# Patient Record
Sex: MALE | Race: WHITE | NOT HISPANIC OR LATINO | Employment: UNEMPLOYED | URBAN - METROPOLITAN AREA
[De-identification: names, ages, dates, MRNs, and addresses within clinical notes are randomized per-mention and may not be internally consistent; named-entity substitution may affect disease eponyms.]

---

## 2017-10-09 ENCOUNTER — ALLSCRIPTS OFFICE VISIT (OUTPATIENT)
Dept: OTHER | Facility: OTHER | Age: 62
End: 2017-10-09

## 2017-10-09 ENCOUNTER — GENERIC CONVERSION - ENCOUNTER (OUTPATIENT)
Dept: OTHER | Facility: OTHER | Age: 62
End: 2017-10-09

## 2017-10-09 DIAGNOSIS — R55 SYNCOPE AND COLLAPSE: ICD-10-CM

## 2017-10-09 LAB — HBA1C MFR BLD HPLC: NORMAL %

## 2017-10-12 ENCOUNTER — APPOINTMENT (OUTPATIENT)
Dept: RADIOLOGY | Facility: CLINIC | Age: 62
End: 2017-10-12
Payer: COMMERCIAL

## 2017-10-12 DIAGNOSIS — R55 SYNCOPE AND COLLAPSE: ICD-10-CM

## 2017-10-12 LAB
A/G RATIO (HISTORICAL): 2 (CALC) (ref 1–2.5)
ALBUMIN SERPL BCP-MCNC: 4.3 G/DL (ref 3.6–5.1)
ALP SERPL-CCNC: 85 U/L (ref 40–115)
ALT SERPL W P-5'-P-CCNC: 17 U/L (ref 9–46)
AST SERPL W P-5'-P-CCNC: 13 U/L (ref 10–35)
BILIRUB SERPL-MCNC: 1.1 MG/DL (ref 0.2–1.2)
BUN SERPL-MCNC: 11 MG/DL (ref 7–25)
BUN/CREA RATIO (HISTORICAL): ABNORMAL (CALC) (ref 6–22)
CALCIUM SERPL-MCNC: 9.2 MG/DL (ref 8.6–10.3)
CHLORIDE SERPL-SCNC: 97 MMOL/L (ref 98–110)
CHOLEST SERPL-MCNC: 256 MG/DL
CHOLEST/HDLC SERPL: 6.6 (CALC)
CO2 SERPL-SCNC: 28 MMOL/L (ref 20–31)
CREAT SERPL-MCNC: 0.92 MG/DL (ref 0.7–1.25)
EGFR AFRICAN AMERICAN (HISTORICAL): 104 ML/MIN/1.73M2
EGFR-AMERICAN CALC (HISTORICAL): 89 ML/MIN/1.73M2
GAMMA GLOBULIN (HISTORICAL): 2.2 G/DL (CALC) (ref 1.9–3.7)
GLUCOSE (HISTORICAL): 277 MG/DL (ref 65–99)
HDLC SERPL-MCNC: 39 MG/DL
LDL CHOLESTEROL (HISTORICAL): 167 MG/DL (CALC)
NON-HDL-CHOL (CHOL-HDL) (HISTORICAL): 217 MG/DL (CALC)
POTASSIUM SERPL-SCNC: 4 MMOL/L (ref 3.5–5.3)
SODIUM SERPL-SCNC: 134 MMOL/L (ref 135–146)
TOTAL PROTEIN (HISTORICAL): 6.5 G/DL (ref 6.1–8.1)
TRIGL SERPL-MCNC: 323 MG/DL

## 2017-10-12 PROCEDURE — 72040 X-RAY EXAM NECK SPINE 2-3 VW: CPT

## 2017-10-13 ENCOUNTER — GENERIC CONVERSION - ENCOUNTER (OUTPATIENT)
Dept: OTHER | Facility: OTHER | Age: 62
End: 2017-10-13

## 2017-10-13 ENCOUNTER — LAB CONVERSION - ENCOUNTER (OUTPATIENT)
Dept: OTHER | Facility: OTHER | Age: 62
End: 2017-10-13

## 2017-10-13 LAB
25(OH)D3 SERPL-MCNC: 22 NG/ML (ref 30–100)
BASOPHILS # BLD AUTO: 0.9 %
BASOPHILS # BLD AUTO: 50 CELLS/UL (ref 0–200)
DEPRECATED RDW RBC AUTO: 12.8 % (ref 11–15)
EOSINOPHIL # BLD AUTO: 1.4 %
EOSINOPHIL # BLD AUTO: 78 CELLS/UL (ref 15–500)
HCT VFR BLD AUTO: 43.1 % (ref 38.5–50)
HGB BLD-MCNC: 15 G/DL (ref 13.2–17.1)
LYMPHOCYTES # BLD AUTO: 1686 CELLS/UL (ref 850–3900)
LYMPHOCYTES # BLD AUTO: 30.1 %
MCH RBC QN AUTO: 31.3 PG (ref 27–33)
MCHC RBC AUTO-ENTMCNC: 34.8 G/DL (ref 32–36)
MCV RBC AUTO: 90 FL (ref 80–100)
MONOCYTES # BLD AUTO: 358 CELLS/UL (ref 200–950)
MONOCYTES (HISTORICAL): 6.4 %
NEUTROPHILS # BLD AUTO: 3427 CELLS/UL (ref 1500–7800)
NEUTROPHILS # BLD AUTO: 61.2 %
PLATELET # BLD AUTO: 192 THOUSAND/UL (ref 140–400)
PMV BLD AUTO: 10.1 FL (ref 7.5–12.5)
RBC # BLD AUTO: 4.79 MILLION/UL (ref 4.2–5.8)
WBC # BLD AUTO: 5.6 THOUSAND/UL (ref 3.8–10.8)

## 2017-10-16 ENCOUNTER — GENERIC CONVERSION - ENCOUNTER (OUTPATIENT)
Dept: OTHER | Facility: OTHER | Age: 62
End: 2017-10-16

## 2017-10-17 ENCOUNTER — GENERIC CONVERSION - ENCOUNTER (OUTPATIENT)
Dept: OTHER | Facility: OTHER | Age: 62
End: 2017-10-17

## 2017-10-27 ENCOUNTER — HOSPITAL ENCOUNTER (OUTPATIENT)
Dept: NON INVASIVE DIAGNOSTICS | Facility: CLINIC | Age: 62
Discharge: HOME | End: 2017-10-27
Payer: COMMERCIAL

## 2017-10-27 DIAGNOSIS — R55 SYNCOPE AND COLLAPSE: ICD-10-CM

## 2017-10-27 PROCEDURE — 93880 EXTRACRANIAL BILAT STUDY: CPT

## 2017-10-28 ENCOUNTER — GENERIC CONVERSION - ENCOUNTER (OUTPATIENT)
Dept: OTHER | Facility: OTHER | Age: 62
End: 2017-10-28

## 2017-11-06 ENCOUNTER — GENERIC CONVERSION - ENCOUNTER (OUTPATIENT)
Dept: OTHER | Facility: OTHER | Age: 62
End: 2017-11-06

## 2017-11-28 ENCOUNTER — GENERIC CONVERSION - ENCOUNTER (OUTPATIENT)
Dept: OTHER | Facility: OTHER | Age: 62
End: 2017-11-28

## 2017-12-08 ENCOUNTER — ALLSCRIPTS OFFICE VISIT (OUTPATIENT)
Dept: OTHER | Facility: OTHER | Age: 62
End: 2017-12-08

## 2018-01-01 DIAGNOSIS — E55.9 VITAMIN D DEFICIENCY: ICD-10-CM

## 2018-01-09 NOTE — MISCELLANEOUS
Reason For Visit  Reason For Visit Free Text Note Form: Received request to contact pt  regarding insurance related questions  Call to pt  and left message  Will continue to be available to provide support  Active Problems    1  Bilateral edema of lower extremity (782 3) (R60 0)   2  Candidal intertrigo (112 3) (B37 2)   3  Diabetes mellitus, type 2 (250 00) (E11 9)   4  Facial rash (782 1) (R21)   5  Fatigue (780 79) (R53 83)   6  Hyperlipidemia (272 4) (E78 5)   7  Microalbuminuria (791 0) (R80 9)   8  Noncompliance with treatment (V15 81) (Z91 19)   9  Numbness and tingling (782 0) (R20 0,R20 2)   10  Preventive measure (V07 9) (Z41 8)   11  Screen for colon cancer (V76 51) (Z12 11)   12  Screening, lipid (V77 91) (Z13 220)   13  Sinus congestion (478 19) (R09 81)   14  Thyroid nodule (241 0) (E04 1)   15  Urine frequency (788 41) (R35 0)   16  Vitamin D deficiency (268 9) (E55 9)    Current Meds   1  Aspirin Low Dose 81 MG Oral Tablet; Take 1 tablet daily; Therapy: 02OLH2338 to (Evaluate:49Vvd2523)  Requested for: 90ASH7581; Last   Rx:20Fud4188 Ordered   2  Atorvastatin Calcium 40 MG Oral Tablet; TAKE 1 TABLET DAILY AS DIRECTED; Therapy: 80VKA9622 to (Evaluate:69Ryd4434)  Requested for: 08Apr2016; Last   Rx:08Apr2016 Ordered   3  Januvia 50 MG Oral Tablet; TAKE 1 TABLET DAILY; Therapy: 39BZH2253 to (Evaluate:12Jun2016)  Requested for: 13Apr2016; Last   Rx:13Apr2016 Ordered   4  Lisinopril 2 5 MG Oral Tablet; TAKE 1 TABLET DAILY AS DIRECTED; Therapy: 07EZL2055 to (Evaluate:60Epo1828)  Requested for: 08Apr2016; Last   Rx:08Apr2016 Ordered   5  MetFORMIN HCl - 1000 MG Oral Tablet; TAKE 1 TABLET EVERY 12 HOURS DAILY; Therapy: 98BGQ0939 to (Evaluate:61Hgj8201)  Requested for: 18WWH0221; Last   Rx:09Ysk0807 Ordered   6  Saline Nasal Spray 0 65 % Nasal Solution; USE 2 SPRAYS IN EACH NOSTRIL TWICE   DAILY; Therapy: 08Apr2016 to (Last Rx:08Apr2016)  Requested for: 08Apr2016 Ordered   7   Vitamin D (Ergocalciferol) 56408 UNIT Oral Capsule; TAKE 1 CAPSULE WEEKLY; Therapy: 89BYH2822 to (Evaluate:48Bkd9168)  Requested for: 36FVH8619; Last   Rx:23Oct2015 Ordered    Allergies    1   Tylenol    Signatures   Electronically signed by : BRIAN Dang; May 17 2016 12:24PM EST                       (Author)

## 2018-01-12 VITALS
DIASTOLIC BLOOD PRESSURE: 72 MMHG | BODY MASS INDEX: 24.71 KG/M2 | SYSTOLIC BLOOD PRESSURE: 140 MMHG | WEIGHT: 163 LBS | TEMPERATURE: 98.1 F | HEART RATE: 88 BPM | RESPIRATION RATE: 14 BRPM | HEIGHT: 68 IN

## 2018-01-12 NOTE — PROGRESS NOTES
Assessment    1  Sinus congestion (178 74) (R04 81)    Plan  Sinus congestion    · Saline Nasal Spray 0 65 % Nasal Solution; USE 2 SPRAYS IN EACH NOSTRIL  TWICE DAILY    Discussion/Summary    1  sinus congestion  -likely viral in etiology, afebrile without exudates, clear lung exam  -prescribed nasal saline spray; supportive care  -discussed the potential harm in taking non-prescribed medications from family members; also discussed that when a patient, ie nephew, is given an antibiotic he/she should complete the final course even if symptoms improve  -RTC precautions given: new fevers, symptoms not improving over the next week  The patient was counseled regarding instructions for management, risk factor reductions, prognosis, patient and family education, impressions, risks and benefits of treatment options, importance of compliance with treatment  Chief Complaint  URI/sinus symptoms      History of Present Illness  HPI: Patient is a 63 yo M presenting with sinus congestion, rhinorrhea, cough, and sneezing  Symptoms started about a week ago; his sinus congestion was diffuse at first but is now more concentrated on his left side  He thought he saw a white spot on the back of throat 2 days prior  No fevers, but headache  Relieved by aspirin  Sick contacts include 26yo nephew, who had an ear infection  His nephew had left over Amoxicillin which the patient took for about 2-3 days  Patient also complaining of nosebleeds from excessive sneezing and nose-blowing  Of note he is currently living in a basement with his family, which has a lot of dust       Review of Systems    Constitutional: no fever and no chills  ENT: nosebleeds, sore throat and nasal discharge, but no earache and no hearing loss  Cardiovascular: no chest pain and no palpitations  Respiratory: cough, but no shortness of breath and no wheezing  Gastrointestinal: no nausea and no diarrhea  Genitourinary: no dysuria     Integumentary: no rashes and no skin lesions  Neurological: headache, but no dizziness  ROS reviewed  Active Problems    1  Bilateral edema of lower extremity (782 3) (R60 0)   2  Candidal intertrigo (112 3) (B37 2)   3  Diabetes mellitus, type 2 (250 00) (E11 9)   4  Facial rash (782 1) (R21)   5  Fatigue (780 79) (R53 83)   6  Hyperlipidemia (272 4) (E78 5)   7  Microalbuminuria (791 0) (R80 9)   8  Noncompliance with treatment (V15 81) (Z91 19)   9  Numbness and tingling (782 0) (R20 0,R20 2)   10  Preventive measure (V07 9) (Z41 8)   11  Screen for colon cancer (V76 51) (Z12 11)   12  Screening, lipid (V77 91) (Z13 220)   13  Thyroid nodule (241 0) (E04 1)   14  Urine frequency (788 41) (R35 0)   15  Vitamin D deficiency (268 9) (E55 9)    Past Medical History  Active Problems And Past Medical History Reviewed: The active problems and past medical history were reviewed and updated today  Family History    1  Family history of cerebrovascular accident (CVA) (V17 1) (Z82 3)  Family History Reviewed: The family history was reviewed and updated today  Social History    · Non-smoker (V49 89) (Z78 9)  The social history was reviewed and updated today  Surgical History  Surgical History Reviewed: The surgical history was reviewed and updated today  Current Meds   1  Aspirin 81 MG Oral Tablet; TAKE 1 TABLET DAILY; Therapy: 51RQC9572 to (Evaluate:55Wmj2480)  Requested for: 36ZMK0709; Last   Rx:16Oct2015 Ordered   2  Atorvastatin Calcium 40 MG Oral Tablet; TAKE 1 TABLET DAILY AS DIRECTED; Therapy: 82VMO8837 to (Evaluate:75Kxp7916)  Requested for: 80Oiu7248; Last   Rx:12Bvb0227 Ordered   3  Januvia 50 MG Oral Tablet; TAKE 1 TABLET DAILY; Therapy: 38WSI2860 to (Evaluate:87Qws0752)  Requested for: 38ZVF5824; Last   Rx:12Kcs2782 Ordered   4  Lisinopril 2 5 MG Oral Tablet; TAKE 1 TABLET DAILY AS DIRECTED;    Therapy: 06FZD5806 to (Evaluate:56Lgb5626)  Requested for: 08Apr2016; Last   Rx:08Apr2016 Ordered   5  MetFORMIN HCl - 1000 MG Oral Tablet; TAKE 1 TABLET EVERY 12 HOURS DAILY; Therapy: 50NJN2149 to (Evaluate:21Jan2016)  Requested for: 82VHM5465; Last   Rx:23Oct2015 Ordered   6  Vitamin D (Ergocalciferol) 20281 UNIT Oral Capsule; TAKE 1 CAPSULE WEEKLY; Therapy: 65GKG6115 to (Evaluate:34Vax5372)  Requested for: 96HWA0192; Last   Rx:23Oct2015 Ordered    The medication list was reviewed and updated today  Allergies    1  Tylenol    Vitals   Recorded: 08Apr2016 03:56PM   Temperature 98 4 F   Heart Rate 92   Respiration 18   Systolic 692   Diastolic 80   Height 5 ft 8 in   Weight 166 lb 2 08 oz   BMI Calculated 25 26   BSA Calculated 1 89   Pain Scale 6     Physical Exam    Constitutional   General appearance: No acute distress, well appearing and well nourished  Eyes   Conjunctiva and lids: No swelling, erythema, or discharge  Ears, Nose, Mouth, and Throat   External inspection of ears and nose: Normal     Otoscopic examination: Tympanic membrance translucent with normal light reflex  Canals patent without erythema  Oropharynx: Abnormal   post-nasal erythema, no exudates  Pulmonary   Respiratory effort: No increased work of breathing or signs of respiratory distress  Auscultation of lungs: Clear to auscultation, equal breath sounds bilaterally, no wheezes, no rales, no rhonci  Cardiovascular   Auscultation of heart: Normal rate and rhythm, normal S1 and S2, without murmurs  Abdomen   Abdomen: Non-tender, no masses  Lymphatic   Palpation of lymph nodes in neck: No lymphadenopathy  Skin   Skin and subcutaneous tissue: Normal without rashes or lesions  Psychiatric   Orientation to person, place and time: Normal     Mood and affect: Normal          Attending Note  Attending Note [facility]: Attending Note: I discussed the case with the Resident and reviewed the Resident's note, I supervised the Resident and I agree with the Resident management plan as it was presented to me  Level of Participation: I was present in clinic, but did not examine the patient  I agree with the Resident's note  Future Appointments    Date/Time Provider Specialty Site   05/09/2016 05:00 PM Ada Lemon [address]     Signatures   Electronically signed by : Guillermina Prakash DO;  Apr 9 2016 10:15AM EST                       (Author)    Electronically signed by : QUYNH Aguilar ; Apr 15 2016 10:04AM EST                       (Author)

## 2018-01-13 NOTE — RESULT NOTES
Verified Results  (1) CBC/PLT/DIFF 12Oct2017 09:42AM Tierra Ramirez   REPORT COMMENT:  FASTING:YES     Test Name Result Flag Reference   WHITE BLOOD CELL COUNT 5 6 Thousand/uL  3 8-10 8   RED BLOOD CELL COUNT 4 79 Million/uL  4 20-5 80   HEMOGLOBIN 15 0 g/dL  13 2-17 1   HEMATOCRIT 43 1 %  38 5-50 0   MCV 90 0 fL  80 0-100 0   MCH 31 3 pg  27 0-33 0   MCHC 34 8 g/dL  32 0-36 0   RDW 12 8 %  11 0-15 0   PLATELET COUNT 102 Thousand/uL  140-400   ABSOLUTE NEUTROPHILS 3427 cells/uL  2513-4900   ABSOLUTE LYMPHOCYTES 1686 cells/uL  850-3900   ABSOLUTE MONOCYTES 358 cells/uL  200-950   ABSOLUTE EOSINOPHILS 78 cells/uL     ABSOLUTE BASOPHILS 50 cells/uL  0-200   NEUTROPHILS 61 2 %     LYMPHOCYTES 30 1 %     MONOCYTES 6 4 %     EOSINOPHILS 1 4 %     BASOPHILS 0 9 %     MPV 10 1 fL  7 5-12 5     (1) COMPREHENSIVE METABOLIC PANEL 50JMF9872 25:33EB Tierra Ramirez   REPORT COMMENT:  FASTING:YES     Test Name Result Flag Reference   GLUCOSE 277 mg/dL H 65-99   Fasting reference interval     For someone without known diabetes, a glucose  value >125 mg/dL indicates that they may have  diabetes and this should be confirmed with a  follow-up test    UREA NITROGEN (BUN) 11 mg/dL  7-25   CREATININE 0 92 mg/dL  0 70-1 25   For patients >52years of age, the reference limit  for Creatinine is approximately 13% higher for people  identified as -American  eGFR NON-AFR   AMERICAN 89 mL/min/1 73m2  > OR = 60   eGFR AFRICAN AMERICAN 104 mL/min/1 73m2  > OR = 60   BUN/CREATININE RATIO   4-84   NOT APPLICABLE (calc)   SODIUM 134 mmol/L L 135-146   POTASSIUM 4 0 mmol/L  3 5-5 3   CHLORIDE 97 mmol/L L    CARBON DIOXIDE 28 mmol/L  20-31   CALCIUM 9 2 mg/dL  8 6-10 3   PROTEIN, TOTAL 6 5 g/dL  6 1-8 1   ALBUMIN 4 3 g/dL  3 6-5 1   GLOBULIN 2 2 g/dL (calc)  1 9-3 7   ALBUMIN/GLOBULIN RATIO 2 0 (calc)  1 0-2 5   BILIRUBIN, TOTAL 1 1 mg/dL  0 2-1 2   ALKALINE PHOSPHATASE 85 U/L     AST 13 U/L  10-35   ALT 17 U/L  9-46 (1) LIPID PANEL, FASTING 12Oct2017 09:42AM Tierra Ramirez     Test Name Result Flag Reference   CHOLESTEROL, TOTAL 256 mg/dL H <200   HDL CHOLESTEROL 39 mg/dL L >62   TRIGLICERIDES 201 mg/dL H <150   LDL-CHOLESTEROL 167 mg/dL (calc) H    Reference range: <100     Desirable range <100 mg/dL for patients with CHD or  diabetes and <70 mg/dL for diabetic patients with  known heart disease  LDL-C is now calculated using the Micheal-Harmon   calculation, which is a validated novel method providing   better accuracy than the Friedewald equation in the   estimation of LDL-C  Ria Rubi al  [facility]  9688;738(59): 3605-4003   (http://education  [employer]  com/faq/ACW339)   CHOL/HDLC RATIO 6 6 (calc) H <5 0   NON HDL CHOLESTEROL 217 mg/dL (calc) H <130   For patients with diabetes plus 1 major ASCVD risk   factor, treating to a non-HDL-C goal of <100 mg/dL   (LDL-C of <70 mg/dL) is considered a therapeutic   option

## 2018-01-14 NOTE — RESULT NOTES
Verified Results  Hemoglobin A1c- POC 39BUE4501 04:19PM Nasim Willett     Test Name Result Flag Reference   HEMOGLOBIN A1C 10 7%

## 2018-01-15 NOTE — PROGRESS NOTES
Assessment    1  Diabetes mellitus, type 2 (250 00) (E11 9)   2  Facial rash (782 1) (R21)   3  Noncompliance with treatment (V15 81) (Z91 19)   4  Hyperlipidemia (272 4) (E78 5)   5  Microalbuminuria (791 0) (R80 9)    Plan  Diabetes mellitus, type 2    · (1) BASIC METABOLIC PROFILE; Status:Active; Requested for:11Hmc3900;    · (1) HEMOGLOBIN A1C; Status:Active; Requested for:37Dou7968;   Facial rash    · (1) HUBER SCREEN, (INC  PATTERN IF INDICATED); Status:Active; Requested  for:83Svb7651;     Discussion/Summary    63 yo M   1  DM - pt is noncompliant  Not taking his metformin and januvia regularly  Had extensive conversation about medical compliance  Also discussed proper diabetic diet  Will repeat A1c and BMP now  Pt is to schedule ophthalmology apt  Foot exam was up to date on last visit  Pt refuses pneumonia vaccination  C/w ASA 81 daily  F/u 3 months  2  Facial Rash - unclear etiology at this time  Persistent for past 3 months  Will check HUBER to r/o SLE  Otherwise will monitor as pt refuses any creams or meds to be taken  3  Dyslipidemia - previous ASCVD risk score 17%  Pt is not regularly taking his statin as prescribed  Discussed on proper low cholesterol diet and medicine compliance with his statin  4  Pt is noncompliant with most all treatment plans  Takes all his meds very irregularly, often skipping doses  Talked extensively on the importance of medical compliance  Pt is to obtain colonoscopy and thyroid u/s as previously ordered  Reprinted lab slips  5  F/u 3 months  Possible side effects of new medications were reviewed with the patient/guardian today  The treatment plan was reviewed with the patient/guardian   The patient/guardian understands and agrees with the treatment plan   The patient was counseled regarding diagnostic results, instructions for management, risk factor reductions, prognosis, patient and family education, impressions, risks and benefits of treatment options, importance of compliance with treatment  Chief Complaint  3 month f/u DM      History of Present Illness  63 yo M presents to [facility] as a 3 month f/u  Pt has DM and was prescribed januvia 50 daily and metformin 1000 twice daily  However, pt is very noncompliant  He is only taking metformin 500 once a day, and taking junuvia very irregularly  He feels that meds are "not the way to go " However, he has adjusted his diet, and eating healthier  He has cut down on his quantity and cut down half of the junk food  Pt is not really exercising  Pt is also noncompliant on is lisinopril and statin medication - taking it very irregularly  Pt did not go for his ophthalmology apt  Did not go for colonoscopy  Did not go for thyroid u/s  Pt denies fever, chills, cp, sob, palpitations, n/v/d/c, numbness, tingling  Denies abdominal pain  Denies urinary problems  Review of Systems    Constitutional: no fever, not feeling poorly, no chills and not feeling tired  Eyes: no eye pain, no eyesight problems and no purulent discharge from the eyes  ENT: no earache, no nosebleeds and no nasal discharge  Cardiovascular: the heart rate was not slow, no chest pain, no intermittent leg claudication, the heart rate was not fast and no palpitations  Respiratory: no shortness of breath, no cough, no wheezing and no shortness of breath during exertion  Gastrointestinal: no abdominal pain, no nausea, no vomiting, no constipation, no diarrhea and no blood in stools  Genitourinary: no dysuria, no urinary hesitancy, no genital lesions, no incontinence, no nocturia and no testicular pain  Musculoskeletal: no arthralgias, no joint swelling, no myalgias and no joint stiffness  Integumentary: no rashes and no skin lesions  Neurological: no headache, no numbness, no tingling, no dizziness, no limb weakness and no difficulty walking  Psychiatric: no anxiety  Endocrine: no muscle weakness  Hematologic/Lymphatic: no swollen glands  Active Problems    1  Bilateral edema of lower extremity (782 3) (R60 0)   2  Candidal intertrigo (112 3) (B37 2)   3  Diabetes mellitus, type 2 (250 00) (E11 9)   4  Fatigue (780 79) (R53 83)   5  Hyperlipidemia (272 4) (E78 5)   6  Microalbuminuria (791 0) (R80 9)   7  Numbness and tingling (782 0) (R20 2)   8  Preventive measure (V07 9) (Z41 8)   9  Screen for colon cancer (V76 51) (Z12 11)   10  Screening, lipid (V77 91) (Z13 220)   11  Thyroid nodule (241 0) (E04 1)   12  Urine frequency (788 41) (R35 0)   13  Vitamin D deficiency (268 9) (E55 9)    Past Medical History    The active problems and past medical history were reviewed and updated today  Surgical History    The surgical history was reviewed and updated today  Family History    1  Family history of cerebrovascular accident (CVA) (V17 1) (Z82 3)    The family history was reviewed and updated today  Social History    · Non-smoker (V49 89) (Z78 9)  The social history was reviewed and updated today  Current Meds   1  Aspirin 81 MG Oral Tablet; TAKE 1 TABLET DAILY; Therapy: 08KWK5215 to (Evaluate:31Xan5928)  Requested for: 18MAY1885; Last   Rx:16Oct2015 Ordered   2  Atorvastatin Calcium 40 MG Oral Tablet; TAKE 1 TABLET DAILY AS DIRECTED; Therapy: 97NYA8964 to (Evaluate:21Jan2016)  Requested for: 89GJY1719; Last   Rx:23Oct2015 Ordered   3  Januvia 50 MG Oral Tablet; TAKE 1 TABLET DAILY; Therapy: 55FWN6560 to (Evaluate:97Ehf9764)  Requested for: 46BFQ3536; Last   Rx:23Oct2015 Ordered   4  Lisinopril 2 5 MG Oral Tablet; TAKE 1 TABLET DAILY AS DIRECTED; Therapy: 71HFS7435 to (Evaluate:21Jan2016)  Requested for: 29EQG3008; Last   Rx:23Oct2015 Ordered   5  MetFORMIN HCl - 1000 MG Oral Tablet; TAKE 1 TABLET EVERY 12 HOURS DAILY; Therapy: 59QJC0176 to (Evaluate:21Jan2016)  Requested for: 06JDW2607; Last   Rx:23Oct2015 Ordered   6  Vitamin D (Ergocalciferol) 41875 UNIT Oral Capsule; TAKE 1 CAPSULE WEEKLY;    Therapy: 30GHA9817 to (Evaluate:12Zcp5227)  Requested for: 23Oct2015; Last   Rx:23Oct2015 Ordered    The medication list was reviewed and updated today  Allergies    1  Tylenol    Vitals  Vital Signs [Data Includes: Current Encounter]    Recorded: 04ABA6173 05:21PM   Temperature 97 2 F   Heart Rate 84   Respiration 14   Systolic 581   Diastolic 72   Height 5 ft 8 in   Weight 164 lb 2 08 oz   BMI Calculated 24 96   BSA Calculated 1 89   Pain Scale 0     Physical Exam    Constitutional   General appearance: No acute distress, well appearing and well nourished  Eyes   Conjunctiva and lids: No swelling, erythema, or discharge  Ears, Nose, Mouth, and Throat   External inspection of ears and nose: Normal     Nasal mucosa, septum, and turbinates: Normal without edema or erythema  Oropharynx: Normal with no erythema, edema, exudate or lesions  Pulmonary   Auscultation of lungs: Clear to auscultation, equal breath sounds bilaterally, no wheezes, no rales, no rhonci  Cardiovascular   Auscultation of heart: Normal rate and rhythm, normal S1 and S2, without murmurs  Examination of extremities for edema and/or varicosities: Normal     Carotid pulses: Normal     Abdomen   Abdomen: Non-tender, no masses  Liver and spleen: No hepatomegaly or splenomegaly  Lymphatic   Palpation of lymph nodes in neck: No lymphadenopathy  Musculoskeletal   Gait and station: Normal     Inspection/palpation of joints, bones, and muscles: Normal     Skin   Skin and subcutaneous tissue: Abnormal   Positive facial rash over forehead, b/l cheeks and over nasal bridge  Non tender, non erythematous, mildy blanchable  Neurologic   Cranial nerves: Cranial nerves 2-12 intact  Sensation: No sensory loss      Psychiatric   Orientation to person, place and time: Normal          Attending Note  Attending Note: I discussed the case with the Resident and reviewed the Resident's note, I supervised the Resident and I agree with the Resident management plan as it was presented to me  Level of Participation: I was present in clinic, but did not examine the patient  Diagnosis and Plan: chronic issues - as documented  I agree with the Resident's note        Future Appointments    Date/Time Provider Specialty Site   05/09/2016 05:00 PM Edgar [facility] [address] [facility]     Signatures   Electronically signed by : Lorelei Hewitt, ; Feb 9 2016  8:33AM EST                       (Author)    Electronically signed by : Willene Snellen, DO; Feb 12 2016  4:10PM EST                       (Author)

## 2018-01-16 NOTE — RESULT NOTES
Verified Results  * XR SPINE CERVICAL 2 OR 3 VW INJURY 12Oct2017 10:54AM Micki Arreola   TW Order Number: [id number]     Test Name Result Flag Reference   XR SPINE CERVICAL 2 OR 3 VW (Report)     CERVICAL SPINE     INDICATION: Patient has syncope if he turns his head quickly     COMPARISON: None     VIEWS: AP, lateral and open mouth projections     IMAGES: 3     FINDINGS:     There is minimal grade 1 retrolisthesis C5 on C6  There is narrowing of the C5-C6 disc with prominent bone spur at the anterior vertebral margin at this level  There is probably minimal C6-C7 disc narrowing  The remaining discs appear normal       The prevertebral soft tissues are within normal limits  The lung apices are intact         IMPRESSION:     Chronic degenerative changes C5-C6 disc       Workstation performed: [id number]     Signed by:   Becka Andrade MD   10/16/17

## 2018-01-17 NOTE — RESULT NOTES
Verified Results  VAS CAROTID COMPLETE STUDY 90FEB0604 10:05AM Farhan Rubin Order Number: [id number]    - Patient Instructions: To schedule this appointment, please contact Central Scheduling at [phone]  Test Name Result Flag Reference   VAS CAROTID COMPLETE STUDY (Report)     [facility] REPORT   CLINICAL:   Indications: Syncope [R55]  Patient presents with a two year history of near syncopal episodes  Risk Factors   The patient has history of Diabetes and hyperlipidemia  Clinical   Right Brachial Pressure: 126/76 mm Hg, Left Brachial Pressure: 126/76 mm Hg  FINDINGS:      Right    Impression PSV EDV (cm/s) Direction of Flow Ratio    Dist  ICA         84     21           0 66    Mid  ICA         118     39           0 92    Prox  ICA  1 - 49%   90     29           0 71    Dist CCA         118     25                 Mid CCA         127     21           0 95    Prox CCA         134     20                 Ext Carotid       146     11           1 14    Prox Vert         73     17 Antegrade            Subclavian        208     10                    Left     Impression PSV EDV (cm/s) Direction of Flow Ratio    Dist  ICA         73     32           0 47    Mid  ICA         100     37           0 64    Prox  ICA  1 - 49%   125     25           0 81    Dist CCA         127     29                 Mid CCA         155     35           1 16    Prox CCA         133     27                 Ext Carotid       153     13           0 99    Prox Vert         73     16 Antegrade            Subclavian        202     10                          CONCLUSION:   Impression   RIGHT:   There is <50% stenosis noted in the internal carotid artery  Plaque is   homogenous and smooth  Vertebral artery flow is antegrade  There is no significant subclavian artery   disease  LEFT:   There is <50% stenosis noted in the internal carotid artery  Plaque is   heterogenous and irregular     Vertebral artery flow is antegrade  There is no significant subclavian artery   disease  Internal carotid artery stenosis determination by consensus criteria from:   Chris Young et al  Carotid Artery Stenosis: Gray-Scale and Doppler US Diagnosis   - Society of Radiologists in [address] [place] [address], Radiology 2003;   [phone]        SIGNATURE:   Electronically Signed by: Dario Black MD on 2017-10-27 09:30:56 PM

## 2018-01-22 VITALS
WEIGHT: 164.38 LBS | TEMPERATURE: 97.5 F | RESPIRATION RATE: 16 BRPM | SYSTOLIC BLOOD PRESSURE: 124 MMHG | HEART RATE: 76 BPM | HEIGHT: 67 IN | BODY MASS INDEX: 25.8 KG/M2 | DIASTOLIC BLOOD PRESSURE: 78 MMHG

## 2018-01-22 VITALS
TEMPERATURE: 98.3 F | HEIGHT: 68 IN | WEIGHT: 164.25 LBS | BODY MASS INDEX: 24.89 KG/M2 | HEART RATE: 72 BPM | RESPIRATION RATE: 16 BRPM | DIASTOLIC BLOOD PRESSURE: 82 MMHG | SYSTOLIC BLOOD PRESSURE: 140 MMHG

## 2018-01-23 NOTE — CONSULTS
Chief Complaint  Pre-op Clearance      History of Present Illness  Pre-Op Visit (Brief): The patient is being seen for a preoperative visit  The procedure is a(n) Eyelid lift scheduled for 12/22/17 with Dr Addi Ellington  The indication for surgery is Ptosis  Surgical Risk Assessment:   Prior Anesthesia: He had prior anesthesia and no prior adverse reaction to general anesthesia  Pertinent Past Medical History: diabetes, but no CAD, no CHF, no CVA, no asthma, no COPD, not EMANUEL and no renal disease  Exercise Capacity: able to walk four blocks without symptoms and able to walk two flights of stairs without symptoms  Lifestyle Factors: denies tobacco use, denies illegal drug use and Rare alcohol use  Symptoms: no chest pain, no cough, no edema, no palpitations, no wheezing and Pt previously had episodes of pre-syncope when turning his head quickly  Pt had C-spine XR and carotid dopplers that were both negative for concerning abnormality  Pt denies any recurrent episodes of this in over a month  Review of Systems    Cardiovascular: no chest pain, no palpitations and no extremity edema  Respiratory: no shortness of breath and no cough  Neurological: no headache, no numbness, no tingling, no dizziness and no fainting  Active Problems    1  Diabetes mellitus, type 2 (250 00) (E11 9)   2  Hyperlipidemia (272 4) (E78 5)   3  Microalbuminuria (791 0) (R80 9)   4  Noncompliance with treatment (V15 81) (Z91 19)   5  Ptosis of both eyelids (374 30) (H02 403)   6  Thyroid nodule (241 0) (E04 1)   7   Vitamin D deficiency (268 9) (E55 9)    Past Medical History    · History of Bilateral edema of lower extremity (782 3) (R60 0)   · History of Candidal intertrigo (112 3) (B37 2)   · History of Facial rash (782 1) (R21)   · History of fatigue (V13 89) (Z46 796)   · History of headache (V13 89) (B41 165)   · History of paranasal sinus congestion (V12 69) (Z87 09)   · History of urinary frequency (V13 09) (H58 004)   · History of Near syncope (780 2) (R55)   · History of Numbness and tingling (782 0) (R20 0,R20 2)   · History of Preventive measure (V07 9) (Z29 9)   · History of Screen for colon cancer (V76 51) (Z12 11)   · History of Screening, lipid (V77 91) (Z13 220)    The active problems and past medical history were reviewed and updated today  Surgical History    The surgical history was reviewed and updated today  Family History    · Family history of cerebrovascular accident (CVA) (V17 1) (Z82 3)    The family history was reviewed and updated today  Social History    · Non-smoker (V49 89) (Z78 9)  The social history was reviewed and is unchanged  Current Meds   1  Atorvastatin Calcium 40 MG Oral Tablet; TAKE 1 TABLET DAILY; Therapy: 95PWO7694 to (Jacob Joseph)  Requested for: 97NET4123; Last   Rx:28Nov2017 Ordered   2  MetFORMIN HCl - 500 MG Oral Tablet; Take 1 tablet once a day for 3-4 days, then 1 tablet   twice daily for 3-4 days, then 2 tablets twice a day; Therapy: 61EGL0944 to ([id number])  Requested for: 55DLU5912; Last   Rx:09Oct2017 Ordered   3  Tylenol Extra Strength 500 MG Oral Tablet; TAKE 1 TABLET Every 6 hours; Therapy: 72BMG6299 to (Evaluate:27Jan2018)  Requested for: 73PCH7054; Last   Rx:28Nov2017 Ordered   4  Vitamin D (Ergocalciferol) 23819 UNIT Oral Capsule; TAKE 1 CAPSULE WEEKLY; Therapy: 89CFG1636 to (Evaluate:41Xgi0649)  Requested for: 86MCM0163; Last   Rx:13Oct2017 Ordered    The medication list was reviewed and updated today  Allergies    1  Tylenol    Vitals  Signs    Temperature: 97 5 F  Heart Rate: 76  Respiration: 16  Systolic: 137  Diastolic: 78  Height: 5 ft 6 6 in  Weight: 164 lb 6 oz  BMI Calculated: 26 06  BSA Calculated: 1 85    Physical Exam    Constitutional   General appearance: No acute distress, well appearing and well nourished  Head and Face   Head and face: Normal     Eyes   Conjunctiva and lids: No erythema, swelling or discharge  Ears, Nose, Mouth, and Throat   Otoscopic examination: Tympanic membranes translucent with normal light reflex  Canals patent without erythema  Nasal mucosa, septum, and turbinates: Normal without edema or erythema  Oropharynx: Normal with no erythema, edema, exudate or lesions  Neck   Neck: Supple, symmetric, trachea midline, no masses  Thyroid: Normal, no thyromegaly  Pulmonary   Respiratory effort: No increased work of breathing or signs of respiratory distress  Auscultation of lungs: Clear to auscultation  Cardiovascular   Auscultation of heart: Normal rate and rhythm, normal S1 and S2, no murmurs  Examination of extremities for edema and/or varicosities: Normal     Abdomen   Abdomen: Non-tender, no masses  Lymphatic   Palpation of lymph nodes in neck: No lymphadenopathy  Musculoskeletal   Gait and station: Normal     Skin   Skin and subcutaneous tissue: Normal without rashes or lesions  Neurologic Grossly intact  Psychiatric   Orientation to person, place and time: Normal     Mood and affect: Normal        Assessment    1  Preop examination (V72 84) (Z01 818)   2  Ptosis of both eyelids (374 30) (H02 403)    Discussion/Summary  Surgical Clearance: He is at a LOW risk from a cardiovascular standpoint at this time without any additional cardiac testing  Reevaluation needed, if he should present with symptoms prior to surgery/procedure  65year old man who presents for pre-op clearance  (+) DM, but not on insulin  (-) CAD, CHF, CVD, Cr>2    0 4% risk of adverse cardiac event  Pt is low risk for a low risk procedure  The patient was counseled regarding instructions for management  The treatment plan was reviewed with the patient/guardian  The patient/guardian understands and agrees with the treatment plan      End of Encounter Meds    1  MetFORMIN HCl - 500 MG Oral Tablet;  Take 1 tablet once a day for 3-4 days, then 1 tablet   twice daily for 3-4 days, then 2 tablets twice a day; Therapy: 59QTD4687 to ([phone])  Requested for: 64DCG6999; Last   Rx:09Oct2017 Ordered    2  Atorvastatin Calcium 40 MG Oral Tablet; TAKE 1 TABLET DAILY; Therapy: 57JOP8919 to (Gradie Simmonds)  Requested for: 76MGS4905; Last   Rx:28Nov2017 Ordered    3  Tylenol Extra Strength 500 MG Oral Tablet; TAKE 1 TABLET Every 6 hours; Therapy: 94HQS8683 to (Evaluate:27Jan2018)  Requested for: 09NSP7580; Last   Rx:28Nov2017 Ordered    4  Vitamin D (Ergocalciferol) 74248 UNIT Oral Capsule; TAKE 1 CAPSULE WEEKLY;    Therapy: 47TLW5885 to (Evaluate:06Krz4634)  Requested for: 42BLG5706; Last   Rx:13Oct2017 Ordered    Signatures   Electronically signed by : Jarret Blanton DO; Dec 11 2017 10:45AM EST                       (Author)    Electronically signed by : QUYNH Painting ; Dec 14 2017  5:41PM EST                       (Review)

## 2018-06-13 DIAGNOSIS — E78.5 HYPERLIPIDEMIA, UNSPECIFIED HYPERLIPIDEMIA TYPE: Primary | ICD-10-CM

## 2018-06-13 RX ORDER — ATORVASTATIN CALCIUM 40 MG/1
TABLET, FILM COATED ORAL
Qty: 90 TABLET | Refills: 1 | Status: SHIPPED | OUTPATIENT
Start: 2018-06-13 | End: 2019-02-14 | Stop reason: SDUPTHER

## 2019-01-07 DIAGNOSIS — Z79.4 TYPE 2 DIABETES MELLITUS WITHOUT COMPLICATION, WITH LONG-TERM CURRENT USE OF INSULIN (HCC): Primary | ICD-10-CM

## 2019-01-07 DIAGNOSIS — E11.9 TYPE 2 DIABETES MELLITUS WITHOUT COMPLICATION, WITH LONG-TERM CURRENT USE OF INSULIN (HCC): Primary | ICD-10-CM

## 2019-01-07 NOTE — TELEPHONE ENCOUNTER
Called and spoke with patient I was able to get him 01/17 with Dr Cruz  Are we able to refill til then ? Thank you in advance

## 2019-01-07 NOTE — TELEPHONE ENCOUNTER
Patient is need of visit and is requesting metformin  If you can refill and I will send message to clerical to schedule message as soon as possible

## 2019-01-08 NOTE — TELEPHONE ENCOUNTER
One month supply sent   Pt needs to be schedule prior to any further refills as he has not been seen in >1 year

## 2019-01-17 ENCOUNTER — OFFICE VISIT (OUTPATIENT)
Dept: FAMILY MEDICINE CLINIC | Facility: CLINIC | Age: 64
End: 2019-01-17

## 2019-01-17 VITALS
WEIGHT: 158.2 LBS | SYSTOLIC BLOOD PRESSURE: 128 MMHG | DIASTOLIC BLOOD PRESSURE: 80 MMHG | BODY MASS INDEX: 25.43 KG/M2 | HEIGHT: 66 IN | RESPIRATION RATE: 14 BRPM | TEMPERATURE: 97.2 F | HEART RATE: 88 BPM

## 2019-01-17 DIAGNOSIS — E11.9 TYPE 2 DIABETES MELLITUS WITHOUT COMPLICATION, WITHOUT LONG-TERM CURRENT USE OF INSULIN (HCC): Primary | ICD-10-CM

## 2019-01-17 DIAGNOSIS — Z11.59 NEED FOR HEPATITIS C SCREENING TEST: ICD-10-CM

## 2019-01-17 LAB — SL AMB POCT HEMOGLOBIN AIC: 8.7 (ref ?–6.5)

## 2019-01-17 PROCEDURE — 83036 HEMOGLOBIN GLYCOSYLATED A1C: CPT | Performed by: FAMILY MEDICINE

## 2019-01-17 PROCEDURE — 99213 OFFICE O/P EST LOW 20 MIN: CPT | Performed by: FAMILY MEDICINE

## 2019-01-17 RX ORDER — GLIPIZIDE 5 MG/1
5 TABLET, FILM COATED, EXTENDED RELEASE ORAL DAILY
Qty: 30 TABLET | Refills: 5 | Status: SHIPPED | OUTPATIENT
Start: 2019-01-17 | End: 2019-05-12 | Stop reason: SDUPTHER

## 2019-01-17 NOTE — PROGRESS NOTES
Jack Ames 1955 male MRN: [id number]    Family Medicine Follow-up Visit    ASSESSMENT/PLAN  Problem List Items Addressed This Visit     Diabetes mellitus, type 2 ([address] ) - Primary    Relevant Medications    metFORMIN (GLUCOPHAGE) 1000 MG tablet    glipiZIDE (GLUCOTROL XL) 5 mg 24 hr tablet    Other Relevant Orders    Lipid panel    Comprehensive metabolic panel    Microalbumin / creatinine urine ratio      Other Visit Diagnoses     Need for hepatitis C screening test        Relevant Orders    Hepatitis C antibody        DM: In office A1c 8 7%; Above goal --> will start Glipizide 5 mg daily; discussed possible ADRs, including hypoglycemia; encouraged pt to call if he develops any symptoms of low sugars (palpitations, dizzy/lightheaded, nausea, diaphoresis)   - Foot exam: Pt deferred  - Eye exam: Pt could not remember physician's name; encouraged pt to call office with name so we can request records  - Script given for microalbumin/cr ratio     HLD: Lipid panel + CMP script given     HM:   - Pt deferred Flu and Pneumovax   - Script given for Hep C screening     Continue current medication regimen and follow up with specialists as scheduled  Encouraged to follow up in 3 months  No future appointments  SUBJECTIVE  CC: Follow-up and Medication Refill      HPI:  Jack Ames is a 59 y o  male who presents for follow up of chronic conditions as detailed below  DM: Due for A1c, microalbumin/cr, foot and eye exams (last in October); has been taking 1957-0331 mg of Metformin daily as he ran out of medication   HLD: Due for lipid panel; taking statin without issue     Review of Systems   Eyes: Negative for visual disturbance  Respiratory: Negative for shortness of breath  Cardiovascular: Negative for chest pain, palpitations and leg swelling  Neurological: Negative for dizziness and headaches         Historical Information   The patient history was reviewed and updated as follows:    Past Medical History:   Diagnosis Date    Candidal intertrigo 10/16/2015    Diabetes mellitus ([facility] [address] )      History reviewed  No pertinent surgical history  History reviewed  No pertinent family history  Social History   History   Alcohol Use    0 6 oz/week    1 Cans of beer per week     History   Drug Use No     History   Smoking Status    Never Smoker   Smokeless Tobacco    Never Used       Medications:     Current Outpatient Prescriptions:     atorvastatin (LIPITOR) 40 mg tablet, TAKE 1 TABLET EVERY DAY, Disp: 90 tablet, Rfl: 1    metFORMIN (GLUCOPHAGE) 1000 MG tablet, Take 1 tablet (1,000 mg total) by mouth every 12 (twelve) hours, Disp: 60 tablet, Rfl: 5    glipiZIDE (GLUCOTROL XL) 5 mg 24 hr tablet, Take 1 tablet (5 mg total) by mouth daily, Disp: 30 tablet, Rfl: 5  Allergies   Allergen Reactions    Acetaminophen Nausea Only       OBJECTIVE    Vitals:   Vitals:    01/17/19 1838   BP: 128/80   BP Location: Left arm   Patient Position: Sitting   Cuff Size: Adult   Pulse: 88   Resp: 14   Temp: (!) 97 2 °F (36 2 °C)   TempSrc: Tympanic   Weight: 71 8 kg (158 lb 3 2 oz)   Height: 5' 6" (1 676 m)           Physical Exam   Constitutional: He is oriented to person, place, and time  He appears well-developed and well-nourished  No distress  HENT:   Head: Normocephalic and atraumatic  Cardiovascular: Normal rate, regular rhythm and normal heart sounds  Pulmonary/Chest: Effort normal and breath sounds normal  No respiratory distress  Abdominal: Soft  Bowel sounds are normal  He exhibits no distension  There is no tenderness  Musculoskeletal: He exhibits no edema  Neurological: He is alert and oriented to person, place, and time  Skin: Skin is warm and dry  Psychiatric: He has a normal mood and affect                    Luis Delay, DO, PGY-3  [facility]   1/17/2019

## 2019-02-14 DIAGNOSIS — E78.5 HYPERLIPIDEMIA, UNSPECIFIED HYPERLIPIDEMIA TYPE: ICD-10-CM

## 2019-02-14 RX ORDER — ATORVASTATIN CALCIUM 40 MG/1
TABLET, FILM COATED ORAL
Qty: 90 TABLET | Refills: 1 | Status: SHIPPED | OUTPATIENT
Start: 2019-02-14 | End: 2019-08-08 | Stop reason: SDUPTHER

## 2019-03-01 ENCOUNTER — TRANSCRIBE ORDERS (OUTPATIENT)
Dept: LAB | Facility: CLINIC | Age: 64
End: 2019-03-01

## 2019-03-02 ENCOUNTER — APPOINTMENT (OUTPATIENT)
Dept: LAB | Facility: CLINIC | Age: 64
End: 2019-03-02
Payer: COMMERCIAL

## 2019-03-02 DIAGNOSIS — Z11.59 NEED FOR HEPATITIS C SCREENING TEST: ICD-10-CM

## 2019-03-02 DIAGNOSIS — E11.9 TYPE 2 DIABETES MELLITUS WITHOUT COMPLICATION, WITHOUT LONG-TERM CURRENT USE OF INSULIN (HCC): ICD-10-CM

## 2019-03-02 LAB
ALBUMIN SERPL BCP-MCNC: 4 G/DL (ref 3.5–5)
ALP SERPL-CCNC: 80 U/L (ref 46–116)
ALT SERPL W P-5'-P-CCNC: 38 U/L (ref 12–78)
ANION GAP SERPL CALCULATED.3IONS-SCNC: 11 MMOL/L (ref 4–13)
AST SERPL W P-5'-P-CCNC: 21 U/L (ref 5–45)
BILIRUB SERPL-MCNC: 1.4 MG/DL (ref 0.2–1)
BUN SERPL-MCNC: 17 MG/DL (ref 5–25)
CALCIUM SERPL-MCNC: 9.1 MG/DL (ref 8.3–10.1)
CHLORIDE SERPL-SCNC: 100 MMOL/L (ref 100–108)
CHOLEST SERPL-MCNC: 133 MG/DL (ref 50–200)
CO2 SERPL-SCNC: 28 MMOL/L (ref 21–32)
CREAT SERPL-MCNC: 0.97 MG/DL (ref 0.6–1.3)
CREAT UR-MCNC: 148 MG/DL
GFR SERPL CREATININE-BSD FRML MDRD: 83 ML/MIN/1.73SQ M
GLUCOSE P FAST SERPL-MCNC: 190 MG/DL (ref 65–99)
HDLC SERPL-MCNC: 51 MG/DL (ref 40–60)
LDLC SERPL CALC-MCNC: 45 MG/DL (ref 0–100)
MICROALBUMIN UR-MCNC: 53.1 MG/L (ref 0–20)
MICROALBUMIN/CREAT 24H UR: 36 MG/G CREATININE (ref 0–30)
NONHDLC SERPL-MCNC: 82 MG/DL
POTASSIUM SERPL-SCNC: 3.9 MMOL/L (ref 3.5–5.3)
PROT SERPL-MCNC: 7.3 G/DL (ref 6.4–8.2)
SODIUM SERPL-SCNC: 139 MMOL/L (ref 136–145)
TRIGL SERPL-MCNC: 185 MG/DL

## 2019-03-02 PROCEDURE — 80053 COMPREHEN METABOLIC PANEL: CPT

## 2019-03-02 PROCEDURE — 80061 LIPID PANEL: CPT

## 2019-03-02 PROCEDURE — 3060F POS MICROALBUMINURIA REV: CPT | Performed by: FAMILY MEDICINE

## 2019-03-02 PROCEDURE — 82570 ASSAY OF URINE CREATININE: CPT | Performed by: FAMILY MEDICINE

## 2019-03-02 PROCEDURE — 82043 UR ALBUMIN QUANTITATIVE: CPT | Performed by: FAMILY MEDICINE

## 2019-03-02 PROCEDURE — 36415 COLL VENOUS BLD VENIPUNCTURE: CPT

## 2019-03-02 PROCEDURE — 86803 HEPATITIS C AB TEST: CPT

## 2019-03-03 LAB — HCV AB SER QL: NORMAL

## 2019-04-25 ENCOUNTER — OFFICE VISIT (OUTPATIENT)
Dept: FAMILY MEDICINE CLINIC | Facility: CLINIC | Age: 64
End: 2019-04-25

## 2019-04-25 VITALS
RESPIRATION RATE: 16 BRPM | DIASTOLIC BLOOD PRESSURE: 70 MMHG | BODY MASS INDEX: 26.23 KG/M2 | HEART RATE: 78 BPM | TEMPERATURE: 98.1 F | SYSTOLIC BLOOD PRESSURE: 138 MMHG | WEIGHT: 163.2 LBS | HEIGHT: 66 IN

## 2019-04-25 DIAGNOSIS — R80.9 TYPE 2 DIABETES MELLITUS WITH MICROALBUMINURIA, WITHOUT LONG-TERM CURRENT USE OF INSULIN (HCC): Primary | ICD-10-CM

## 2019-04-25 DIAGNOSIS — E11.29 TYPE 2 DIABETES MELLITUS WITH MICROALBUMINURIA, WITHOUT LONG-TERM CURRENT USE OF INSULIN (HCC): Primary | ICD-10-CM

## 2019-04-25 DIAGNOSIS — E78.2 MIXED HYPERLIPIDEMIA: ICD-10-CM

## 2019-04-25 LAB — SL AMB POCT HEMOGLOBIN AIC: 6.7 (ref ?–6.5)

## 2019-04-25 PROCEDURE — 99213 OFFICE O/P EST LOW 20 MIN: CPT | Performed by: FAMILY MEDICINE

## 2019-04-25 PROCEDURE — 83036 HEMOGLOBIN GLYCOSYLATED A1C: CPT | Performed by: FAMILY MEDICINE

## 2019-04-25 RX ORDER — AZELASTINE HYDROCHLORIDE 0.5 MG/ML
SOLUTION/ DROPS OPHTHALMIC
Refills: 3 | COMMUNITY
Start: 2019-02-15 | End: 2020-08-12

## 2019-04-25 RX ORDER — LISINOPRIL 5 MG/1
5 TABLET ORAL DAILY
Qty: 30 TABLET | Refills: 1 | Status: SHIPPED | OUTPATIENT
Start: 2019-04-25 | End: 2019-05-18 | Stop reason: SDUPTHER

## 2019-05-12 DIAGNOSIS — E11.9 TYPE 2 DIABETES MELLITUS WITHOUT COMPLICATION, WITHOUT LONG-TERM CURRENT USE OF INSULIN (HCC): ICD-10-CM

## 2019-05-13 RX ORDER — GLIPIZIDE 5 MG/1
TABLET, FILM COATED, EXTENDED RELEASE ORAL
Qty: 30 TABLET | Refills: 5 | Status: SHIPPED | OUTPATIENT
Start: 2019-05-13 | End: 2019-11-09 | Stop reason: SDUPTHER

## 2019-05-18 DIAGNOSIS — R80.9 TYPE 2 DIABETES MELLITUS WITH MICROALBUMINURIA, WITHOUT LONG-TERM CURRENT USE OF INSULIN (HCC): ICD-10-CM

## 2019-05-18 DIAGNOSIS — E11.29 TYPE 2 DIABETES MELLITUS WITH MICROALBUMINURIA, WITHOUT LONG-TERM CURRENT USE OF INSULIN (HCC): ICD-10-CM

## 2019-05-18 RX ORDER — LISINOPRIL 5 MG/1
TABLET ORAL
Qty: 90 TABLET | Refills: 1 | Status: SHIPPED | OUTPATIENT
Start: 2019-05-18 | End: 2019-11-10 | Stop reason: SDUPTHER

## 2019-07-15 ENCOUNTER — OFFICE VISIT (OUTPATIENT)
Dept: FAMILY MEDICINE CLINIC | Facility: CLINIC | Age: 64
End: 2019-07-15

## 2019-07-15 VITALS
HEART RATE: 92 BPM | DIASTOLIC BLOOD PRESSURE: 82 MMHG | SYSTOLIC BLOOD PRESSURE: 128 MMHG | RESPIRATION RATE: 16 BRPM | BODY MASS INDEX: 25.57 KG/M2 | TEMPERATURE: 97.4 F | WEIGHT: 158.4 LBS

## 2019-07-15 DIAGNOSIS — E11.29 TYPE 2 DIABETES MELLITUS WITH MICROALBUMINURIA, WITHOUT LONG-TERM CURRENT USE OF INSULIN (HCC): ICD-10-CM

## 2019-07-15 DIAGNOSIS — G89.29 CHRONIC BACK PAIN GREATER THAN 3 MONTHS DURATION: ICD-10-CM

## 2019-07-15 DIAGNOSIS — R80.9 TYPE 2 DIABETES MELLITUS WITH MICROALBUMINURIA, WITHOUT LONG-TERM CURRENT USE OF INSULIN (HCC): ICD-10-CM

## 2019-07-15 DIAGNOSIS — Z86.39 HISTORY OF THYROID NODULE: ICD-10-CM

## 2019-07-15 DIAGNOSIS — M54.9 CHRONIC BACK PAIN GREATER THAN 3 MONTHS DURATION: ICD-10-CM

## 2019-07-15 DIAGNOSIS — Z87.39 HISTORY OF HERNIATED INTERVERTEBRAL DISC: Primary | ICD-10-CM

## 2019-07-15 DIAGNOSIS — E78.5 HYPERLIPIDEMIA, UNSPECIFIED HYPERLIPIDEMIA TYPE: ICD-10-CM

## 2019-07-15 PROCEDURE — 99213 OFFICE O/P EST LOW 20 MIN: CPT | Performed by: FAMILY MEDICINE

## 2019-07-15 NOTE — PROGRESS NOTES
Assessment/Plan:    Type 2 diabetes mellitus with microalbuminuria, without long-term current use of insulin (HCC)  Lab Results   Component Value Date    HGBA1C 6 7 (A) 2019       No results for input(s): POCGLU in the last 72 hours  Blood Sugar Average: Last 72 hrs:     A1C trended down during last visit  Too soon to recheck at this time  Will order out patient lab for next month  Discussed Pneumovax, patient declined  Noncompliant w/ Metformin takes between 3992-5239 mg daily  Will see if dose can be adjusted after lab work  History of thyroid nodule  Per chart review remote hx of thyroid nodule  Will order TSH and f/u    Hyperlipidemia  Stable, improved compared to prior lab work  Recent lipid panel : T  Continue atorvastatin 40 mg qhs, encouraged lifestyle modifications  History of herniated intervertebral disc  Patient reported, unable to find per chart review   Does not currently do anything for back pain  Resistant to exercises, believes rest is best  Reviewed different exercises, encouraged beginners yoga, receptive to yoga  Provided PT referral, would like to pursue yoga first        Diagnoses and all orders for this visit:    History of herniated intervertebral disc  -     Ambulatory referral to Physical Therapy; Future    Chronic back pain greater than 3 months duration  -     Ambulatory referral to Physical Therapy; Future    History of thyroid nodule  -     TSH, 3rd generation with Free T4 reflex; Future    Type 2 diabetes mellitus with microalbuminuria, without long-term current use of insulin (HCC)  -     HEMOGLOBIN A1C W/ EAG ESTIMATION; Future    Hyperlipidemia, unspecified hyperlipidemia type  -     Lipid panel; Future          Subjective:      Patient ID: Mike Muse is a 59 y o  male is here for f/u regarding  DM, HLD,    Back Pain   This is a chronic problem  The current episode started more than 1 year ago  The problem occurs 2 to 4 times per day   The problem has been gradually worsening since onset  The pain is present in the lumbar spine and sacro-iliac  The quality of the pain is described as aching  The pain does not radiate  The pain is at a severity of 9/10  The pain is worse during the day  The symptoms are aggravated by bending, position and stress  Stiffness is present all day  Associated symptoms include pelvic pain  Pertinent negatives include no abdominal pain, bladder incontinence, bowel incontinence, chest pain, dysuria, fever, headaches, leg pain, numbness, paresis, paresthesias, perianal numbness, tingling, weakness or weight loss  The following portions of the patient's history were reviewed and updated as appropriate: allergies, current medications, past family history, past medical history, past social history, past surgical history and problem list     Tylenol allergy: says liquid specifically, says he felt lightheaded  Recent optho surgery, sounds like blepharoplasty  Review of Systems   Constitutional: Negative for chills, fever and weight loss  HENT: Negative for congestion, nosebleeds and rhinorrhea  Respiratory: Negative for shortness of breath  Cardiovascular: Negative for chest pain  Gastrointestinal: Negative for abdominal pain and bowel incontinence  Genitourinary: Positive for pelvic pain  Negative for bladder incontinence and dysuria  Musculoskeletal: Positive for back pain  Neurological: Negative for tingling, weakness, numbness, headaches and paresthesias           Objective:  Current Outpatient Medications on File Prior to Visit   Medication Sig Dispense Refill    atorvastatin (LIPITOR) 40 mg tablet TAKE 1 TABLET BY MOUTH EVERY DAY 90 tablet 1    azelastine (OPTIVAR) 0 05 % ophthalmic solution INSTILL ONE DROP INTO BOTH EYES TWO TIMES A DAY  3    glipiZIDE (GLUCOTROL XL) 5 mg 24 hr tablet TAKE 1 TABLET BY MOUTH EVERY DAY 30 tablet 5    lisinopril (ZESTRIL) 5 mg tablet TAKE 1 TABLET BY MOUTH EVERY DAY 90 tablet 1    metFORMIN (GLUCOPHAGE) 1000 MG tablet TAKE 1 TABLET (1,000 MG TOTAL) BY MOUTH EVERY 12 (TWELVE) HOURS 60 tablet 5     No current facility-administered medications on file prior to visit  /82 (BP Location: Left arm, Patient Position: Sitting, Cuff Size: Standard)   Pulse 92   Temp (!) 97 4 °F (36 3 °C) (Tympanic)   Resp 16   Wt 71 8 kg (158 lb 6 4 oz)   BMI 25 57 kg/m²          Physical Exam   Constitutional: He appears well-developed and well-nourished  No distress  HENT:   Head: Normocephalic and atraumatic  Eyes: Conjunctivae and EOM are normal    Cardiovascular: Normal rate, regular rhythm and normal heart sounds  Pulmonary/Chest: Effort normal and breath sounds normal  No respiratory distress  He has no wheezes  Abdominal: Soft  There is no tenderness  Musculoskeletal: He exhibits no edema or tenderness  Neurological: He is alert  Skin: Skin is warm and dry  He is not diaphoretic  Psychiatric: He has a normal mood and affect  Vitals reviewed

## 2019-07-15 NOTE — ASSESSMENT & PLAN NOTE
Lab Results   Component Value Date    HGBA1C 6 7 (A) 04/25/2019       No results for input(s): POCGLU in the last 72 hours  Blood Sugar Average: Last 72 hrs:     A1C trended down during last visit  Too soon to recheck at this time  Will order out patient lab for next month  Discussed Pneumovax, patient declined  Noncompliant w/ Metformin takes between 6350-8308 mg daily  Will see if dose can be adjusted after lab work

## 2019-07-16 PROBLEM — Z86.39 HISTORY OF THYROID NODULE: Status: ACTIVE | Noted: 2019-07-16

## 2019-07-16 PROBLEM — Z87.39 HISTORY OF HERNIATED INTERVERTEBRAL DISC: Status: ACTIVE | Noted: 2019-07-16

## 2019-07-16 NOTE — ASSESSMENT & PLAN NOTE
Patient reported, unable to find per chart review   Does not currently do anything for back pain  Resistant to exercises, believes rest is best  Reviewed different exercises, encouraged beginners yoga, receptive to yoga  Provided PT referral, would like to pursue yoga first

## 2019-07-16 NOTE — ASSESSMENT & PLAN NOTE
Stable, improved compared to prior lab work  Recent lipid panel : T  Continue atorvastatin 40 mg qhs, encouraged lifestyle modifications

## 2019-08-08 ENCOUNTER — EVALUATION (OUTPATIENT)
Dept: PHYSICAL THERAPY | Facility: CLINIC | Age: 64
End: 2019-08-08
Payer: COMMERCIAL

## 2019-08-08 DIAGNOSIS — Z87.39 HISTORY OF HERNIATED INTERVERTEBRAL DISC: ICD-10-CM

## 2019-08-08 DIAGNOSIS — E78.5 HYPERLIPIDEMIA, UNSPECIFIED HYPERLIPIDEMIA TYPE: ICD-10-CM

## 2019-08-08 DIAGNOSIS — G89.29 CHRONIC BACK PAIN GREATER THAN 3 MONTHS DURATION: ICD-10-CM

## 2019-08-08 DIAGNOSIS — M54.9 CHRONIC BACK PAIN GREATER THAN 3 MONTHS DURATION: ICD-10-CM

## 2019-08-08 PROCEDURE — 97161 PT EVAL LOW COMPLEX 20 MIN: CPT | Performed by: PHYSICAL THERAPIST

## 2019-08-08 PROCEDURE — 97112 NEUROMUSCULAR REEDUCATION: CPT | Performed by: PHYSICAL THERAPIST

## 2019-08-08 RX ORDER — ATORVASTATIN CALCIUM 40 MG/1
TABLET, FILM COATED ORAL
Qty: 90 TABLET | Refills: 2 | Status: SHIPPED | OUTPATIENT
Start: 2019-08-08 | End: 2020-06-28

## 2019-08-08 NOTE — PROGRESS NOTES
PT Evaluation     Today's date: 2019  Patient name: Murtaza Mak  : 1955  MRN: [id number]  Referring provider: Hailey Brothers MD  Dx:   Encounter Diagnosis     ICD-10-CM    1  History of herniated intervertebral disc Z87 39 Ambulatory referral to Physical Therapy   2  Chronic back pain greater than 3 months duration M54 9 Ambulatory referral to Physical Therapy    G89 29                   Assessment  Assessment details: Pt presents with s/s consistent with L2-5 hypomobility, core instability, and severe postural abnormalities  He will benefit from skilled PT services to address his impairments in order to decrease pain and restore function  Impairments: abnormal coordination, abnormal muscle firing, abnormal or restricted ROM, abnormal movement, activity intolerance, impaired physical strength, lacks appropriate home exercise program, pain with function, poor posture  and poor body mechanics  Understanding of Dx/Px/POC: fair   Prognosis: fair    Goals  STG - 5 visits  1) pt will be I with HEP  2) pt will demonstrate > 50 deg PSLR  3) pt will > 70 deg lumbar FLEX  4) pt will improve postural awareness > 50%  5) pt will improve pain levels > 50%    LTG - 10 visits  1) pt will demonstrate > 60 deg PSLR  2) pt will demonstrate normalized standing and sitting posture  3) pt will demonstrate > 2 min iso bridge  4) pt will improve pain levels > 75%    Plan  Planned therapy interventions: abdominal trunk stabilization, manual therapy, joint mobilization, neuromuscular re-education, patient education, postural training, strengthening, stretching, therapeutic activities, therapeutic exercise, functional ROM exercises, flexibility, home exercise program and body mechanics training  Frequency: 2x week  Duration in visits: 10  Treatment plan discussed with: patient        Subjective Evaluation    History of Present Illness  Mechanism of injury: Pt is a 59 y o  male with PMHx significant for DM II    He reports chronic episodic Hx of L LBP without radicular symptoms  He reports constant pain since recent exacerbation in 2019  Pain  Current pain ratin  At best pain ratin  At worst pain rating: 10  Progression: no change    Social Support    Employment status: working    Diagnostic Tests  No diagnostic tests performed  Treatments  No previous or current treatments  Patient Goals  Patient goals for therapy: decreased pain          Objective     Postural Observations  Seated posture: poor  Standing posture: poor  Correction of posture: has no consistent effect    Additional Postural Observation Details  Pt stands with mild L lateral shift without pain, severe sway back, R scapular and innominate higher than L in standing    Tenderness     Lumbar Spine  No tenderness in the spinous process  Additional Tenderness Details  Mild L4/5 step-off deformity noted    Active Range of Motion     Lumbar   Flexion: 50 degrees   Extension: 20 degrees  with pain  Left lateral flexion:  WFL  Right lateral flexion:  with pain Restriction level: minimal  Left rotation:  Restriction level: minimal  Right rotation:  Restriction level: minimal    Passive Range of Motion   Left Hip   External rotation (prone): 40 degrees   Internal rotation (prone): 20 degrees     Right Hip   External rotation (prone): 30 degrees   Internal rotation (prone): 38 degrees     Joint Play   Joints within functional limits: T12, L1 and S1     Hypomobile: L2, L3, L4 and L5     Muscle Activation   Patient able to activate left transverse abdominals and right transverse abdominals  Patient unable to activate left external obliques, left internal obliques, right external obliques and right internal obliques  Additional Muscle Activation Details  Pt unable to perform abdominal bracing, able to perform weak TA draw    Tests     Lumbar   Negative SIJ compression, sacroiliac distraction and sacral spring   Left   Negative passive SLR (40 deg)  Right   Negative passive SLR (40 deg)  Precautions:  PMHx: DM II  Dx: chronic L LBP, L2-5 hypomobility, core instability, postural abnormalities    Manual  8/8            Gr IV L2-5 PA mobs             Gr IV L lower lumbar opening mobs in SL                                                        Exercise Diary  8/8            iso TA draw 5"x10            DLS jazmyn 1H04 everardo hampton             DLS prone UE/LE extension             Supine HA stretch 20"x2                                                                                                                                                                                                      Modalities

## 2019-08-14 NOTE — ASSESSMENT & PLAN NOTE
- continue atorvastatin 40 mg q h s   - encouraged lifestyle modifications including dietary and increasing physical activity  - did not complete lab work from last visit

## 2019-08-14 NOTE — ASSESSMENT & PLAN NOTE
Lab Results   Component Value Date    HGBA1C 6 7 (A) 04/25/2019       No results for input(s): POCGLU in the last 72 hours  Blood Sugar Average: Last 72 hrs:     - labs ordered at last visit, not completed  - plan was to readjust dose depending on A1c level    Patient currently taking metformin however noncompliant, dose adjusts to "lower" dose on his own  - reports compliance w/ glipizide   - advised to complete labs to evaluate need  - needs refill, will provide short supply at this time

## 2019-08-14 NOTE — ASSESSMENT & PLAN NOTE
- remote hx of thyroid nodule  - did not complete lab work ordered during last visit  - encouraged to complete lab work

## 2019-08-14 NOTE — ASSESSMENT & PLAN NOTE
- patient has been exercising at home  - patient has regular follow-up with PT  - patient reports improvement in LBP

## 2019-08-15 ENCOUNTER — OFFICE VISIT (OUTPATIENT)
Dept: PHYSICAL THERAPY | Facility: CLINIC | Age: 64
End: 2019-08-15
Payer: COMMERCIAL

## 2019-08-15 DIAGNOSIS — M54.9 CHRONIC BACK PAIN GREATER THAN 3 MONTHS DURATION: ICD-10-CM

## 2019-08-15 DIAGNOSIS — Z87.39 HISTORY OF HERNIATED INTERVERTEBRAL DISC: Primary | ICD-10-CM

## 2019-08-15 DIAGNOSIS — G89.29 CHRONIC BACK PAIN GREATER THAN 3 MONTHS DURATION: ICD-10-CM

## 2019-08-15 PROCEDURE — 97112 NEUROMUSCULAR REEDUCATION: CPT | Performed by: PHYSICAL THERAPIST

## 2019-08-15 NOTE — PROGRESS NOTES
Daily Note     Today's date: 8/15/2019  Patient name: Erica Lopes  : 1955  MRN: [id number]  Referring provider: Jordyn Layne MD  Dx:   Encounter Diagnosis     ICD-10-CM    1  History of herniated intervertebral disc Z87 39    2  Chronic back pain greater than 3 months duration M54 9     G89 29                   Subjective: Pt reports mildly improved LBP despite poor compliance with HEP  Objective: See treatment diary below    Assessment: Pt demonstrated pain-free tolerance to core stabilization  Plan: Assess response NV  Precautions:  PMHx: DM II  Dx: chronic L LBP, L2-5 hypomobility, core instability, postural abnormalities    Manual  8/8 8/15           Gr IV L2-5 PA mobs  1x40ea           Gr IV L lower lumbar opening mobs in SL  2x40                                                      Exercise Diary  8/8 8/15           iso TA draw 5"x10 5"x20           DLS marches 2P59 ea 7V20 ea           bridges  2x10           clamshells  YTB  2x15           Supine HA stretch 20"x2 20"x4                                                                                                                                                                                                     Modalities

## 2019-08-16 ENCOUNTER — OFFICE VISIT (OUTPATIENT)
Dept: FAMILY MEDICINE CLINIC | Facility: CLINIC | Age: 64
End: 2019-08-16

## 2019-08-16 VITALS
WEIGHT: 157 LBS | TEMPERATURE: 98.8 F | BODY MASS INDEX: 25.23 KG/M2 | SYSTOLIC BLOOD PRESSURE: 114 MMHG | DIASTOLIC BLOOD PRESSURE: 70 MMHG | HEIGHT: 66 IN | RESPIRATION RATE: 16 BRPM | HEART RATE: 80 BPM

## 2019-08-16 DIAGNOSIS — E11.9 TYPE 2 DIABETES MELLITUS WITHOUT COMPLICATION, WITHOUT LONG-TERM CURRENT USE OF INSULIN (HCC): ICD-10-CM

## 2019-08-16 PROCEDURE — 3008F BODY MASS INDEX DOCD: CPT | Performed by: FAMILY MEDICINE

## 2019-08-16 PROCEDURE — 99213 OFFICE O/P EST LOW 20 MIN: CPT | Performed by: FAMILY MEDICINE

## 2019-08-16 NOTE — PROGRESS NOTES
Assessment/Plan:    Type 2 diabetes mellitus with microalbuminuria, without long-term current use of insulin ([facility])  Lab Results   Component Value Date    HGBA1C 6 7 (A) 04/25/2019       No results for input(s): POCGLU in the last 72 hours  Blood Sugar Average: Last 72 hrs:     - labs ordered at last visit, not completed  - plan was to readjust dose depending on A1c level  Patient currently taking metformin however noncompliant, dose adjusts to "lower" dose on his own  - reports compliance w/ glipizide   - advised to complete labs to evaluate need  - needs refill, will provide short supply at this time    Hyperlipidemia  - continue atorvastatin 40 mg q h s   - encouraged lifestyle modifications including dietary and increasing physical activity  - did not complete lab work from last visit      History of thyroid nodule  - remote hx of thyroid nodule  - did not complete lab work ordered during last visit  - encouraged to complete lab work    History of herniated intervertebral disc  - patient has been exercising at home  - patient has regular follow-up with PT  - patient reports improvement in LBP       Diagnoses and all orders for this visit:    Type 2 diabetes mellitus without complication, without long-term current use of insulin ([address] )  -     metFORMIN (GLUCOPHAGE) 1000 MG tablet; Take 1 tablet (1,000 mg total) by mouth every 12 (twelve) hours          Subjective:      Patient ID: Min Hathaway is a 59 y o  male  Pt presents for f/u     1  DM  2  HLD  3  History of Herinated disc    Reports compliance w/ glipizide, statin and physical therapy  Just started physical therapy last week  Reports some increased soreness S/P physical therapy  Noncompliant with metformin  Dose adjusts metformin to lower doses takes approximately 1500 mg a day        The following portions of the patient's history were reviewed and updated as appropriate: allergies, past medical history and problem list     Review of Systems Constitutional: Negative for activity change, chills, fatigue and fever  HENT: Negative for congestion and rhinorrhea  Eyes: Negative for visual disturbance  Respiratory: Negative for cough and shortness of breath  Cardiovascular: Negative for chest pain and palpitations  Gastrointestinal: Negative for nausea and vomiting  Musculoskeletal: Positive for back pain  Skin: Negative for rash and wound  Objective:      /70   Pulse 80   Temp 98 8 °F (37 1 °C)   Resp 16   Ht 5' 6" (1 676 m)   Wt 71 2 kg (157 lb)   BMI 25 34 kg/m²          Physical Exam   Constitutional: He is oriented to person, place, and time  He appears well-developed and well-nourished  No distress  HENT:   Head: Normocephalic and atraumatic  Eyes: Conjunctivae are normal    Neck: Normal range of motion  Cardiovascular: Normal rate, regular rhythm and normal heart sounds  Pulmonary/Chest: Effort normal and breath sounds normal  No respiratory distress  He has no wheezes  Abdominal: Soft  Neurological: He is alert and oriented to person, place, and time  Skin: Skin is warm and dry  He is not diaphoretic  Psychiatric: He has a normal mood and affect  Vitals reviewed

## 2019-08-19 ENCOUNTER — OFFICE VISIT (OUTPATIENT)
Dept: PHYSICAL THERAPY | Facility: CLINIC | Age: 64
End: 2019-08-19
Payer: COMMERCIAL

## 2019-08-19 ENCOUNTER — APPOINTMENT (OUTPATIENT)
Dept: LAB | Facility: CLINIC | Age: 64
End: 2019-08-19
Payer: COMMERCIAL

## 2019-08-19 DIAGNOSIS — G89.29 CHRONIC BACK PAIN GREATER THAN 3 MONTHS DURATION: ICD-10-CM

## 2019-08-19 DIAGNOSIS — E78.5 HYPERLIPIDEMIA, UNSPECIFIED HYPERLIPIDEMIA TYPE: ICD-10-CM

## 2019-08-19 DIAGNOSIS — M54.9 CHRONIC BACK PAIN GREATER THAN 3 MONTHS DURATION: ICD-10-CM

## 2019-08-19 DIAGNOSIS — Z86.39 HISTORY OF THYROID NODULE: ICD-10-CM

## 2019-08-19 DIAGNOSIS — R80.9 TYPE 2 DIABETES MELLITUS WITH MICROALBUMINURIA, WITHOUT LONG-TERM CURRENT USE OF INSULIN (HCC): ICD-10-CM

## 2019-08-19 DIAGNOSIS — E11.29 TYPE 2 DIABETES MELLITUS WITH MICROALBUMINURIA, WITHOUT LONG-TERM CURRENT USE OF INSULIN (HCC): ICD-10-CM

## 2019-08-19 DIAGNOSIS — Z87.39 HISTORY OF HERNIATED INTERVERTEBRAL DISC: Primary | ICD-10-CM

## 2019-08-19 LAB
CHOLEST SERPL-MCNC: 136 MG/DL (ref 50–200)
EST. AVERAGE GLUCOSE BLD GHB EST-MCNC: 131 MG/DL
HBA1C MFR BLD: 6.2 % (ref 4.2–6.3)
HDLC SERPL-MCNC: 48 MG/DL (ref 40–60)
LDLC SERPL CALC-MCNC: 47 MG/DL (ref 0–100)
NONHDLC SERPL-MCNC: 88 MG/DL
TRIGL SERPL-MCNC: 205 MG/DL
TSH SERPL DL<=0.05 MIU/L-ACNC: 2.56 UIU/ML (ref 0.36–3.74)

## 2019-08-19 PROCEDURE — 84443 ASSAY THYROID STIM HORMONE: CPT

## 2019-08-19 PROCEDURE — 36415 COLL VENOUS BLD VENIPUNCTURE: CPT

## 2019-08-19 PROCEDURE — 83036 HEMOGLOBIN GLYCOSYLATED A1C: CPT

## 2019-08-19 PROCEDURE — 97112 NEUROMUSCULAR REEDUCATION: CPT | Performed by: PHYSICAL THERAPIST

## 2019-08-19 PROCEDURE — 80061 LIPID PANEL: CPT

## 2019-08-19 NOTE — PROGRESS NOTES
Daily Note     Today's date: 2019  Patient name: Karen Hernandez  : 1955  MRN: [id number]  Referring provider: Jo Lerma MD  Dx:   Encounter Diagnosis     ICD-10-CM    1  History of herniated intervertebral disc Z87 39    2  Chronic back pain greater than 3 months duration M54 9     G89 29                   Subjective: Pt reports pain only with leaning forward when going up stairs  Objective: See treatment diary below    Assessment: Pt demonstrated improved core stability  Plan: Cont  POC  Precautions:  PMHx: DM II  Dx: chronic L LBP, L2-5 hypomobility, core instability, postural abnormalities    Manual  8/8 8/15 8/19          Gr IV L2-5 PA mobs  1x40ea 1x40ea          Gr IV L lower lumbar opening mobs in SL  2x40 3x40                                                     Exercise Diary  8/8 8/15 8/19          iso TA draw 5"x10 5"x20 5"x20          DLS marches 4J98 ea 3O60 ea 3x15 ea          bridges  2x10 2x12          clamshells  YTB  2x15 YTB  3x15          Supine HA stretch 20"x2 20"x4 20"x4                                                                                                                                                                                                    Modalities

## 2019-08-21 ENCOUNTER — OFFICE VISIT (OUTPATIENT)
Dept: PHYSICAL THERAPY | Facility: CLINIC | Age: 64
End: 2019-08-21
Payer: COMMERCIAL

## 2019-08-21 DIAGNOSIS — Z87.39 HISTORY OF HERNIATED INTERVERTEBRAL DISC: Primary | ICD-10-CM

## 2019-08-21 DIAGNOSIS — G89.29 CHRONIC BACK PAIN GREATER THAN 3 MONTHS DURATION: ICD-10-CM

## 2019-08-21 DIAGNOSIS — M54.9 CHRONIC BACK PAIN GREATER THAN 3 MONTHS DURATION: ICD-10-CM

## 2019-08-21 PROCEDURE — 97112 NEUROMUSCULAR REEDUCATION: CPT | Performed by: PHYSICAL THERAPIST

## 2019-08-21 NOTE — PROGRESS NOTES
Daily Note     Today's date: 2019  Patient name: Patrica Marquez  : 1955  MRN: [id number]  Referring provider: Rich Agudelo MD  Dx:   Encounter Diagnosis     ICD-10-CM    1  History of herniated intervertebral disc Z87 39    2  Chronic back pain greater than 3 months duration M54 9     G89 29                   Subjective: Pt reports an episode of LBP earlier today while helping his mom get ready for the day  Objective: See treatment diary below    Assessment: Pt demonstrated improved joint mobility, poor glute strength  Plan: Cont  POC  Precautions:  PMHx: DM II  Dx: chronic L LBP, L2-5 hypomobility, core instability, postural abnormalities    Manual  8/8 8/15 8/19 8/21         Gr IV L2-5 PA mobs  1x40ea 1x40ea 1x40ea         Gr IV L lower lumbar opening mobs in SL  2x40 3x40 3x40                                                    Exercise Diary  8/8 8/15 8/19 8/21         iso TA draw 5"x10 5"x20 5"x20 5"x20         DLS marches 4Q82 ea 5C17 ea 3x15 ea 3x20 ea         bridges  2x10 2x12 2x15         clamshells  YTB  2x15 YTB  3x15 YTB  3x15         Supine HA stretch 20"x2 20"x4 20"x4 20"x5                                                                                                                                                                                                   Modalities

## 2019-08-26 ENCOUNTER — OFFICE VISIT (OUTPATIENT)
Dept: PHYSICAL THERAPY | Facility: CLINIC | Age: 64
End: 2019-08-26
Payer: COMMERCIAL

## 2019-08-26 DIAGNOSIS — M54.9 CHRONIC BACK PAIN GREATER THAN 3 MONTHS DURATION: ICD-10-CM

## 2019-08-26 DIAGNOSIS — G89.29 CHRONIC BACK PAIN GREATER THAN 3 MONTHS DURATION: ICD-10-CM

## 2019-08-26 DIAGNOSIS — Z87.39 HISTORY OF HERNIATED INTERVERTEBRAL DISC: Primary | ICD-10-CM

## 2019-08-26 PROCEDURE — 97112 NEUROMUSCULAR REEDUCATION: CPT | Performed by: PHYSICAL THERAPIST

## 2019-08-26 NOTE — PROGRESS NOTES
Daily Note     Today's date: 2019  Patient name: Rosa Bentley  : 1955  MRN: [id number]  Referring provider: Dominick Pena MD  Dx:   Encounter Diagnosis     ICD-10-CM    1  History of herniated intervertebral disc Z87 39    2  Chronic back pain greater than 3 months duration M54 9     G89 29                   Subjective: Pt reports B LBP upon waking and helping his mother get ready in the morning  Objective: See treatment diary below  Lumbar AROM:  FF: WNL, EXT: 50%*, L SB: 100%, R SB: 90%, L rot: 100%, R rot: 100%    Assessment: Pt demonstrated pain with end-ROM standing extension, stifffness without pain with prone press-ups  Pt continues to demonstrate pain secondary to core instability and likely degenerative changes  Pt required mod cueing for correct HEP performance  Plan: Cont  POC  Precautions:  PMHx: DM II  Dx: chronic L LBP, L2-5 hypomobility, core instability, postural abnormalities    Manual  8/8 8/15 8/19 8/21 8/26        Gr IV L2-5 PA mobs  1x40ea 1x40ea 1x40ea 1x40ea        Gr IV L lower lumbar opening mobs in SL  2x40 3x40 3x40 3x40ea                                                   Exercise Diary  8/8 8/15 8/19 8/21 8/26        iso TA draw 5"x10 5"x20 5"x20 5"x20 5"x20        DLS marches 3P89 ea 6L80 ea 3x15 ea 3x20 ea 1#/  3x20        bridges  2x10 2x12 2x15 3x12        clamshells  YTB  2x15 YTB  3x15 YTB  3x15 YTB  3x15        Supine HA stretch 20"x2 20"x4 20"x4 20"x5 20"x5                                                                                                                                                                                                  Modalities

## 2019-09-04 ENCOUNTER — OFFICE VISIT (OUTPATIENT)
Dept: PHYSICAL THERAPY | Facility: CLINIC | Age: 64
End: 2019-09-04
Payer: COMMERCIAL

## 2019-09-04 ENCOUNTER — TELEPHONE (OUTPATIENT)
Dept: FAMILY MEDICINE CLINIC | Facility: CLINIC | Age: 64
End: 2019-09-04

## 2019-09-04 DIAGNOSIS — Z87.39 HISTORY OF HERNIATED INTERVERTEBRAL DISC: Primary | ICD-10-CM

## 2019-09-04 DIAGNOSIS — G89.29 CHRONIC BACK PAIN GREATER THAN 3 MONTHS DURATION: ICD-10-CM

## 2019-09-04 DIAGNOSIS — M54.9 CHRONIC BACK PAIN GREATER THAN 3 MONTHS DURATION: ICD-10-CM

## 2019-09-04 PROCEDURE — 97112 NEUROMUSCULAR REEDUCATION: CPT | Performed by: PHYSICAL THERAPIST

## 2019-09-04 NOTE — PROGRESS NOTES
Daily Note     Today's date: 2019  Patient name: Erica Lopes  : 1955  MRN: [id number]  Referring provider: Jordyn Layne MD  Dx:   Encounter Diagnosis     ICD-10-CM    1  History of herniated intervertebral disc Z87 39    2  Chronic back pain greater than 3 months duration M54 9     G89 29                   Subjective: Pt reports mod LBP while weedwhacking yesterday that resolved with 5 minutes seated rest     Objective: See treatment diary below    Assessment: Pt continues to demonstrate pain secondary to core instability  Pt demonstrated significant difficulty maintaining neutral spine during mini squats  Plan: Cont  POC  Precautions:  PMHx: DM II  Dx: chronic L LBP, L2-5 hypomobility, core instability, postural abnormalities    Manual  8/8 8/15 8/19 8/21 8/26 9/4       Gr IV L2-5 PA mobs  1x40ea 1x40ea 1x40ea 1x40ea 1x40ea       Gr IV L lower lumbar opening mobs in SL  2x40 3x40 3x40 3x40ea 1x60ea                                                  Exercise Diary  8/8 8/15 8/19 8/21 8/26 9/4       iso TA draw 5"x10 5"x20 5"x20 5"x20 5"x20 HEP       DLS marches 9M97 ea 0Q57 ea 3x15 ea 3x20 ea 1#/  3x20 1 5#/  3x20       bridges  2x10 2x12 2x15 3x12 3x15       clamshells  YTB  2x15 YTB  3x15 YTB  3x15 YTB  3x15 RTB  3x15       Supine HA stretch 20"x2 20"x4 20"x4 20"x5 20"x5 20"x5       Mini squats      1x10                                                                                                                                                                                    Modalities

## 2019-09-09 ENCOUNTER — OFFICE VISIT (OUTPATIENT)
Dept: PHYSICAL THERAPY | Facility: CLINIC | Age: 64
End: 2019-09-09
Payer: COMMERCIAL

## 2019-09-09 DIAGNOSIS — G89.29 CHRONIC BACK PAIN GREATER THAN 3 MONTHS DURATION: ICD-10-CM

## 2019-09-09 DIAGNOSIS — M54.9 CHRONIC BACK PAIN GREATER THAN 3 MONTHS DURATION: ICD-10-CM

## 2019-09-09 DIAGNOSIS — Z87.39 HISTORY OF HERNIATED INTERVERTEBRAL DISC: Primary | ICD-10-CM

## 2019-09-09 PROCEDURE — 97112 NEUROMUSCULAR REEDUCATION: CPT

## 2019-09-09 NOTE — PROGRESS NOTES
Daily Note     Today's date: 2019  Patient name: Roslyn Ruiz  : 1955  MRN: [id number]  Referring provider: Jaclyn Bailey MD  Dx:   Encounter Diagnosis     ICD-10-CM    1  History of herniated intervertebral disc Z87 39    2  Chronic back pain greater than 3 months duration M54 9     G89 29                   Subjective: Pt reports 9/10 low back pain with weedwhacking yesterday that resolved after a short seated rest  Pt reports experiencing some low back pain with going up stairs, no pain currently  Objective: See treatment diary below; Added laser to lumbar spine as per PPT    Assessment: Pt demonstrated increased bridging tolerance, fair squatting mechanics  Plan: Cont  POC as per PT  Precautions:  PMHx: DM II  Dx: chronic L LBP, L2-5 hypomobility, core instability, postural abnormalities    Manual  8/8 8/15 8/19 8/21 8/26 9/4 9/9      Gr IV L2-5 PA mobs  1x40ea 1x40ea 1x40ea 1x40ea 1x40ea np      Gr IV L lower lumbar opening mobs in SL  2x40 3x40 3x40 3x40ea 1x60ea np      Laser to lumbar spine in seated        4500J                                    Exercise Diary  8/8 8/15 8/19 8/21 8/26 9/4 9/9      iso TA draw 5"x10 5"x20 5"x20 5"x20 5"x20 HEP       DLS marches 6A50 ea 5D58 ea 3x15 ea 3x20 ea 1#/  3x20 1 5#/  3x20 2#/  3x20      bridges  2x10 2x12 2x15 3x12 3x15 3x18      clamshells  YTB  2x15 YTB  3x15 YTB  3x15 YTB  3x15 RTB  3x15 RTB  3x15      Supine HA stretch 20"x2 20"x4 20"x4 20"x5 20"x5 20"x5 20"x5      Mini squats      1x10 1x10                                                                                                                                                                                   Modalities

## 2019-09-11 ENCOUNTER — OFFICE VISIT (OUTPATIENT)
Dept: PHYSICAL THERAPY | Facility: CLINIC | Age: 64
End: 2019-09-11
Payer: COMMERCIAL

## 2019-09-11 DIAGNOSIS — G89.29 CHRONIC BACK PAIN GREATER THAN 3 MONTHS DURATION: ICD-10-CM

## 2019-09-11 DIAGNOSIS — M54.9 CHRONIC BACK PAIN GREATER THAN 3 MONTHS DURATION: ICD-10-CM

## 2019-09-11 DIAGNOSIS — Z87.39 HISTORY OF HERNIATED INTERVERTEBRAL DISC: Primary | ICD-10-CM

## 2019-09-11 PROCEDURE — 97140 MANUAL THERAPY 1/> REGIONS: CPT | Performed by: PHYSICAL THERAPIST

## 2019-09-11 PROCEDURE — 97112 NEUROMUSCULAR REEDUCATION: CPT | Performed by: PHYSICAL THERAPIST

## 2019-09-11 NOTE — PROGRESS NOTES
Daily Note     Today's date: 2019  Patient name: Kay Love  : 1955  MRN: [id number]  Referring provider: Tj Lucas MD  Dx:   Encounter Diagnosis     ICD-10-CM    1  History of herniated intervertebral disc Z87 39    2  Chronic back pain greater than 3 months duration M54 9     G89 29                   Subjective: Pt reports no LBP, mod L buttock pain with flexed ADLs helping his mother get ready in the morning  Objective: See treatment diary below    Assessment: Pt demonstrated inability to tolerate quadruped with L LE extended secondary to weakness  Plan: Assess L hip and SIJ NV  Precautions:  PMHx: DM II  Dx: chronic L LBP, L2-5 hypomobility, core instability, postural abnormalities    Manual  8/8 8/15 8/19 8/21 8/26 9/4 9/9 9/11     Gr IV L2-5 PA mobs  1x40ea 1x40ea 1x40ea 1x40ea 1x40ea np C,U  1x40 ea     Gr IV L lower lumbar opening mobs in SL  2x40 3x40 3x40 3x40ea 1x60ea np np     Laser to lumbar spine in seated        4500J np                                   Exercise Diary  8/8 8/15 8/19 8/21 8/26 9/4 9/9 9/11     iso TA draw 5"x10 5"x20 5"x20 5"x20 5"x20 HEP       DLS marches 7E97 ea 8L55 ea 3x15 ea 3x20 ea 1#/  3x20 1 5#/  3x20 2#/  3x20 2#  3x25     bridges  2x10 2x12 2x15 3x12 3x15 3x18 3x18     clamshells  YTB  2x15 YTB  3x15 YTB  3x15 YTB  3x15 RTB  3x15 RTB  3x15 HEP     Supine HA stretch 20"x2 20"x4 20"x4 20"x5 20"x5 20"x5 20"x5 20"x5     Mini squats      1x10 1x10 1x10     Quad alt UE        15"x3     Quad alt LE                                                                                                                                                                Modalities

## 2019-09-12 ENCOUNTER — APPOINTMENT (OUTPATIENT)
Dept: PHYSICAL THERAPY | Facility: CLINIC | Age: 64
End: 2019-09-12
Payer: COMMERCIAL

## 2019-09-16 ENCOUNTER — OFFICE VISIT (OUTPATIENT)
Dept: PHYSICAL THERAPY | Facility: CLINIC | Age: 64
End: 2019-09-16
Payer: COMMERCIAL

## 2019-09-16 DIAGNOSIS — Z87.39 HISTORY OF HERNIATED INTERVERTEBRAL DISC: Primary | ICD-10-CM

## 2019-09-16 DIAGNOSIS — M54.9 CHRONIC BACK PAIN GREATER THAN 3 MONTHS DURATION: ICD-10-CM

## 2019-09-16 DIAGNOSIS — G89.29 CHRONIC BACK PAIN GREATER THAN 3 MONTHS DURATION: ICD-10-CM

## 2019-09-16 PROCEDURE — 97112 NEUROMUSCULAR REEDUCATION: CPT | Performed by: PHYSICAL THERAPIST

## 2019-09-16 PROCEDURE — 97140 MANUAL THERAPY 1/> REGIONS: CPT | Performed by: PHYSICAL THERAPIST

## 2019-09-16 PROCEDURE — 97110 THERAPEUTIC EXERCISES: CPT | Performed by: PHYSICAL THERAPIST

## 2019-09-16 NOTE — PROGRESS NOTES
PT Re-Evaluation     Today's date: 2019  Patient name: Tio PichardoB: 1955  MRN: [id number]  Referring provider: Nieves Carrillo MD  Dx:   Encounter Diagnosis     ICD-10-CM    1  History of herniated intervertebral disc Z87 39    2  Chronic back pain greater than 3 months duration M54 9     G89 29                   Assessment  Assessment details: Pt demonstrates improved core stability and pain  He will continue to benefit from skilled PT services to address his impairments of core stability, HA length, and L hip and lumbar hypomobility in order to further improve pain and function    Impairments: abnormal coordination, abnormal muscle firing, abnormal or restricted ROM, abnormal movement, activity intolerance, impaired physical strength, lacks appropriate home exercise program, pain with function, poor posture  and poor body mechanics  Understanding of Dx/Px/POC: fair   Prognosis: fair    Goals  STG - 5 visits  1) pt will be I with HEP (met)  2) pt will demonstrate > 50 deg PSLR (met)  3) pt will > 70 deg lumbar FLEX (not met)  4) pt will improve postural awareness > 50% (met)  5) pt will improve pain levels > 50% (met)    LTG - 10 visits  1) pt will demonstrate > 60 deg PSLR (not met)  2) pt will demonstrate normalized standing and sitting posture (not met)  3) pt will demonstrate > 2 min iso bridge (not met)  4) pt will improve pain levels > 75% (met)    Plan  Planned therapy interventions: abdominal trunk stabilization, manual therapy, joint mobilization, neuromuscular re-education, patient education, postural training, strengthening, stretching, therapeutic activities, therapeutic exercise, functional ROM exercises, flexibility, home exercise program and body mechanics training  Frequency: 2x week  Duration in weeks: 4  Treatment plan discussed with: patient        Subjective Evaluation    History of Present Illness  Mechanism of injury: Pt reports 5/10 L SIJ pain at worst with bending and lifting ADLs in the morning, [id number] pain later in the day  Pain  Current pain ratin  At best pain ratin  At worst pain ratin  Progression: no change    Social Support    Employment status: working    Diagnostic Tests  No diagnostic tests performed  Treatments  Previous treatment: physical therapy  Current treatment: physical therapy  Patient Goals  Patient goals for therapy: decreased pain          Objective     Postural Observations  Seated posture: poor  Standing posture: poor  Correction of posture: has no consistent effect    Additional Postural Observation Details  Pt stands with mild L lateral shift without pain, moderate sway back, R scapula and innominate slightly higher than L in standing    Tenderness     Lumbar Spine  No tenderness in the spinous process  Additional Tenderness Details  Mild L4/5 step-off deformity noted    Active Range of Motion     Lumbar   Flexion: 50 degrees   Extension: 20 degrees  with pain  Left lateral flexion:  WFL  Right lateral flexion:  with pain Restriction level: minimal  Left rotation:  Restriction level: minimal  Right rotation:  Restriction level: minimal    Passive Range of Motion   Left Hip   External rotation (prone): 40 degrees   Internal rotation (prone): 20 degrees     Right Hip   External rotation (prone): 30 degrees   Internal rotation (prone): 38 degrees     Joint Play   Joints within functional limits: T12, L1 and S1     Hypomobile: L2, L3, L4 and L5   L2 comments: L>C  L3 comments: L>C  L4 comments: L>C  L5 comments: L>C  S1 comments: L innominate: 4/6, R innominate: 3/6    Muscle Activation   Patient able to activate left transverse abdominals, left external obliques, left internal obliques, right transverse abdominals, right external obliques and right internal obliques       Additional Muscle Activation Details  Pt able to perform fair abdominal brace    Tests     Lumbar   Negative SIJ compression, sacroiliac distraction, Gaenslen's , sacral thrust  and sacral spring   Left   Negative passive SLR (45 deg)  Right   Negative passive SLR (45 deg)  Precautions:  PMHx: DM II  Dx: chronic L LBP, L2-5 hypomobility, core instability, postural abnormalities     Manual  8/8 8/15 8/19 8/21 8/26 9/4 9/9 9/11  9/16     Gr IV L2-5 PA mobs   1x40ea 1x40ea 1x40ea 1x40ea 1x40ea np C,U  1x40 ea  L/   1x40  ea     Gr IV L lower lumbar opening mobs in SL   2x40 3x40 3x40 3x40ea 1x60ea np np  1x100     Laser to lumbar spine in seated              4500J np  np     L hip IR/ER MWM                  2x10 ea                                   Exercise Diary  8/8 8/15 8/19 8/21 8/26 9/4 9/9 9/11  9/16     iso TA draw 5"x10 5"x20 5"x20 5"x20 5"x20 HEP           DLS marches 3K79 ea 8I02 ea 3x15 ea 3x20 ea 1#/  3x20 1 5#/  3x20 2#/  3x20 2#  3x25       bridges   2x10 2x12 2x15 3x12 3x15 3x18 3x18  3x18     clamshells   YTB  2x15 YTB  3x15 YTB  3x15 YTB  3x15 RTB  3x15 RTB  3x15 HEP  RTB   3x20     Supine HA stretch 20"x2 20"x4 20"x4 20"x5 20"x5 20"x5 20"x5 20"x5  20"x5     Mini squats           1x10 1x10 1x10  NV     Quad alt UE               15"x3  15"x3     Quad alt LE                  15"x3     R lumbar side glides                  5"x5     L piriformis stretch                  15"x3                                                                                                                                                                                                                                   Modalities

## 2019-11-09 DIAGNOSIS — E11.9 TYPE 2 DIABETES MELLITUS WITHOUT COMPLICATION, WITHOUT LONG-TERM CURRENT USE OF INSULIN (HCC): ICD-10-CM

## 2019-11-10 DIAGNOSIS — R80.9 TYPE 2 DIABETES MELLITUS WITH MICROALBUMINURIA, WITHOUT LONG-TERM CURRENT USE OF INSULIN (HCC): ICD-10-CM

## 2019-11-10 DIAGNOSIS — E11.29 TYPE 2 DIABETES MELLITUS WITH MICROALBUMINURIA, WITHOUT LONG-TERM CURRENT USE OF INSULIN (HCC): ICD-10-CM

## 2019-11-13 PROCEDURE — 4010F ACE/ARB THERAPY RXD/TAKEN: CPT | Performed by: FAMILY MEDICINE

## 2019-11-13 RX ORDER — GLIPIZIDE 5 MG/1
TABLET, FILM COATED, EXTENDED RELEASE ORAL
Qty: 90 TABLET | Refills: 3 | Status: SHIPPED | OUTPATIENT
Start: 2019-11-13 | End: 2021-01-05

## 2019-11-13 RX ORDER — LISINOPRIL 5 MG/1
TABLET ORAL
Qty: 90 TABLET | Refills: 2 | Status: SHIPPED | OUTPATIENT
Start: 2019-11-13 | End: 2020-09-24 | Stop reason: SDUPTHER

## 2019-11-18 ENCOUNTER — OFFICE VISIT (OUTPATIENT)
Dept: FAMILY MEDICINE CLINIC | Facility: CLINIC | Age: 64
End: 2019-11-18

## 2019-11-18 VITALS
HEART RATE: 92 BPM | DIASTOLIC BLOOD PRESSURE: 70 MMHG | RESPIRATION RATE: 16 BRPM | TEMPERATURE: 96.8 F | SYSTOLIC BLOOD PRESSURE: 102 MMHG | BODY MASS INDEX: 25.53 KG/M2 | WEIGHT: 158.2 LBS

## 2019-11-18 DIAGNOSIS — E11.9 TYPE 2 DIABETES MELLITUS WITHOUT COMPLICATION, WITHOUT LONG-TERM CURRENT USE OF INSULIN (HCC): Primary | ICD-10-CM

## 2019-11-18 DIAGNOSIS — Z87.39 HISTORY OF HERNIATED INTERVERTEBRAL DISC: ICD-10-CM

## 2019-11-18 PROCEDURE — 99213 OFFICE O/P EST LOW 20 MIN: CPT | Performed by: FAMILY MEDICINE

## 2019-11-19 NOTE — PROGRESS NOTES
Assessment/Plan:    Type 2 diabetes mellitus with microalbuminuria, without long-term current use of insulin ([facility])    Lab Results   Component Value Date    HGBA1C 6 2 08/19/2019     - Ordered A1C today  - continue current regimen  - compliant w/ glipizide  - noncompliant w/ metformin, takes approximately 1500 mg daily; however A1c previously trending down, will reassess after labs    History of herniated intervertebral disc  - pt improved with PT however stopped 2/2 copay  - recommend OMT       Diagnoses and all orders for this visit:    Type 2 diabetes mellitus without complication, without long-term current use of insulin ([facility])  -     HEMOGLOBIN A1C W/ EAG ESTIMATION; Future    History of herniated intervertebral disc          Subjective:      Patient ID: Amadeo Portillo is a 62 y o  male  HPI  Patient is here for f/u regarding DM and chronic LBP  1 DM  - tolerating metformin and glipizide  - denies se; no diarrhea, constipation, dizziness, lightheadedness   - noncompliant w/ metformin  - compliant w/ glipizide     2  Chronic LBP  - s/p PT w/ improvement  - worsens w/ bending; heavy lifting       The following portions of the patient's history were reviewed and updated as appropriate: allergies and current medications  Review of Systems   Constitutional: Negative for activity change, appetite change, chills, diaphoresis, fatigue, fever and unexpected weight change  HENT: Negative for congestion, ear pain, rhinorrhea, sinus pressure, sinus pain and sore throat  Eyes: Negative for discharge  Respiratory: Negative for cough, chest tightness, shortness of breath and wheezing  Cardiovascular: Negative for chest pain and palpitations  Gastrointestinal: Negative for abdominal pain, constipation, diarrhea, nausea and vomiting  Genitourinary: Negative for difficulty urinating, frequency and urgency  Musculoskeletal: Positive for back pain (chronic issue)   Negative for arthralgias, neck pain and neck stiffness  Allergic/Immunologic: Negative for environmental allergies and food allergies  Neurological: Negative for dizziness and light-headedness  Psychiatric/Behavioral: Negative for decreased concentration and dysphoric mood  Objective:      /70 (BP Location: Left arm, Patient Position: Sitting, Cuff Size: Large)   Pulse 92   Temp (!) 96 8 °F (36 °C) (Tympanic)   Resp 16   Wt 71 8 kg (158 lb 3 2 oz)   BMI 25 53 kg/m²          Physical Exam   Constitutional: He appears well-developed and well-nourished  No distress  HENT:   Head: Normocephalic and atraumatic  Mouth/Throat: Oropharynx is clear and moist  No oropharyngeal exudate  Eyes: Conjunctivae are normal  Right eye exhibits no discharge  Left eye exhibits no discharge  Neck: Normal range of motion  Cardiovascular: Normal rate, regular rhythm and normal heart sounds  No murmur heard  Pulmonary/Chest: Effort normal and breath sounds normal  No respiratory distress  He has no wheezes  He has no rales  Abdominal: Soft  Bowel sounds are normal  There is no tenderness  Neurological: He is alert  Skin: Skin is warm and dry  He is not diaphoretic  Psychiatric: He has a normal mood and affect  Vitals reviewed

## 2019-11-19 NOTE — ASSESSMENT & PLAN NOTE
Lab Results   Component Value Date    HGBA1C 6 2 08/19/2019     - Ordered A1C today  - continue current regimen  - compliant w/ glipizide  - noncompliant w/ metformin, takes approximately 1500 mg daily; however A1c previously trending down, will reassess after labs

## 2019-11-28 ENCOUNTER — HOSPITAL ENCOUNTER (EMERGENCY)
Facility: HOSPITAL | Age: 64
Discharge: HOME | End: 2019-11-28
Attending: EMERGENCY MEDICINE | Admitting: EMERGENCY MEDICINE
Payer: COMMERCIAL

## 2019-11-28 ENCOUNTER — APPOINTMENT (EMERGENCY)
Dept: CT IMAGING | Facility: HOSPITAL | Age: 64
End: 2019-11-28
Payer: COMMERCIAL

## 2019-11-28 VITALS
SYSTOLIC BLOOD PRESSURE: 148 MMHG | HEART RATE: 75 BPM | DIASTOLIC BLOOD PRESSURE: 81 MMHG | RESPIRATION RATE: 18 BRPM | HEIGHT: 67 IN | TEMPERATURE: 99 F | OXYGEN SATURATION: 97 % | WEIGHT: 162.48 LBS | BODY MASS INDEX: 25.5 KG/M2

## 2019-11-28 DIAGNOSIS — K80.20 CHOLELITHIASIS: ICD-10-CM

## 2019-11-28 DIAGNOSIS — N28.1 BILATERAL RENAL CYSTS: ICD-10-CM

## 2019-11-28 DIAGNOSIS — N20.1 RIGHT URETERAL STONE: Primary | ICD-10-CM

## 2019-11-28 LAB
ALBUMIN SERPL BCP-MCNC: 4.3 G/DL (ref 3.5–5)
ALP SERPL-CCNC: 74 U/L (ref 46–116)
ALT SERPL W P-5'-P-CCNC: 53 U/L (ref 12–78)
ANION GAP SERPL CALCULATED.3IONS-SCNC: 14 MMOL/L (ref 4–13)
AST SERPL W P-5'-P-CCNC: 25 U/L (ref 5–45)
BASOPHILS # BLD AUTO: 0.04 THOUSANDS/ΜL (ref 0–0.1)
BASOPHILS NFR BLD AUTO: 1 % (ref 0–1)
BILIRUB SERPL-MCNC: 1.33 MG/DL (ref 0.2–1)
BUN SERPL-MCNC: 18 MG/DL (ref 5–25)
CALCIUM SERPL-MCNC: 9.7 MG/DL (ref 8.3–10.1)
CHLORIDE SERPL-SCNC: 96 MMOL/L (ref 100–108)
CO2 SERPL-SCNC: 24 MMOL/L (ref 21–32)
CREAT SERPL-MCNC: 1.41 MG/DL (ref 0.6–1.3)
EOSINOPHIL # BLD AUTO: 0.09 THOUSAND/ΜL (ref 0–0.61)
EOSINOPHIL NFR BLD AUTO: 1 % (ref 0–6)
ERYTHROCYTE [DISTWIDTH] IN BLOOD BY AUTOMATED COUNT: 13.1 % (ref 11.6–15.1)
GFR SERPL CREATININE-BSD FRML MDRD: 53 ML/MIN/1.73SQ M
GLUCOSE SERPL-MCNC: 319 MG/DL (ref 65–140)
HCT VFR BLD AUTO: 38.6 % (ref 36.5–49.3)
HGB BLD-MCNC: 13.6 G/DL (ref 12–17)
IMM GRANULOCYTES # BLD AUTO: 0.02 THOUSAND/UL (ref 0–0.2)
IMM GRANULOCYTES NFR BLD AUTO: 0 % (ref 0–2)
LIPASE SERPL-CCNC: 194 U/L (ref 73–393)
LYMPHOCYTES # BLD AUTO: 1.52 THOUSANDS/ΜL (ref 0.6–4.47)
LYMPHOCYTES NFR BLD AUTO: 20 % (ref 14–44)
MCH RBC QN AUTO: 31.6 PG (ref 26.8–34.3)
MCHC RBC AUTO-ENTMCNC: 35.2 G/DL (ref 31.4–37.4)
MCV RBC AUTO: 90 FL (ref 82–98)
MONOCYTES # BLD AUTO: 0.48 THOUSAND/ΜL (ref 0.17–1.22)
MONOCYTES NFR BLD AUTO: 6 % (ref 4–12)
NEUTROPHILS # BLD AUTO: 5.37 THOUSANDS/ΜL (ref 1.85–7.62)
NEUTS SEG NFR BLD AUTO: 72 % (ref 43–75)
NRBC BLD AUTO-RTO: 0 /100 WBCS
PLATELET # BLD AUTO: 207 THOUSANDS/UL (ref 149–390)
PMV BLD AUTO: 9.4 FL (ref 8.9–12.7)
POTASSIUM SERPL-SCNC: 3.9 MMOL/L (ref 3.5–5.3)
PROT SERPL-MCNC: 7.4 G/DL (ref 6.4–8.2)
RBC # BLD AUTO: 4.31 MILLION/UL (ref 3.88–5.62)
SODIUM SERPL-SCNC: 134 MMOL/L (ref 136–145)
WBC # BLD AUTO: 7.52 THOUSAND/UL (ref 4.31–10.16)

## 2019-11-28 PROCEDURE — 96374 THER/PROPH/DIAG INJ IV PUSH: CPT

## 2019-11-28 PROCEDURE — 83690 ASSAY OF LIPASE: CPT | Performed by: EMERGENCY MEDICINE

## 2019-11-28 PROCEDURE — 99284 EMERGENCY DEPT VISIT MOD MDM: CPT | Performed by: EMERGENCY MEDICINE

## 2019-11-28 PROCEDURE — 36415 COLL VENOUS BLD VENIPUNCTURE: CPT | Performed by: EMERGENCY MEDICINE

## 2019-11-28 PROCEDURE — 96361 HYDRATE IV INFUSION ADD-ON: CPT

## 2019-11-28 PROCEDURE — 80053 COMPREHEN METABOLIC PANEL: CPT | Performed by: EMERGENCY MEDICINE

## 2019-11-28 PROCEDURE — 96375 TX/PRO/DX INJ NEW DRUG ADDON: CPT

## 2019-11-28 PROCEDURE — 74176 CT ABD & PELVIS W/O CONTRAST: CPT

## 2019-11-28 PROCEDURE — 85025 COMPLETE CBC W/AUTO DIFF WBC: CPT | Performed by: EMERGENCY MEDICINE

## 2019-11-28 PROCEDURE — 99284 EMERGENCY DEPT VISIT MOD MDM: CPT

## 2019-11-28 RX ORDER — KETOROLAC TROMETHAMINE 30 MG/ML
15 INJECTION, SOLUTION INTRAMUSCULAR; INTRAVENOUS ONCE
Status: COMPLETED | OUTPATIENT
Start: 2019-11-28 | End: 2019-11-28

## 2019-11-28 RX ORDER — HYDROMORPHONE HCL/PF 1 MG/ML
0.5 SYRINGE (ML) INJECTION ONCE
Status: COMPLETED | OUTPATIENT
Start: 2019-11-28 | End: 2019-11-28

## 2019-11-28 RX ORDER — HYDROCODONE BITARTRATE AND ACETAMINOPHEN 5; 325 MG/1; MG/1
1 TABLET ORAL EVERY 6 HOURS PRN
Qty: 12 TABLET | Refills: 0 | Status: SHIPPED | OUTPATIENT
Start: 2019-11-28 | End: 2020-03-12

## 2019-11-28 RX ADMIN — HYDROMORPHONE HYDROCHLORIDE 0.5 MG: 1 INJECTION, SOLUTION INTRAMUSCULAR; INTRAVENOUS; SUBCUTANEOUS at 17:52

## 2019-11-28 RX ADMIN — SODIUM CHLORIDE 1000 ML: 0.9 INJECTION, SOLUTION INTRAVENOUS at 17:53

## 2019-11-28 RX ADMIN — KETOROLAC TROMETHAMINE 15 MG: 30 INJECTION, SOLUTION INTRAMUSCULAR at 17:54

## 2019-11-28 NOTE — ED PROVIDER NOTES
History  Chief Complaint   Patient presents with    Flank Pain     pt states started with R sided flank pain had a BM felt better, started with pain again after he ate dinner       History provided by:  Patient   used: No    79-year-old male diabetic presented for evaluation of right-sided flank pain since this morning, somewhat improving after having a bowel movement and then worsening in this evening after eating  Pain is significant, fairly localized with some pain in the right abdomen associated with nausea, one episode of vomiting  No history of similar pain  No history of ureteral stones  Denies any urinary complaints  Seems uncomfortable on exam, waiting to his left side stating that this seems more comfortable  No reproducible CVA tenderness or abdominal tenderness  Differential diagnosis includes ureteral stone, less likely pyelonephritis, appendicitis  Will check labs, give fluids, analgesics and CT  Prior to Admission Medications   Prescriptions Last Dose Informant Patient Reported? Taking?   atorvastatin (LIPITOR) 40 mg tablet  Self No Yes   Sig: TAKE 1 TABLET BY MOUTH EVERY DAY   azelastine (OPTIVAR) 0 05 % ophthalmic solution  Self Yes Yes   Sig: INSTILL ONE DROP INTO BOTH EYES TWO TIMES A DAY   glipiZIDE (GLUCOTROL XL) 5 mg 24 hr tablet  Self No Yes   Sig: TAKE 1 TABLET BY MOUTH EVERY DAY   lisinopril (ZESTRIL) 5 mg tablet  Self No Yes   Sig: TAKE 1 TABLET BY MOUTH EVERY DAY   metFORMIN (GLUCOPHAGE) 1000 MG tablet  Self No Yes   Sig: Take 1 tablet (1,000 mg total) by mouth every 12 (twelve) hours      Facility-Administered Medications: None       Past Medical History:   Diagnosis Date    Candidal intertrigo 10/16/2015    Diabetes mellitus ([facility] [address] )     Thyroid nodule 10/16/2015       History reviewed  No pertinent surgical history  History reviewed  No pertinent family history  I have reviewed and agree with the history as documented      Social History     Tobacco Use    Smoking status: Never Smoker    Smokeless tobacco: Never Used   Substance Use Topics    Alcohol use: Yes     Alcohol/week: 1 0 standard drinks     Types: 1 Cans of beer per week    Drug use: No        Review of Systems   Constitutional: Negative for activity change, appetite change, fatigue and fever  Respiratory: Negative for chest tightness and shortness of breath  Gastrointestinal: Positive for abdominal pain, nausea and vomiting  Genitourinary: Positive for flank pain  Negative for difficulty urinating, dysuria and hematuria  Musculoskeletal: Negative for back pain and neck pain  Skin: Negative for color change and wound  Neurological: Negative for dizziness and weakness  All other systems reviewed and are negative  Physical Exam  Physical Exam   Constitutional: He is oriented to person, place, and time  He appears well-developed and well-nourished  Seems very uncomfortable, leading to his left side  HENT:   Head: Normocephalic  Mouth/Throat: Oropharynx is clear and moist    Cardiovascular: Normal rate and regular rhythm  Pulmonary/Chest: Effort normal  No respiratory distress  Abdominal: Soft  He exhibits no distension  There is no tenderness  No CVA tenderness  Musculoskeletal: Normal range of motion  He exhibits no edema  Neurological: He is alert and oriented to person, place, and time  Skin: Skin is warm and dry  No rash noted  Nursing note and vitals reviewed        Vital Signs  ED Triage Vitals [11/28/19 1740]   Temperature Pulse Respirations Blood Pressure SpO2   99 °F (37 2 °C) 89 22 135/94 100 %      Temp Source Heart Rate Source Patient Position - Orthostatic VS BP Location FiO2 (%)   Oral -- -- -- --      Pain Score       9           Vitals:    11/28/19 1740 11/28/19 1745 11/28/19 1837   BP: 135/94  148/81   Pulse: 89 84 75         Visual Acuity      ED Medications  Medications   sodium chloride 0 9 % bolus 1,000 mL (1,000 mL Intravenous New Bag 11/28/19 1753)   ketorolac (TORADOL) injection 15 mg (15 mg Intravenous Given 11/28/19 1754)   HYDROmorphone (DILAUDID) injection 0 5 mg (0 5 mg Intravenous Given 11/28/19 1752)       Diagnostic Studies  Results Reviewed     Procedure Component Value Units Date/Time    Comprehensive metabolic panel [[id number]]  (Abnormal) Collected:  11/28/19 1756    Lab Status:  Final result Specimen:  Blood from Arm, Right Updated:  11/28/19 1851     Sodium 134 mmol/L      Potassium 3 9 mmol/L      Chloride 96 mmol/L      CO2 24 mmol/L      ANION GAP 14 mmol/L      BUN 18 mg/dL      Creatinine 1 41 mg/dL      Glucose 319 mg/dL      Calcium 9 7 mg/dL      AST 25 U/L      ALT 53 U/L      Alkaline Phosphatase 74 U/L      Total Protein 7 4 g/dL      Albumin 4 3 g/dL      Total Bilirubin 1 33 mg/dL      eGFR 53 ml/min/1 73sq m     Narrative:       [place] guidelines for Chronic Kidney Disease (CKD):     Stage 1 with normal or high GFR (GFR > 90 mL/min/1 73 square meters)    Stage 2 Mild CKD (GFR = 60-89 mL/min/1 73 square meters)    Stage 3A Moderate CKD (GFR = 45-59 mL/min/1 73 square meters)    Stage 3B Moderate CKD (GFR = 30-44 mL/min/1 73 square meters)    Stage 4 Severe CKD (GFR = 15-29 mL/min/1 73 square meters)    Stage 5 End Stage CKD (GFR <15 mL/min/1 73 square meters)  Note: GFR calculation is accurate only with a steady state creatinine    Lipase [[id number]]  (Normal) Collected:  11/28/19 1756    Lab Status:  Final result Specimen:  Blood from Arm, Right Updated:  11/28/19 1829     Lipase 194 u/L     CBC and differential [[id number]] Collected:  11/28/19 1756    Lab Status:  Final result Specimen:  Blood from Arm, Right Updated:  11/28/19 1808     WBC 7 52 Thousand/uL      RBC 4 31 Million/uL      Hemoglobin 13 6 g/dL      Hematocrit 38 6 %      MCV 90 fL      MCH 31 6 pg      MCHC 35 2 g/dL      RDW 13 1 %      MPV 9 4 fL      Platelets 327 Thousands/uL      nRBC 0 /100 WBCs      Neutrophils Relative 72 %      Immat GRANS % 0 %      Lymphocytes Relative 20 %      Monocytes Relative 6 %      Eosinophils Relative 1 %      Basophils Relative 1 %      Neutrophils Absolute 5 37 Thousands/µL      Immature Grans Absolute 0 02 Thousand/uL      Lymphocytes Absolute 1 52 Thousands/µL      Monocytes Absolute 0 48 Thousand/µL      Eosinophils Absolute 0 09 Thousand/µL      Basophils Absolute 0 04 Thousands/µL                  CT renal stone study abdomen pelvis without contrast   Final Result by Lino Jaramillo MD (11/28 1900)      2 to 3 mm distal right ureteral obstructing calculus with mild upstream hydronephrosis and hydroureter  Multiple left renal cysts of variable attenuation  Sonography could be utilized for additional characterization is some of these appear to be simple while others appear hemorrhagic and the largest in the upper pole appears to be indeterminate  Severe lower lumbar degenerative discogenic disease and spinal stenosis at L3-L4 and L4-L5  Cholelithiasis without cholecystitis  Workstation performed: [id number]                    Procedures  Procedures       ED Course  ED Course as of Nov 28 1942   Thu Nov 28, 2019 1941 Discussed CT findings with the patient including the ureteral stone causing his symptoms tonight as well as incidental findings of bilateral renal cysts, gallstones  Referred to Urology for follow-up as well as General surgery if needed  Written paper script for Adam Thurman as multiple pharmacies are not currently open due to the holiday and sending directly may delay care  MDM  Number of Diagnoses or Management Options  Bilateral renal cysts: new and requires workup  Cholelithiasis: new and requires workup  Right ureteral stone: new and requires workup  Diagnosis management comments: 73-year-old male with intermittent right-sided flank pain associated with some nausea beginning this morning    Found have a 2 mm distal right ureteral stone consistent with patient's symptoms  No signs of infection  Multiple incidental findings on CT which were discussed with the patient including renal cyst, gallstones  Patient pain well controlled in the ED  Stable for discharge  Given [place] for home and advised urology follow-up, increased fluids  Amount and/or Complexity of Data Reviewed  Clinical lab tests: ordered and reviewed  Tests in the radiology section of CPT®: ordered and reviewed    Patient Progress  Patient progress: improved      Disposition  Final diagnoses:   Right ureteral stone   Bilateral renal cysts   Cholelithiasis     Time reflects when diagnosis was documented in both MDM as applicable and the Disposition within this note     Time User Action Codes Description Comment    11/28/2019  7:04 PM Khushboo Yusuf J Add [N20 1] Right ureteral stone     11/28/2019  7:04 PM Khushboo Yusuf J Add [N28 1] Bilateral renal cysts     11/28/2019  7:05 PM Sapphire Ramirez Add [K80 20] Cholelithiasis       ED Disposition     ED Disposition Condition Date/Time Comment    Discharge Stable Thu Nov 28, 2019  7:35 PM Carmine Zelaya discharge to home/self care              Follow-up Information     Follow up With Specialties Details Why Contact Info Additional [address] [facility]  ureteral stone and renal cysts [address] [facility]  [address] [id number]  [phone] [facility] [address], [address] [place] [address] Emergency Department Emergency Medicine  If symptoms worsen [address] [facility] [address] [phone] AN ED, [address], [place], [place], [address] [facility], DO General Surgery  As needed for gallstones [facility]  [address] [facility]  Anibal Lopez 3  [phone]             Patient's Medications   Discharge Prescriptions HYDROCODONE-ACETAMINOPHEN (NORCO) 5-325 MG PER TABLET    Take 1 tablet by mouth every 6 (six) hours as needed for painMax Daily Amount: 4 tablets       Start Date: 11/28/2019End Date: --       Order Dose: 1 tablet       Quantity: 12 tablet    Refills: 0     No discharge procedures on file      ED Provider  Electronically Signed by           Brittani Christopher MD  11/28/19 0831

## 2019-12-06 ENCOUNTER — APPOINTMENT (OUTPATIENT)
Dept: LAB | Facility: CLINIC | Age: 64
End: 2019-12-06
Payer: COMMERCIAL

## 2019-12-06 DIAGNOSIS — E11.9 TYPE 2 DIABETES MELLITUS WITHOUT COMPLICATION, WITHOUT LONG-TERM CURRENT USE OF INSULIN (HCC): ICD-10-CM

## 2019-12-06 LAB
EST. AVERAGE GLUCOSE BLD GHB EST-MCNC: 126 MG/DL
HBA1C MFR BLD: 6 % (ref 4.2–6.3)

## 2019-12-06 PROCEDURE — 36415 COLL VENOUS BLD VENIPUNCTURE: CPT

## 2019-12-06 PROCEDURE — 83036 HEMOGLOBIN GLYCOSYLATED A1C: CPT

## 2019-12-10 ENCOUNTER — OFFICE VISIT (OUTPATIENT)
Dept: FAMILY MEDICINE CLINIC | Facility: CLINIC | Age: 64
End: 2019-12-10

## 2019-12-10 VITALS — BODY MASS INDEX: 24.61 KG/M2 | HEIGHT: 67 IN | WEIGHT: 156.8 LBS

## 2019-12-10 DIAGNOSIS — M99.03 SOMATIC DYSFUNCTION OF LUMBAR REGION: Primary | ICD-10-CM

## 2019-12-11 NOTE — PROGRESS NOTES
The Assessment/Plan        Problem List Items Addressed This Visit     None      Visit Diagnoses     Somatic dysfunction of lumbar region    -  Primary           This is a 62 y o  male who presents for an initial OMT appointment  1  Patient tolerated OMT well for the above problems, advised stretching and heat application for continued relief  2  OMT Follow up next avaliable OMT appointment  Judy Hoyos is a 62 y o  male and is here for an initial OMT visit  The patient reports he has had low back pain since he was a teenager  In the 1990s he had MRI imaging which showed bulging discs, no imaging or work-up since then  Previously around the time of his imaging he was experiencing radiating pain into his leg and his left hip would feel weak and give out, he denies any of those symptoms now  He denies any leg numbness or weakness  His lower back pain has remained the same, mostly on the left side of the lower back, although he recently had a kidney stone which caused him to have bilateral back pain  He was recently going to physical therapy appointments which was temporarily improving his back pain, but had to stop going due to cost  No other complaints today  Is the patient taking Pain medication? no  Has the patient completed physical therapy for this condition? no  Did Patient symptoms improve from last OMT appointment? Initial visit    The following portions of the patient's history were reviewed and updated as appropriate: allergies, current medications, past family history, past medical history, past social history, past surgical history and problem list     Review of Systems  Review of Systems   Musculoskeletal: Positive for back pain  Neurological: Negative for weakness and numbness           Objective     OMT Exam   Lumbar:  Somatic Dysfunction:  Tissue Texture Changes, Asymmetry , Restriction and Tenderness  Severity :  3  Osteopathic Findings: Lumbar paraspinal muscle hypetonicity with ropy musculature, L>R, T3-5 N RLSR, myofascial restriction  Treatment Method: ME, ST, MFR and direct inhibition  Response: improved  Pelvis:  No innominate dysfunction identified, no SI or piriformis tenderpoints     Hermelinda Hatfield, DO PGY-3  [address]

## 2020-01-13 ENCOUNTER — TELEPHONE (OUTPATIENT)
Dept: FAMILY MEDICINE CLINIC | Facility: CLINIC | Age: 65
End: 2020-01-13

## 2020-01-13 ENCOUNTER — OFFICE VISIT (OUTPATIENT)
Dept: FAMILY MEDICINE CLINIC | Facility: CLINIC | Age: 65
End: 2020-01-13

## 2020-01-13 VITALS — BODY MASS INDEX: 24.53 KG/M2 | WEIGHT: 156.6 LBS

## 2020-01-13 DIAGNOSIS — M99.03 SOMATIC DYSFUNCTION OF LUMBAR REGION: Primary | ICD-10-CM

## 2020-01-13 DIAGNOSIS — M99.02 SOMATIC DYSFUNCTION OF THORACIC REGION: ICD-10-CM

## 2020-01-13 NOTE — PROGRESS NOTES
The Assessment/Plan        Problem List Items Addressed This Visit     None      Visit Diagnoses     Somatic dysfunction of lumbar region    -  Primary    Somatic dysfunction of thoracic region               This is a 62 y o  male who presents for OMT follow-up  1  Patient tolerated OMT well for the above problems, advised stretching and heat application for continued relief  2  OMT Follow up next avaliable OMT appointment  I told the patient I would reach out to his PCP to discuss repeat imaging given progressively worsening back pain as well as restarting physical therapy  Will also send message to office to look into cost of physical therapy/how [facility] insurance would cover  Subjective     Melinda Iglesias is a 62 y o  male and is here for a OMT follow up  The patient reports he is experiencing no different or new quality of back pain, continued pain that he feels is slowly getting worse over time  He notices it most when walking up stairs, has to break about 3/4 of the way up, and bending over to help his mom (give her insulin) makes it worse  He denies any weakness or numbness of the legs  He used to do home physical therapy exercises but has stopped  He is open to trying physical therapy, but is concerned about the cost as he does not have any spare money  Is the patient taking Pain medication? no  Has the patient completed physical therapy for this condition? no  Did Patient symptoms improve from last OMT appointment? no    The following portions of the patient's history were reviewed and updated as appropriate: allergies, current medications, past family history, past medical history, past social history, past surgical history and problem list     Review of Systems  Review of Systems   Musculoskeletal: Positive for back pain  Negative for gait problem  Neurological: Negative for weakness and numbness           Objective     OMT Exam   Thoracic T10-12:  Somatic Dysfunction:  Tissue Texture Changes, Asymmetry , Restriction and Tenderness  Severity :  2  Osteopathic Findings: Paraspinal hypertonicity L>R, myofascial restriction  Treatment Method: ME, ST and MFR  Response: improved  Lumbar:  Somatic Dysfunction:  Tissue Texture Changes, Asymmetry , Restriction and Tenderness  Severity :  3  Osteopathic Findings: Paraspinal hypertonicity L>R with myofascial restriction, ropy musculature left, L2-4 NRLSR  Treatment Method: ME, ST, MFR and direct inhibition   Response: improved    Yelena Garcia,  PGY-3  Cathie Lester

## 2020-01-13 NOTE — LETTER
January 13, 2020     Keturah Pulido, [address] [facility] [address] [facility] [address]    Patient: Robert Mcclellan   YOB: 1955   Date of Visit: 1/13/2020       Dear Dr Crista Luna:    Thank you for referring Fransico Shearer to me for evaluation  Below are my notes for this consultation  I discussed possibly re-imaging his lumbar spine, he is going to try to obtain old records of MRI, and I am going to look into how much physical therapy would cost him  Just wanted to update you  Thanks! Sincerely,        Yashira Wayne DO        CC: No Recipients  Yashira Wayne DO  1/13/2020  6:00 PM  Sign at close encounter  The Assessment/Plan        Problem List Items Addressed This Visit     None      Visit Diagnoses     Somatic dysfunction of lumbar region    -  Primary    Somatic dysfunction of thoracic region               This is a 62 y o  male who presents for OMT follow-up  1  Patient tolerated OMT well for the above problems, advised stretching and heat application for continued relief  2  OMT Follow up next avaliable OMT appointment  I told the patient I would reach out to his PCP to discuss repeat imaging given progressively worsening back pain as well as restarting physical therapy  Will also send message to office to look into cost of physical therapy/how [facility] insurance would cover  Subjective     Robert Mcclellan is a 62 y o  male and is here for a OMT follow up  The patient reports he is experiencing no different or new quality of back pain, continued pain that he feels is slowly getting worse over time  He notices it most when walking up stairs, has to break about 3/4 of the way up, and bending over to help his mom (give her insulin) makes it worse  He denies any weakness or numbness of the legs  He used to do home physical therapy exercises but has stopped  He is open to trying physical therapy, but is concerned about the cost as he does not have any spare money        Is the patient taking Pain medication? no  Has the patient completed physical therapy for this condition? no  Did Patient symptoms improve from last OMT appointment? no    The following portions of the patient's history were reviewed and updated as appropriate: allergies, current medications, past family history, past medical history, past social history, past surgical history and problem list     Review of Systems  Review of Systems   Musculoskeletal: Positive for back pain  Negative for gait problem  Neurological: Negative for weakness and numbness           Objective     OMT Exam   Thoracic T10-12:  Somatic Dysfunction:  Tissue Texture Changes, Asymmetry , Restriction and Tenderness  Severity :  2  Osteopathic Findings: Paraspinal hypertonicity L>R, myofascial restriction  Treatment Method: ME, ST and MFR  Response: improved  Lumbar:  Somatic Dysfunction:  Tissue Texture Changes, Asymmetry , Restriction and Tenderness  Severity :  3  Osteopathic Findings: Paraspinal hypertonicity L>R with myofascial restriction, ropy musculature left, L2-4 NRLSR  Treatment Method: ME, ST, MFR and direct inhibition   Response: improved    Jennifer Ball DO PGY-3  Ilijamila Lester

## 2020-01-13 NOTE — TELEPHONE ENCOUNTER
I saw this patient for OMT clinic today, and we discussed starting physical therapy but he wants to know how much it would cost prior to starting  Could you look into this for him/provide him with information? Perhaps he should talk to Tamara Naqvi  Thanks!

## 2020-01-14 DIAGNOSIS — M99.02 SOMATIC DYSFUNCTION OF SPINE, THORACIC: ICD-10-CM

## 2020-01-14 DIAGNOSIS — M99.03 SOMATIC DYSFUNCTION OF SPINE, LUMBAR: Primary | ICD-10-CM

## 2020-01-14 NOTE — TELEPHONE ENCOUNTER
Since patient has insurance, entered order for physical therapy  Mailed patient copy of PT order, PT sites to contact and copy of his insurance card  Did explain that PT will be able to tell him if services are covered

## 2020-01-14 NOTE — TELEPHONE ENCOUNTER
Yes, he probably should talk to Gemma but I'm not sure if she'll know this information, Physical Therapy would be able to let him know but he does have insurance so it'll go through them first

## 2020-02-04 ENCOUNTER — OFFICE VISIT (OUTPATIENT)
Dept: FAMILY MEDICINE CLINIC | Facility: CLINIC | Age: 65
End: 2020-02-04

## 2020-02-04 VITALS — WEIGHT: 161 LBS | HEIGHT: 67 IN | TEMPERATURE: 97.1 F | BODY MASS INDEX: 25.27 KG/M2

## 2020-02-04 DIAGNOSIS — M99.02 SOMATIC DYSFUNCTION OF THORACIC REGION: ICD-10-CM

## 2020-02-04 DIAGNOSIS — M99.03 SOMATIC DYSFUNCTION OF LUMBAR REGION: Primary | ICD-10-CM

## 2020-02-04 NOTE — PROGRESS NOTES
The Assessment/Plan        Problem List Items Addressed This Visit     None      Visit Diagnoses     Somatic dysfunction of lumbar region    -  Primary    Somatic dysfunction of thoracic region               This is a 62 y o  male who presents for OMT follow-up  1  Patient tolerated OMT well for the above problems, advised stretching and heat application for continued relief  2  OMT Follow up in 4 weeks  Judy Morrell is a 62 y o  male and is here for a OMT follow up  The patient reports he back pain is worse compared to last visit, he notices this is triggered by bending forward  He thinks it is much worse because of having to care 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 no  Has the patient completed physical therapy for this condition? no  Did Patient symptoms improve from last OMT appointment? no    The following portions of the patient's history were reviewed and updated as appropriate: allergies, current medications, past family history, past medical history, past social history, past surgical history and problem list     Review of Systems  Review of Systems   Musculoskeletal: Positive for back pain  Negative for gait problem           Objective     OMT Exam   Thoracic T10-12:  Somatic Dysfunction:  Tissue Texture Changes, Asymmetry , Restriction and Tenderness  Severity :  2  Osteopathic Findings: Paraspinal hypertonicity bilaterally L>R, myofascial restriction, ropy L paraspinal musculature   Treatment Method: ST and MFR  Response: improved  Lumbar:  Somatic Dysfunction:  Tissue Texture Changes, Asymmetry , Restriction and Tenderness  Severity :  3  Osteopathic Findings: Paraspinal hypertonicity bilaterally L>R, myofascial restriction, ropy L paraspinal musculature, L3-5 N SRLR  Treatment Method: ME, ST, MFR and direct inhibition  Response: improved    Senora Contras, DO PGY-3  Cathie Lester

## 2020-03-12 ENCOUNTER — OFFICE VISIT (OUTPATIENT)
Dept: FAMILY MEDICINE CLINIC | Facility: CLINIC | Age: 65
End: 2020-03-12

## 2020-03-12 VITALS — HEIGHT: 66 IN | WEIGHT: 160 LBS | BODY MASS INDEX: 25.71 KG/M2

## 2020-03-12 DIAGNOSIS — M99.03 SOMATIC DYSFUNCTION OF LUMBAR REGION: Primary | ICD-10-CM

## 2020-03-12 DIAGNOSIS — M99.02 SOMATIC DYSFUNCTION OF THORACIC REGION: ICD-10-CM

## 2020-03-13 NOTE — PROGRESS NOTES
The Assessment/Plan        Problem List Items Addressed This Visit     None      Visit Diagnoses     Somatic dysfunction of lumbar region    -  Primary    Somatic dysfunction of thoracic region               This is a 62 y o  male who presents for OMT follow-up  1  Patient tolerated OMT well for the above problems, advised stretching and heat application for continued relief  Advised to discuss persistent back pain with his PCP as well, re-imaging may be indicated  He expressed agreement with the plan  2  OMT Follow up in 4 weeks  Subjective     David Tong is a 62 y o  male and is here for a OMT follow up  The patient reports he has not seen improvement in his lower back pain, feels worse today  Same quality of back pain, no new symptoms, denies any numbness or weakness of the lower extremities  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 no  Has the patient completed physical therapy for this condition? no  Did Patient symptoms improve from last OMT appointment? no    The following portions of the patient's history were reviewed and updated as appropriate: allergies, current medications, past family history, past medical history, past social history, past surgical history and problem list     Review of Systems  Review of Systems   Musculoskeletal: Positive for back pain           Objective     OMT Exam   Thoracic T10-12:  Somatic Dysfunction:  Tissue Texture Changes, Asymmetry , Restriction and Tenderness  Severity :  2  Osteopathic Findings: Paraspinal ropy musculature with myofascial restriction L>R  Treatment Method: ST and MFR  Response: improved  Lumbar:  Somatic Dysfunction:  Tissue Texture Changes, Asymmetry , Restriction and Tenderness  Severity :  3  Osteopathic Findings: Paraspinal ropy musculature with myofascial restriction L>R, L1-4 NRLSR  Treatment Method: ME, ST and MFR  Response: improved    Daniel Keith DO PGY-3  Cathie Lester

## 2020-06-28 DIAGNOSIS — E78.5 HYPERLIPIDEMIA, UNSPECIFIED HYPERLIPIDEMIA TYPE: ICD-10-CM

## 2020-06-28 RX ORDER — ATORVASTATIN CALCIUM 40 MG/1
TABLET, FILM COATED ORAL
Qty: 90 TABLET | Refills: 3 | Status: SHIPPED | OUTPATIENT
Start: 2020-06-28 | End: 2021-07-16

## 2020-08-11 ENCOUNTER — TRANSCRIBE ORDERS (OUTPATIENT)
Dept: LAB | Facility: CLINIC | Age: 65
End: 2020-08-11

## 2020-08-13 ENCOUNTER — OFFICE VISIT (OUTPATIENT)
Dept: FAMILY MEDICINE CLINIC | Facility: CLINIC | Age: 65
End: 2020-08-13

## 2020-08-13 VITALS
DIASTOLIC BLOOD PRESSURE: 70 MMHG | TEMPERATURE: 98.1 F | SYSTOLIC BLOOD PRESSURE: 110 MMHG | HEIGHT: 66 IN | RESPIRATION RATE: 18 BRPM | HEART RATE: 96 BPM | WEIGHT: 160.2 LBS | BODY MASS INDEX: 25.75 KG/M2

## 2020-08-13 DIAGNOSIS — E11.9 TYPE 2 DIABETES MELLITUS WITHOUT COMPLICATION, WITHOUT LONG-TERM CURRENT USE OF INSULIN (HCC): Primary | ICD-10-CM

## 2020-08-13 LAB — SL AMB POCT HEMOGLOBIN AIC: 7.8 (ref ?–6.5)

## 2020-08-13 PROCEDURE — 3051F HG A1C>EQUAL 7.0%<8.0%: CPT | Performed by: FAMILY MEDICINE

## 2020-08-13 PROCEDURE — 3725F SCREEN DEPRESSION PERFORMED: CPT | Performed by: FAMILY MEDICINE

## 2020-08-13 PROCEDURE — 1036F TOBACCO NON-USER: CPT | Performed by: FAMILY MEDICINE

## 2020-08-13 PROCEDURE — 99213 OFFICE O/P EST LOW 20 MIN: CPT | Performed by: FAMILY MEDICINE

## 2020-08-13 PROCEDURE — 83036 HEMOGLOBIN GLYCOSYLATED A1C: CPT | Performed by: FAMILY MEDICINE

## 2020-08-13 PROCEDURE — 3008F BODY MASS INDEX DOCD: CPT | Performed by: FAMILY MEDICINE

## 2020-08-13 NOTE — PATIENT INSTRUCTIONS
Diabetes in the Older Adult   AMBULATORY CARE:   What you need to know if you are an older adult with diabetes:  Older adults with diabetes are at risk for heart disease, stroke, kidney disease, blindness, and nerve damage  You may also be at risk for any of the following:  · Poor nutrition or low blood sugar levels    · Confusion or problems with memory, attention, or learning new things    · Trouble controlling urination or frequent urinary tract infections    · Trouble with coordination or balance    · Falls and injuries    · Pain    · Depression    · Open sores on your legs or feet  The ABCs of diabetes: The ABCs stand for certain things you can do to manage or prevent problems caused by diabetes:  · A  stands for A1c test   This test shows the average amount of sugar in your blood over the past 2 to 3 months  High levels of sugar in your blood can cause damage to your heart, blood vessels, kidneys, feet, and eyes  Most older adults with diabetes should have an A1c level less than 7 5  Ask your healthcare provider if this A1c goal is right for you  Your provider can help you make changes if your A1c is too high  · B  stands for blood pressure   High blood pressure can increase your risk for a heart attack, stroke, or kidney disease  Most older adults with diabetes should have a systolic blood pressure (first number) of 140  Your diastolic blood pressure (second number) should be below 90  Ask your healthcare provider if these blood pressure goals are right for you  · C  stands for cholesterol   High levels of cholesterol can block your arteries and cause a heart attack or stroke  Ask your healthcare provider what your cholesterol levels should be  · S  stands for stop smoking   Nicotine and other chemicals in cigarettes and cigars can cause lung damage and make it more difficult to manage your diabetes    Call 911 if you have any of the following:   · You have any of the following signs of a stroke: ¨ Numbness or drooping on one side of your face     ¨ Weakness in an arm or leg    ¨ Confusion or difficulty speaking    ¨ Dizziness, a severe headache, or vision loss    · You have any of the following signs of a heart attack:      ¨ Squeezing, pressure, or pain in your chest that lasts longer than 5 minutes or returns    ¨ Discomfort or pain in your back, neck, jaw, stomach, or arm     ¨ Trouble breathing    ¨ Nausea or vomiting    ¨ Lightheadedness or a sudden cold sweat, especially with chest pain or trouble breathing  Seek care immediately if:   · You have severe abdominal pain, or the pain spreads to your back  You may also be vomiting  · You have trouble staying awake or focusing  · You are shaking or sweating  · You have blurred or double vision  · Your breath has a fruity, sweet smell  · Your breathing is deep and labored, or rapid and shallow  · Your heartbeat is fast and weak  · You fall and get hurt  Contact your healthcare provider if:   · You are vomiting or have diarrhea  · You have an upset stomach and cannot eat the foods on your meal plan  · You feel weak or more tired than usual      · You feel dizzy, have headaches, or are easily irritated  · Your skin is red, warm, dry, or swollen  · You have a wound that does not heal      · You have numbness in your arms or legs  · You have trouble coping with your illness, or you feel anxious or depressed  · You have problems with your memory  · You have changes in your vision  · You have questions or concerns about your condition or care  Treatment for diabetes  includes keeping your blood sugar at a normal level  You must eat the right foods, and exercise regularly  You may need medicine if you cannot control your blood sugar level with nutrition and exercise  You may also need medicine to lower your blood pressure or cholesterol, or medicine to prevent blood clots     Manage the ABCs and prevent problems caused by diabetes:   · Check your blood sugar levels as directed  Your healthcare provider will tell you when and how often to check during the day  Your healthcare provider will also tell you what your blood sugar levels should be before and after a meal  You may need to check for ketones in your urine or blood if your level is higher than directed  Write down your results and show them to your healthcare provider  Your provider may use the results to make changes to your medicine, food, or exercise schedules  Ask your healthcare provider for more information about how to treat a high or low blood sugar level  · Follow your meal plan as directed  A dietitian will help you make a meal plan to keep your blood sugar level steady and make sure you get enough nutrition  Do not skip meals  Your blood sugar level may drop too low if you have taken diabetes medicine and do not eat  Ask your healthcare provider about programs in your community that can deliver the meals to your home  · Try to be active for 30 to 60 minutes most days of the week  Exercise can help keep your blood sugar level steady, decrease your risk of heart disease, and help you lose weight  It can also help improve your balance and decrease your risk for falls  Work with your healthcare provider to create an exercise plan  Ask a family member or friend to exercise with you  Start slow and exercise for 5 to 10 minutes at a time  Examples of activities include walking or swimming  Include muscle strengthening activities 2 to 3 days each week  Include balance training 2 to 3 times each week  Activities that help increase balance include yoga and carolian chi      · Maintain a healthy weight  Ask your healthcare provider how much you should weigh  A healthy weight can help you control your diabetes and prevent heart disease  Ask your provider to help you create a weight loss plan if you are overweight   Together you can set manageable weight loss goals  · Do not smoke  Ask your healthcare provider for information if you currently smoke and need help to quit  Do not use e-cigarettes or smokeless tobacco in place of cigarettes or to help you quit  They still contain nicotine  · Manage stress  Stress may increase your blood sugar level  Deep breathing, muscle relaxation, and music may help you relax  Ask your healthcare provider for more information about these practices  Other ways to manage your diabetes:   · Check your feet every day for sores  Look at your whole foot, including the bottom, and between and under your toes  Check for wounds, corns, and calluses  Use a mirror to see the bottom of your feet  The skin on your feet may be shiny, tight, dry, or darker than normal  Your feet may also be cold and pale  Feel your feet by running your hands along the tops, bottoms, sides, and between your toes  Redness, swelling, and warmth are signs of blood flow problems that can lead to a foot ulcer  Do not try to remove corns or calluses yourself  · Wear medical alert identification  Wear medical alert jewelry or carry a card that says you have diabetes  Ask your healthcare provider where to get these items  · Ask about vaccines  You have a higher risk for serious illness if you get the flu, pneumonia, or hepatitis  Ask your healthcare provider if you should get a flu, pneumonia, shingles, or hepatitis B vaccine, and when to get the vaccine  · Keep all appointments  You may need to return to have your A1c checked every 3 months  You will need to return at least once each year to have your feet checked  You will need an eye exam once a year to check for retinopathy  You will also need urine tests every year to check for kidney problems  You may need tests to monitor for heart disease  Write down your questions so you remember to ask them during your visits  · Get help from family and friends    You may need help checking your blood sugar level, giving insulin injections, or preparing your meals  Ask your family and friends to help you with these tasks  Talk to your healthcare provider if you do not have someone at home to help you  A healthcare provider can come to your home to help you with these tasks  Follow up with your healthcare provider as directed: You may need to return to have your A1c checked every 3 months  You will need to return at least once each year to have your feet checked  You will need an eye exam once a year to check for retinopathy  You will also need urine tests every year to check for kidney problems  You may need tests to monitor for heart disease  Write down your questions so you remember to ask them during your visits  © 2017 [facility] Information is for End User's use only and may not be sold, redistributed or otherwise used for commercial purposes  All illustrations and images included in CareNotes® are the copyrighted property of [employer]  or Kennedy Schmidt  The above information is an  only  It is not intended as medical advice for individual conditions or treatments  Talk to your doctor, nurse or pharmacist before following any medical regimen to see if it is safe and effective for you

## 2020-08-14 NOTE — ASSESSMENT & PLAN NOTE
Lab Results   Component Value Date    HGBA1C 7 8 (A) 08/13/2020     - Previously well controlled  - does not tolerate metformin  - start Jardiance   - f/u in 3 months

## 2020-08-14 NOTE — PROGRESS NOTES
Assessment/Plan:    Type 2 diabetes mellitus without complication, without long-term current use of insulin (HCC)    Lab Results   Component Value Date    HGBA1C 7 8 (A) 08/13/2020     - Previously well controlled  - does not tolerate metformin  - start Jardiance   - f/u in 3 months         Diagnoses and all orders for this visit:    Type 2 diabetes mellitus without complication, without long-term current use of insulin (HCC)  -     POCT hemoglobin A1C  -     Basic metabolic panel; Future  -     Discontinue: Empagliflozin 10 MG TABS; Take 1 tablet (10 mg total) by mouth every morning  -     Empagliflozin 10 MG TABS; Take 1 tablet (10 mg total) by mouth every morning            Subjective:      Patient ID: Kayleen Silvestre is a 62 y o  male  HPI     61 yo M presents to clinic for DM f/u  Noncompliant w/ metformin  Poor diet  No acute complaints  See ROS below  The following portions of the patient's history were reviewed and updated as appropriate: allergies and current medications  Review of Systems   Constitutional: Negative for chills, diaphoresis, fatigue and fever  HENT: Negative for congestion  Respiratory: Negative for cough and shortness of breath  Cardiovascular: Negative for chest pain and palpitations  Gastrointestinal: Negative for abdominal pain, constipation, diarrhea, nausea and vomiting  Skin: Negative for rash  Neurological: Negative for headaches  Objective:      /70 (BP Location: Left arm, Patient Position: Sitting, Cuff Size: Large)   Pulse 96   Temp 98 1 °F (36 7 °C) (Tympanic)   Resp 18   Ht 5' 6" (1 676 m)   Wt 72 7 kg (160 lb 3 2 oz)   BMI 25 86 kg/m²          Physical Exam  Vitals signs reviewed  Constitutional:       General: He is not in acute distress  Appearance: He is normal weight  He is not ill-appearing  HENT:      Head: Normocephalic and atraumatic        Mouth/Throat:      Mouth: Mucous membranes are moist       Pharynx: No posterior oropharyngeal erythema  Eyes:      Conjunctiva/sclera: Conjunctivae normal    Neck:      Musculoskeletal: Normal range of motion  Cardiovascular:      Rate and Rhythm: Normal rate and regular rhythm  Heart sounds: No murmur  Abdominal:      General: Abdomen is flat  Tenderness: There is no abdominal tenderness  Skin:     General: Skin is warm and dry  Neurological:      Mental Status: He is alert  Mental status is at baseline     Psychiatric:         Mood and Affect: Mood normal

## 2020-09-01 ENCOUNTER — PATIENT OUTREACH (OUTPATIENT)
Dept: FAMILY MEDICINE CLINIC | Facility: CLINIC | Age: 65
End: 2020-09-01

## 2020-09-01 NOTE — PROGRESS NOTES
CM called and spoke with Marci [place], inquiring on progress of setting up appts for his mother for eye physician, podiatry and dentist     Marci Ace reported Podiatrist does home visits, saw patient this past Friday  Marci Ace stated patient saw eye physician beginning of this year, however she does need to get her glasses repaired   CM inquired about dentist, Marci Ace stated he is having dental issues at the present time,pain, facial swelling, he is waiting to hear from the dental office  Patient stated he set up an appt for himself for  A routine visit, and now he is having symptoms  CM suggested calling the dental office and make them aware he now has a dental emergency  CM also gave [facility] phone number to call and ask for an emergency appt, if he cannot get an appt with his general dentist     CM suggested scheduling a dental appt for his mother while he is at the office  CM encouraged Bill to call CM with any questions or concerns  CM will continue to follow

## 2020-09-24 ENCOUNTER — APPOINTMENT (OUTPATIENT)
Dept: LAB | Facility: CLINIC | Age: 65
End: 2020-09-24
Payer: COMMERCIAL

## 2020-09-24 DIAGNOSIS — E11.9 TYPE 2 DIABETES MELLITUS WITHOUT COMPLICATION, WITHOUT LONG-TERM CURRENT USE OF INSULIN (HCC): ICD-10-CM

## 2020-09-24 DIAGNOSIS — E11.29 TYPE 2 DIABETES MELLITUS WITH MICROALBUMINURIA, WITHOUT LONG-TERM CURRENT USE OF INSULIN (HCC): ICD-10-CM

## 2020-09-24 DIAGNOSIS — R80.9 TYPE 2 DIABETES MELLITUS WITH MICROALBUMINURIA, WITHOUT LONG-TERM CURRENT USE OF INSULIN (HCC): ICD-10-CM

## 2020-09-24 LAB
ANION GAP SERPL CALCULATED.3IONS-SCNC: 10 MMOL/L (ref 4–13)
BUN SERPL-MCNC: 10 MG/DL (ref 5–25)
CALCIUM SERPL-MCNC: 9 MG/DL (ref 8.3–10.1)
CHLORIDE SERPL-SCNC: 100 MMOL/L (ref 100–108)
CO2 SERPL-SCNC: 28 MMOL/L (ref 21–32)
CREAT SERPL-MCNC: 0.89 MG/DL (ref 0.6–1.3)
GFR SERPL CREATININE-BSD FRML MDRD: 90 ML/MIN/1.73SQ M
GLUCOSE SERPL-MCNC: 161 MG/DL (ref 65–140)
POTASSIUM SERPL-SCNC: 3.8 MMOL/L (ref 3.5–5.3)
SODIUM SERPL-SCNC: 138 MMOL/L (ref 136–145)

## 2020-09-24 PROCEDURE — 36415 COLL VENOUS BLD VENIPUNCTURE: CPT

## 2020-09-24 PROCEDURE — 80048 BASIC METABOLIC PNL TOTAL CA: CPT

## 2020-09-24 RX ORDER — LISINOPRIL 5 MG/1
5 TABLET ORAL DAILY
Qty: 90 TABLET | Refills: 2 | Status: SHIPPED | OUTPATIENT
Start: 2020-09-24 | End: 2020-12-03

## 2020-09-29 ENCOUNTER — TELEPHONE (OUTPATIENT)
Dept: FAMILY MEDICINE CLINIC | Facility: CLINIC | Age: 65
End: 2020-09-29

## 2020-09-29 NOTE — TELEPHONE ENCOUNTER
PT calling asking about his empagliflozin, he said [facility] a hard time getting a refill on this medication, he said it was a trial medication however he never heard what to do after that and he can no longer take the metformin as he gets diarrhea and does not want to take that any longer, Kayla Umana can be reached at [phone]

## 2020-10-01 DIAGNOSIS — E11.9 TYPE 2 DIABETES MELLITUS WITHOUT COMPLICATION, WITHOUT LONG-TERM CURRENT USE OF INSULIN (HCC): ICD-10-CM

## 2020-10-01 NOTE — TELEPHONE ENCOUNTER
Called, left message  Jardiance was not discontinued, refill has been sent to [facility]  If patient has any questions please advised patient to set up telemedicine appointment so that we can discuss further, as patient has previously suffered from side effects of prior medications  Thank you

## 2020-10-09 ENCOUNTER — TELEPHONE (OUTPATIENT)
Dept: FAMILY MEDICINE CLINIC | Facility: CLINIC | Age: 65
End: 2020-10-09

## 2020-10-21 ENCOUNTER — TELEPHONE (OUTPATIENT)
Dept: FAMILY MEDICINE CLINIC | Facility: CLINIC | Age: 65
End: 2020-10-21

## 2020-10-21 RX ORDER — AMOXICILLIN 500 MG/1
CAPSULE ORAL
COMMUNITY
Start: 2020-09-01 | End: 2021-01-07 | Stop reason: ALTCHOICE

## 2020-12-02 DIAGNOSIS — R80.9 TYPE 2 DIABETES MELLITUS WITH MICROALBUMINURIA, WITHOUT LONG-TERM CURRENT USE OF INSULIN (HCC): ICD-10-CM

## 2020-12-02 DIAGNOSIS — E11.29 TYPE 2 DIABETES MELLITUS WITH MICROALBUMINURIA, WITHOUT LONG-TERM CURRENT USE OF INSULIN (HCC): ICD-10-CM

## 2020-12-03 RX ORDER — LISINOPRIL 5 MG/1
TABLET ORAL
Qty: 90 TABLET | Refills: 2 | Status: SHIPPED | OUTPATIENT
Start: 2020-12-03 | End: 2021-10-12

## 2020-12-09 DIAGNOSIS — E11.9 TYPE 2 DIABETES MELLITUS WITHOUT COMPLICATION, WITHOUT LONG-TERM CURRENT USE OF INSULIN (HCC): ICD-10-CM

## 2021-01-05 DIAGNOSIS — E11.9 TYPE 2 DIABETES MELLITUS WITHOUT COMPLICATION, WITHOUT LONG-TERM CURRENT USE OF INSULIN (HCC): ICD-10-CM

## 2021-01-05 RX ORDER — GLIPIZIDE 5 MG/1
TABLET, FILM COATED, EXTENDED RELEASE ORAL
Qty: 90 TABLET | Refills: 3 | Status: SHIPPED | OUTPATIENT
Start: 2021-01-05 | End: 2022-02-03 | Stop reason: SDUPTHER

## 2021-01-07 ENCOUNTER — TELEMEDICINE (OUTPATIENT)
Dept: FAMILY MEDICINE CLINIC | Facility: CLINIC | Age: 66
End: 2021-01-07

## 2021-01-07 DIAGNOSIS — E11.9 TYPE 2 DIABETES MELLITUS WITHOUT COMPLICATION, WITHOUT LONG-TERM CURRENT USE OF INSULIN (HCC): ICD-10-CM

## 2021-01-07 PROCEDURE — 99213 OFFICE O/P EST LOW 20 MIN: CPT | Performed by: FAMILY MEDICINE

## 2021-01-09 NOTE — PROGRESS NOTES
Virtual Brief Visit    Assessment/Plan:    Problem List Items Addressed This Visit        Endocrine    Type 2 diabetes mellitus without complication, without long-term current use of insulin ([facility] )       Lab Results   Component Value Date    HGBA1C 7 8 (A) 08/13/2020     - labs ordered; regimen to be reassessed s/p labs  - refill provided as requested         Relevant Medications    Empagliflozin 10 MG TABS    Other Relevant Orders    HEMOGLOBIN A1C W/ EAG ESTIMATION                Reason for visit is   Chief Complaint   Patient presents with    Virtual Brief Visit        Encounter provider Portillo Padron MD    Provider located at [address] [id number] [phone]    Recent Visits  Date Type Provider Dept   01/07/21 Vidhya Butt MD [facility] recent visits within past 7 days and meeting all other requirements     Future Appointments  No visits were found meeting these conditions  Showing future appointments within next 150 days and meeting all other requirements        After connecting through telephone, the patient was identified by name and date of birth  Alec Lucio was informed that this is a telemedicine visit and that the visit is being conducted through telephone  My office door was closed  No one else was in the room  He acknowledged consent and understanding of privacy and security of the platform  The patient has agreed to participate and understands he can discontinue the visit at any time  Patient is aware this is a billable service  Subjective    Alec Lucio is a 60 y o  male   HPI     Patient is calling today for med refill for DM regimen  No acute complaints or concerns  Patient reports compliance with current regimen  Tolerating meds well  Needs new A1c       Past Medical History:   Diagnosis Date    Candidal intertrigo 10/16/2015    Diabetes mellitus ([facility] )     Thyroid nodule 10/16/2015       No past surgical history on file  Current Outpatient Medications   Medication Sig Dispense Refill    atorvastatin (LIPITOR) 40 mg tablet TAKE 1 TABLET BY MOUTH EVERY DAY 90 tablet 3    Empagliflozin 10 MG TABS Take 1 tablet (10 mg total) by mouth every morning 90 tablet 3    glipiZIDE (GLUCOTROL XL) 5 mg 24 hr tablet TAKE 1 TABLET BY MOUTH EVERY DAY 90 tablet 3    lisinopril (ZESTRIL) 5 mg tablet TAKE 1 TABLET BY MOUTH EVERY DAY 90 tablet 2    metFORMIN (GLUCOPHAGE) 1000 MG tablet Take 1 tablet (1,000 mg total) by mouth every 12 (twelve) hours 30 tablet 0     No current facility-administered medications for this visit  Allergies   Allergen Reactions    Acetaminophen Nausea Only       Review of Systems   Constitutional: Negative for activity change, chills and fever  Gastrointestinal: Negative for diarrhea, nausea and vomiting  Neurological: Negative for dizziness, light-headedness and headaches  There were no vitals filed for this visit  I spent 5 minutes directly with the patient during this visit    VIRTUAL VISIT DISCLAIMER    Angela Brown acknowledges that he has consented to an online visit or consultation  He understands that the online visit is based solely on information provided by him, and that, in the absence of a face-to-face physical evaluation by the physician, the diagnosis he receives is both limited and provisional in terms of accuracy and completeness  This is not intended to replace a full medical face-to-face evaluation by the physician  Angela Brown understands and accepts these terms

## 2021-01-09 NOTE — ASSESSMENT & PLAN NOTE
Lab Results   Component Value Date    HGBA1C 7 8 (A) 08/13/2020     - labs ordered; regimen to be reassessed s/p labs  - refill provided as requested

## 2021-01-12 ENCOUNTER — TELEPHONE (OUTPATIENT)
Dept: FAMILY MEDICINE CLINIC | Facility: CLINIC | Age: 66
End: 2021-01-12

## 2021-04-28 ENCOUNTER — IMMUNIZATIONS (OUTPATIENT)
Dept: FAMILY MEDICINE CLINIC | Facility: HOSPITAL | Age: 66
End: 2021-04-28

## 2021-04-28 DIAGNOSIS — Z23 ENCOUNTER FOR IMMUNIZATION: Primary | ICD-10-CM

## 2021-04-28 PROCEDURE — 0001A SARS-COV-2 / COVID-19 MRNA VACCINE (PFIZER-BIONTECH) 30 MCG: CPT

## 2021-04-28 PROCEDURE — 91300 SARS-COV-2 / COVID-19 MRNA VACCINE (PFIZER-BIONTECH) 30 MCG: CPT

## 2021-05-19 ENCOUNTER — IMMUNIZATIONS (OUTPATIENT)
Dept: FAMILY MEDICINE CLINIC | Facility: HOSPITAL | Age: 66
End: 2021-05-19

## 2021-05-19 DIAGNOSIS — Z23 ENCOUNTER FOR IMMUNIZATION: Primary | ICD-10-CM

## 2021-05-19 PROCEDURE — 91300 SARS-COV-2 / COVID-19 MRNA VACCINE (PFIZER-BIONTECH) 30 MCG: CPT

## 2021-05-19 PROCEDURE — 0002A SARS-COV-2 / COVID-19 MRNA VACCINE (PFIZER-BIONTECH) 30 MCG: CPT

## 2021-07-16 DIAGNOSIS — E78.5 HYPERLIPIDEMIA, UNSPECIFIED HYPERLIPIDEMIA TYPE: ICD-10-CM

## 2021-07-16 RX ORDER — ATORVASTATIN CALCIUM 40 MG/1
TABLET, FILM COATED ORAL
Qty: 90 TABLET | Refills: 3 | Status: SHIPPED | OUTPATIENT
Start: 2021-07-16 | End: 2022-05-18 | Stop reason: SDUPTHER

## 2021-08-27 ENCOUNTER — TELEMEDICINE (OUTPATIENT)
Dept: FAMILY MEDICINE CLINIC | Facility: CLINIC | Age: 66
End: 2021-08-27

## 2021-08-27 DIAGNOSIS — Z20.822 EXPOSURE TO COVID-19 VIRUS: Primary | ICD-10-CM

## 2021-08-27 PROCEDURE — U0003 INFECTIOUS AGENT DETECTION BY NUCLEIC ACID (DNA OR RNA); SEVERE ACUTE RESPIRATORY SYNDROME CORONAVIRUS 2 (SARS-COV-2) (CORONAVIRUS DISEASE [COVID-19]), AMPLIFIED PROBE TECHNIQUE, MAKING USE OF HIGH THROUGHPUT TECHNOLOGIES AS DESCRIBED BY CMS-2020-01-R: HCPCS | Performed by: FAMILY MEDICINE

## 2021-08-27 PROCEDURE — G2012 BRIEF CHECK IN BY MD/QHP: HCPCS | Performed by: FAMILY MEDICINE

## 2021-08-27 PROCEDURE — U0005 INFEC AGEN DETEC AMPLI PROBE: HCPCS | Performed by: FAMILY MEDICINE

## 2021-08-27 NOTE — PROGRESS NOTES
COVID-19 Outpatient Progress Note    Assessment/Plan:    Problem List Items Addressed This Visit        Other    Exposure to COVID-19 virus - Primary     Patient's mom's caregiver tested positive 8/25/21  Caregiver was last at the home on 8/23/21  Currently asymptomatic  Due to hx of Type 2DM and age will send for COVID testing  ED precautions discussed         Relevant Orders    Novel Coronavirus (Covid-19),PCR [facility] or [facility]         Disposition:     I referred patient to one of our centralized sites for a COVID-19 swab  I have spent 20 minutes directly with the patient  Greater than 50% of this time was spent in counseling/coordination of care regarding: risks and benefits of treatment options and patient and family education  Verification of patient location:    Patient is located in the following state in which I hold an active license [place]    Encounter provider Anthony Matos DO    Provider located at [address] [place] [address]   [phone]    Recent Visits  No visits were found meeting these conditions  Showing recent visits within past 7 days and meeting all other requirements  Today's Visits  Date Type Provider Dept   08/27/21 Telemedicine Anthony Matos, [facility] today's visits and meeting all other requirements  Future Appointments  No visits were found meeting these conditions  Showing future appointments within next 150 days and meeting all other requirements     This virtual check-in was done via telephone and he agrees to proceed  Patient agrees to participate in a virtual check in via telephone or video visit instead of presenting to the office to address urgent/immediate medical needs  Patient is aware this is a billable service  After connecting through Telephone, the patient was identified by name and date of birth   Susana Espinal was informed that this was a telemedicine visit and that the exam was being conducted confidentially over secure lines  My office door was closed  No one else was in the room  Mike Muse acknowledged consent and understanding of privacy and security of the telemedicine visit  I informed the patient that I have reviewed his record in Epic and presented the opportunity for him to ask any questions regarding the visit today  The patient agreed to participate  It was my intent to perform this visit via video technology but the patient was not able to do a video connection so the visit was completed via audio telephone only  Subjective: Mike Muse is a 73 y o  male who is concerned about COVID-19  Patient denies fever, chills, fatigue, malaise, congestion, rhinorrhea, sore throat, anosmia, loss of taste, cough, shortness of breath, chest tightness, abdominal pain, nausea, vomiting, diarrhea, myalgias and headaches  Date of exposure: 8/23/2021  COVID-19 vaccination status: Fully vaccinated    Exposure:   Contact with a person who is under investigation (PUI) for or who is positive for COVID-19 within the last 14 days?: Yes    Hospitalized recently for fever and/or lower respiratory symptoms?: No      Currently a healthcare worker that is involved in direct patient care?: No      Works in a special setting where the risk of COVID-19 transmission may be high? (this may include long-term care, correctional and snf facilities; homeless shelters; assisted-living facilities and group homes ): No      Resident in a special setting where the risk of COVID-19 transmission may be high? (this may include long-term care, correctional and snf facilities; homeless shelters; assisted-living facilities and group homes ): No      Caregiver for patient's mom tested positive on 8/25 after she developed symptoms starting 8/20  Patient is currently asymptomatic at this time       No results found for: So Hernandez, [facility], Frankie Pratt  Past Medical History:   Diagnosis Date    Candidal intertrigo 10/16/2015    [facility]     Thyroid nodule 10/16/2015     No past surgical history on file  Current Outpatient Medications   Medication Sig Dispense Refill    atorvastatin (LIPITOR) 40 mg tablet TAKE 1 TABLET BY MOUTH EVERY DAY 90 tablet 3    Empagliflozin 10 MG TABS Take 1 tablet (10 mg total) by mouth every morning 90 tablet 3    glipiZIDE (GLUCOTROL XL) 5 mg 24 hr tablet TAKE 1 TABLET BY MOUTH EVERY DAY 90 tablet 3    lisinopril (ZESTRIL) 5 mg tablet TAKE 1 TABLET BY MOUTH EVERY DAY 90 tablet 2    metFORMIN (GLUCOPHAGE) 1000 MG tablet Take 1 tablet (1,000 mg total) by mouth every 12 (twelve) hours 30 tablet 0     No current facility-administered medications for this visit  Allergies   Allergen Reactions    Acetaminophen Nausea Only       Review of Systems   Constitutional: Negative for chills, fatigue and fever  HENT: Negative for congestion, rhinorrhea and sore throat  Respiratory: Negative for cough, chest tightness and shortness of breath  Gastrointestinal: Negative for abdominal pain, diarrhea, nausea and vomiting  Musculoskeletal: Negative for myalgias  Neurological: Negative for headaches  Objective: There were no vitals filed for this visit  Physical Exam    VIRTUAL VISIT DISCLAIMER    Aniceto Obinna verbally agrees to participate in [employer]  Pt is aware that [employer] could be limited without vital signs or the ability to perform a full hands-on physical exam  Jovani Avila understands he or the provider may request at any time to terminate the video visit and request the patient to seek care or treatment in person

## 2021-08-27 NOTE — ASSESSMENT & PLAN NOTE
Patient's mom's caregiver tested positive 8/25/21  Caregiver was last at the home on 8/23/21  Currently asymptomatic  Due to hx of Type 2DM and age will send for COVID testing  ED precautions discussed

## 2021-08-30 ENCOUNTER — TELEMEDICINE (OUTPATIENT)
Dept: FAMILY MEDICINE CLINIC | Facility: CLINIC | Age: 66
End: 2021-08-30

## 2021-08-30 DIAGNOSIS — Z20.822 EXPOSURE TO COVID-19 VIRUS: Primary | ICD-10-CM

## 2021-08-30 PROCEDURE — 99212 OFFICE O/P EST SF 10 MIN: CPT | Performed by: FAMILY MEDICINE

## 2021-08-30 NOTE — PROGRESS NOTES
Virtual Brief Visit    Verification of patient location:    Patient is located in the following state in which I hold an active license [place]      Assessment/Plan:    Problem List Items Addressed This Visit        Other    Exposure to COVID-19 virus - Primary     Exposure date: 8/23/21  COVID tested normal on 8/27/21  Asymptomatic, only scratchy voice, denies sore throat  Does not need to be in quarantine with negative test result and no symptoms  Advised to call if any symptoms come on  Recommended to follow up for annual physical exam          Reason for visit is   Chief Complaint   Patient presents with    COVID-19    Virtual Brief Visit        Encounter provider Yordy Nagel MD    Provider located at [address]   [phone]    Recent Visits  Date Type Provider Dept   08/27/21 [address] [place] [address], [address] [place] [address] recent visits within past 7 days and meeting all other requirements  Today's Visits  Date Type Provider Dept   08/30/21 Telemedicine Yordy Nagel MD [facility]   Showing today's visits and meeting all other requirements  Future Appointments  No visits were found meeting these conditions  Showing future appointments within next 150 days and meeting all other requirements       After connecting through [employer] and patient was informed that this is a secure, HIPAA-compliant platform  He agrees to proceed  , the patient was identified by name and date of birth  Patrica Marquez was informed that this is a telemedicine visit and that the visit is being conducted through telephone  My office door was closed  No one else was in the room  He acknowledged consent and understanding of privacy and security of the platform  The patient has agreed to participate and understands he can discontinue the visit at any time  Patient is aware this is a billable service  Subjective    Mike Muse is a 73 y o  male with recent exposure to person with COVID positive on 8/23/21  Patient was tested negative on 8/27/21  Current asymptomatic  Past Medical History:   Diagnosis Date    Candidal intertrigo 10/16/2015    Diabetes mellitus ([facility] [address] )     Thyroid nodule 10/16/2015       No past surgical history on file  Current Outpatient Medications   Medication Sig Dispense Refill    atorvastatin (LIPITOR) 40 mg tablet TAKE 1 TABLET BY MOUTH EVERY DAY 90 tablet 3    Empagliflozin 10 MG TABS Take 1 tablet (10 mg total) by mouth every morning 90 tablet 3    glipiZIDE (GLUCOTROL XL) 5 mg 24 hr tablet TAKE 1 TABLET BY MOUTH EVERY DAY 90 tablet 3    lisinopril (ZESTRIL) 5 mg tablet TAKE 1 TABLET BY MOUTH EVERY DAY 90 tablet 2    metFORMIN (GLUCOPHAGE) 1000 MG tablet Take 1 tablet (1,000 mg total) by mouth every 12 (twelve) hours 30 tablet 0     No current facility-administered medications for this visit  Allergies   Allergen Reactions    Acetaminophen Nausea Only       Review of Systems   Constitutional: Negative for activity change, appetite change, chills, fatigue and fever  HENT: Negative for sinus pressure, sinus pain, sneezing, sore throat and trouble swallowing  Eyes: Negative for visual disturbance  Respiratory: Negative for cough, chest tightness, shortness of breath and wheezing  Cardiovascular: Negative for chest pain  Gastrointestinal: Negative for abdominal pain, diarrhea, nausea and vomiting  Musculoskeletal: Negative for myalgias  Skin: Negative for rash  Neurological: Negative for dizziness, light-headedness and headaches  Psychiatric/Behavioral: Negative for confusion  The patient is not nervous/anxious  There were no vitals filed for this visit  I spent 15 minutes directly with the patient during this visit    [address] [place] [address] verbally agrees to participate in [employer]   Pt is aware that [employer] could be limited without vital signs or the ability to perform a full hands-on physical exam  Jovani Padron understands he or the provider may request at any time to terminate the video visit and request the patient to seek care or treatment in person        Gerardo Billings MD  Family Medicine, PGY-2  7:13 PM 8/30/2021

## 2021-08-30 NOTE — PROGRESS NOTES
COVID-19 Outpatient Progress Note    Assessment/Plan:    Problem List Items Addressed This Visit     None         COVID-19 Plan     Verification of patient location:    Patient is located in the following state in which I hold an active license {[facility] virtual patient location:[id number]}    Encounter provider Maddy Meneses MD    Provider located at [address] [place] [address] [facility]   [phone]    Recent Visits  Date Type Provider Dept   08/27/21 [address] [place] [address], [address] [place] [address] recent visits within past 7 days and meeting all other requirements  Today's Visits  Date Type Provider Dept   08/30/21 Telemedicine Maddy Meneses MD  [facility]   Showing today's visits and meeting all other requirements  Future Appointments  No visits were found meeting these conditions  Showing future appointments within next 150 days and meeting all other requirements     COVID-19 HPI  Lab Results   Component Value Date    SARSCOV2 Negative 08/27/2021     Past Medical History:   Diagnosis Date    Candidal intertrigo 10/16/2015    Diabetes mellitus ([facility] [address] )     Thyroid nodule 10/16/2015     No past surgical history on file  Current Outpatient Medications   Medication Sig Dispense Refill    atorvastatin (LIPITOR) 40 mg tablet TAKE 1 TABLET BY MOUTH EVERY DAY 90 tablet 3    Empagliflozin 10 MG TABS Take 1 tablet (10 mg total) by mouth every morning 90 tablet 3    glipiZIDE (GLUCOTROL XL) 5 mg 24 hr tablet TAKE 1 TABLET BY MOUTH EVERY DAY 90 tablet 3    lisinopril (ZESTRIL) 5 mg tablet TAKE 1 TABLET BY MOUTH EVERY DAY 90 tablet 2    metFORMIN (GLUCOPHAGE) 1000 MG tablet Take 1 tablet (1,000 mg total) by mouth every 12 (twelve) hours 30 tablet 0     No current facility-administered medications for this visit  Allergies   Allergen Reactions    Acetaminophen Nausea Only       Review of Systems    Objective:     There were no vitals filed for this visit  Physical Exam    VIRTUAL VISIT DISCLAIMER    Johnathan Alfaro verbally agrees to participate in [employer]  Pt is aware that [employer] could be limited without vital signs or the ability to perform a full hands-on physical exam  Jovani Wong understands he or the provider may request at any time to terminate the video visit and request the patient to seek care or treatment in person

## 2021-10-01 ENCOUNTER — TELEPHONE (OUTPATIENT)
Dept: FAMILY MEDICINE CLINIC | Facility: CLINIC | Age: 66
End: 2021-10-01

## 2021-10-12 DIAGNOSIS — R80.9 TYPE 2 DIABETES MELLITUS WITH MICROALBUMINURIA, WITHOUT LONG-TERM CURRENT USE OF INSULIN (HCC): ICD-10-CM

## 2021-10-12 DIAGNOSIS — E11.29 TYPE 2 DIABETES MELLITUS WITH MICROALBUMINURIA, WITHOUT LONG-TERM CURRENT USE OF INSULIN (HCC): ICD-10-CM

## 2021-10-12 RX ORDER — LISINOPRIL 5 MG/1
TABLET ORAL
Qty: 90 TABLET | Refills: 2 | Status: SHIPPED | OUTPATIENT
Start: 2021-10-12 | End: 2021-12-13 | Stop reason: SDUPTHER

## 2021-11-17 DIAGNOSIS — Z13.31 SCREENING FOR DEPRESSION: Primary | ICD-10-CM

## 2021-11-18 ENCOUNTER — VBI (OUTPATIENT)
Dept: ADMINISTRATIVE | Facility: OTHER | Age: 66
End: 2021-11-18

## 2021-12-13 ENCOUNTER — TELEMEDICINE (OUTPATIENT)
Dept: FAMILY MEDICINE CLINIC | Facility: CLINIC | Age: 66
End: 2021-12-13

## 2021-12-13 DIAGNOSIS — E11.29 TYPE 2 DIABETES MELLITUS WITH MICROALBUMINURIA, WITHOUT LONG-TERM CURRENT USE OF INSULIN (HCC): ICD-10-CM

## 2021-12-13 DIAGNOSIS — R80.9 TYPE 2 DIABETES MELLITUS WITH MICROALBUMINURIA, WITHOUT LONG-TERM CURRENT USE OF INSULIN (HCC): ICD-10-CM

## 2021-12-13 DIAGNOSIS — Z20.822 EXPOSURE TO COVID-19 VIRUS: Primary | ICD-10-CM

## 2021-12-13 PROCEDURE — 99213 OFFICE O/P EST LOW 20 MIN: CPT | Performed by: FAMILY MEDICINE

## 2021-12-13 RX ORDER — LISINOPRIL 5 MG/1
5 TABLET ORAL DAILY
Qty: 90 TABLET | Refills: 2 | Status: SHIPPED | OUTPATIENT
Start: 2021-12-13

## 2021-12-16 PROCEDURE — U0003 INFECTIOUS AGENT DETECTION BY NUCLEIC ACID (DNA OR RNA); SEVERE ACUTE RESPIRATORY SYNDROME CORONAVIRUS 2 (SARS-COV-2) (CORONAVIRUS DISEASE [COVID-19]), AMPLIFIED PROBE TECHNIQUE, MAKING USE OF HIGH THROUGHPUT TECHNOLOGIES AS DESCRIBED BY CMS-2020-01-R: HCPCS | Performed by: FAMILY MEDICINE

## 2021-12-16 PROCEDURE — U0005 INFEC AGEN DETEC AMPLI PROBE: HCPCS | Performed by: FAMILY MEDICINE

## 2022-01-17 ENCOUNTER — OFFICE VISIT (OUTPATIENT)
Dept: FAMILY MEDICINE CLINIC | Facility: CLINIC | Age: 67
End: 2022-01-17

## 2022-01-17 VITALS
OXYGEN SATURATION: 99 % | HEIGHT: 66 IN | BODY MASS INDEX: 25.23 KG/M2 | TEMPERATURE: 97.8 F | RESPIRATION RATE: 18 BRPM | WEIGHT: 157 LBS | DIASTOLIC BLOOD PRESSURE: 74 MMHG | SYSTOLIC BLOOD PRESSURE: 118 MMHG | HEART RATE: 106 BPM

## 2022-01-17 DIAGNOSIS — Z13.6 SCREENING FOR AAA (ABDOMINAL AORTIC ANEURYSM): ICD-10-CM

## 2022-01-17 DIAGNOSIS — Z13.820 OSTEOPOROSIS SCREENING: ICD-10-CM

## 2022-01-17 DIAGNOSIS — Z11.4 SCREENING FOR HIV (HUMAN IMMUNODEFICIENCY VIRUS): ICD-10-CM

## 2022-01-17 DIAGNOSIS — E78.2 MIXED HYPERLIPIDEMIA: ICD-10-CM

## 2022-01-17 DIAGNOSIS — Z00.00 ENCOUNTER FOR MEDICARE ANNUAL WELLNESS EXAM: Primary | ICD-10-CM

## 2022-01-17 DIAGNOSIS — Z12.11 ENCOUNTER FOR SCREENING COLONOSCOPY: ICD-10-CM

## 2022-01-17 DIAGNOSIS — E11.29 TYPE 2 DIABETES MELLITUS WITH MICROALBUMINURIA, WITHOUT LONG-TERM CURRENT USE OF INSULIN (HCC): ICD-10-CM

## 2022-01-17 DIAGNOSIS — R80.9 TYPE 2 DIABETES MELLITUS WITH MICROALBUMINURIA, WITHOUT LONG-TERM CURRENT USE OF INSULIN (HCC): ICD-10-CM

## 2022-01-17 DIAGNOSIS — Z13.31 SCREENING FOR DEPRESSION: ICD-10-CM

## 2022-01-17 DIAGNOSIS — Z12.5 SCREENING FOR PROSTATE CANCER: ICD-10-CM

## 2022-01-17 LAB — SL AMB POCT HEMOGLOBIN AIC: 7.4 (ref ?–6.5)

## 2022-01-17 PROCEDURE — 3008F BODY MASS INDEX DOCD: CPT | Performed by: FAMILY MEDICINE

## 2022-01-17 PROCEDURE — G0438 PPPS, INITIAL VISIT: HCPCS | Performed by: FAMILY MEDICINE

## 2022-01-17 PROCEDURE — 1101F PT FALLS ASSESS-DOCD LE1/YR: CPT | Performed by: FAMILY MEDICINE

## 2022-01-17 PROCEDURE — 99213 OFFICE O/P EST LOW 20 MIN: CPT | Performed by: FAMILY MEDICINE

## 2022-01-17 PROCEDURE — 1125F AMNT PAIN NOTED PAIN PRSNT: CPT | Performed by: FAMILY MEDICINE

## 2022-01-17 PROCEDURE — 1170F FXNL STATUS ASSESSED: CPT | Performed by: FAMILY MEDICINE

## 2022-01-17 PROCEDURE — 3066F NEPHROPATHY DOC TX: CPT | Performed by: FAMILY MEDICINE

## 2022-01-17 PROCEDURE — 3288F FALL RISK ASSESSMENT DOCD: CPT | Performed by: FAMILY MEDICINE

## 2022-01-17 PROCEDURE — 83036 HEMOGLOBIN GLYCOSYLATED A1C: CPT | Performed by: FAMILY MEDICINE

## 2022-01-17 PROCEDURE — 3725F SCREEN DEPRESSION PERFORMED: CPT | Performed by: FAMILY MEDICINE

## 2022-01-17 PROCEDURE — 3051F HG A1C>EQUAL 7.0%<8.0%: CPT | Performed by: FAMILY MEDICINE

## 2022-01-17 RX ORDER — AZELASTINE HYDROCHLORIDE 0.5 MG/ML
SOLUTION/ DROPS OPHTHALMIC
COMMUNITY
Start: 2021-12-13

## 2022-01-17 NOTE — ASSESSMENT & PLAN NOTE
Stable on Atorvastatin 40mg qHS  - Currently asympt  - ASCVD risk score 16 1  - C/w current regimen for now  -  on lifestyle improvement including diet & exercise  - Will recheck lipid panel    Lab Results   Component Value Date/Time    CHOLESTEROL 136 08/19/2019 09:10 AM    TRIG 205 (H) 08/19/2019 09:10 AM    TRIG 323 (H) 10/12/2017 09:42 AM    HDL 48 08/19/2019 09:10 AM    HDL 39 (L) 10/12/2017 09:42 AM    LDLCALC 47 08/19/2019 09:10 AM

## 2022-01-17 NOTE — PROGRESS NOTES
Ana Maria Del [address] [age] y o  male MRN: [id number]  Encounter: [id number],  Primary Care Provider: Gopal Pinedo MD      Date: 01/17/22    Assessments & Plans:     Problem List Items Addressed This Visit        Endocrine    Type 2 diabetes mellitus with microalbuminuria, without long-term current use of insulin ([address] )     HgbA1c above goal on Empagliflozin 10mg qAM, Glipizide 1mg qD and Lisinopril 5mg qD for proteinuria  - Currently asympt and has been compliant with meds  - GI side-effect with Metformin  - Goal HgbA1c < 7  - C/w current regimen for now  -  on lifestyle improvement including diet & exercise  - Rec daily home BG check and bring log to next visit  - RTC in 3 months for HgbA1c recheck    Lab Results   Component Value Date    HGBA1C 7 4 (A) 01/17/2022    HGBA1C 7 8 (A) 08/13/2020    HGBA1C 6 0 12/06/2019            Relevant Orders    POCT hemoglobin A1c (Completed)    Basic metabolic panel    Microalbumin / creatinine urine ratio       Other    Hyperlipidemia     Stable on Atorvastatin 40mg qHS  - Currently asympt  - ASCVD risk score 16 1  - C/w current regimen for now  -  on lifestyle improvement including diet & exercise  - Will recheck lipid panel    Lab Results   Component Value Date/Time    CHOLESTEROL 136 08/19/2019 09:10 AM    TRIG 205 (H) 08/19/2019 09:10 AM    TRIG 323 (H) 10/12/2017 09:42 AM    HDL 48 08/19/2019 09:10 AM    HDL 39 (L) 10/12/2017 09:42 AM    LDLCALC 47 08/19/2019 09:10 AM            Relevant Orders    Lipid Panel with Direct LDL reflex    Screening for depression     Stable mood without h/o major depression  - PHQ-9 screening score 6 on 1/17/22  - States intermittently depressed after watching the news and never had a committed relationship  - Currently living with brother and mother started long-term care facility since November 2021 due to inability to take care at home  - Denies any SH/SI at this time           Other Visit Diagnoses     Encounter for Medicare annual wellness exam    -  Primary    Screening for HIV (human immunodeficiency virus)        Relevant Orders    HIV 1/2 Antigen/Antibody (4th Generation) w Reflex [facility]    Encounter for screening colonoscopy        Relevant Orders    Ambulatory Referral to General Surgery    Screening for prostate cancer        Relevant Orders    PSA, Total Screen    Osteoporosis screening        Relevant Orders    DXA bone density spine hip and pelvis    Screening for AAA (abdominal aortic aneurysm)        Relevant Orders    US abdominal aorta screening aaa          Follow-up: Return in about 3 months (around 4/17/2022) for HgbA1c recheck  Patient Active Problem List   Diagnosis    Type 2 diabetes mellitus with microalbuminuria, without long-term current use of insulin ([facility] [address] )    Hyperlipidemia    History of thyroid nodule    History of herniated intervertebral disc    Exposure to COVID-19 virus    Screening for depression         Recent Visits:     Recent Visits  No visits were found meeting these conditions  Showing recent visits within past 7 days and meeting all other requirements  Today's Visits  Date Type Provider Dept   01/17/22 Office Visit Lynn Wilson MD [facility] today's visits and meeting all other requirements  Future Appointments  No visits were found meeting these conditions  Showing future appointments within next 150 days and meeting all other requirements      Subjective:     Chief Complaint   Patient presents with    Medicare Wellness Visit       HPI:  73 y o  male presents for [facility]  Patient states he's at baseline state of health without any questions or concerns at this time        Review of System:   Pt denies any fever, chill, excessive fatigue, unexpected weight-loss, headache, dizziness, blurry vision, rhinorrhea/epistasis nausea/vomiting, cough, hoarse voice, chest-pain, shortness of breath, exertional dyspnea, abdominal pain, dysuria, diarrhea, or new extremity weakness/paraesthesia  A 12-point, complete review of systems was reviewed and negative except as stated above   History:     Past Medical History:   Diagnosis Date    Candidal intertrigo 10/16/2015    Diabetes mellitus ([facility] [address] )     Thyroid nodule 10/16/2015     History reviewed  No pertinent surgical history  Social History   Social History     Substance and Sexual Activity   Alcohol Use Yes    Alcohol/week: 1 0 standard drink    Types: 1 Cans of beer per week     Social History     Substance and Sexual Activity   Drug Use No     Social History     Tobacco Use   Smoking Status Never Smoker   Smokeless Tobacco Never Used       Medications & Allergies: Allergies   Allergen Reactions    Acetaminophen Nausea Only       PTA Medications:  Current Outpatient Medications on File Prior to Visit   Medication Sig Dispense Refill    atorvastatin (LIPITOR) 40 mg tablet TAKE 1 TABLET BY MOUTH EVERY DAY 90 tablet 3    azelastine (OPTIVAR) 0 05 % ophthalmic solution       Empagliflozin 10 MG TABS Take 1 tablet (10 mg total) by mouth every morning 90 tablet 3    glipiZIDE (GLUCOTROL XL) 5 mg 24 hr tablet TAKE 1 TABLET BY MOUTH EVERY DAY 90 tablet 3    lisinopril (ZESTRIL) 5 mg tablet Take 1 tablet (5 mg total) by mouth daily 90 tablet 2    [DISCONTINUED] metFORMIN (GLUCOPHAGE) 1000 MG tablet Take 1 tablet (1,000 mg total) by mouth every 12 (twelve) hours 30 tablet 0     No current facility-administered medications on file prior to visit  Objective & Vitals:   Vitals: /74 (BP Location: Left arm, Patient Position: Sitting, Cuff Size: Standard)   Pulse (!) 106   Temp 97 8 °F (36 6 °C) (Temporal)   Resp 18   Ht 5' 6" (1 676 m)   Wt 71 2 kg (157 lb)   SpO2 99%   BMI 25 34 kg/m²       Labs, Imagings, and other studies:   I have personally reviewed pertinent reports      Recent Results (from the past 24 hour(s))   POCT hemoglobin A1c    Collection Time: 01/17/22  1:23 PM   Result Value Ref Range    Hemoglobin A1C 7 4 (A) 6 5     CT renal stone study abdomen pelvis without contrast    Result Date: 11/28/2019  Impression: 2 to 3 mm distal right ureteral obstructing calculus with mild upstream hydronephrosis and hydroureter  Multiple left renal cysts of variable attenuation  Sonography could be utilized for additional characterization is some of these appear to be simple while others appear hemorrhagic and the largest in the upper pole appears to be indeterminate  Severe lower lumbar degenerative discogenic disease and spinal stenosis at L3-L4 and L4-L5  Cholelithiasis without cholecystitis  Workstation performed: [id number]         Physical Exam:     Physical Exam:    General: Pt is sitting comfortably on exam table, NAD  Not ill-appearing  Psych: Able to converse appropriately  No mood disorder  Neuro: Intact facial expression/sensation  Intact shoulder shrug  No-focal neurological deficit  Head: Normocephalic  No hematoma  Eyes: Open spontaneously  EOM intact  KATH  Conjunctiva non-injected  No scleral icterus  No discharge  Visual acuity grossly intact  Ear: Nml external ear  No visible drainage at external auditory orifice  Nose: Clear  No nasal drainage  Throat: Clear  No hoarse voice  No cough  Grossly intact swallowing  Neck: Supple  Nml ROM  Heart: Regular rate/rhythm  No murmur/distant heart sound  Lungs: Clear to auscultation bilaterally  Nml respiratory effort  No respiratory distress/accessory muscle use  Abdomen: Soft  Non-tender  Non-distended  Bowel sound present  Extremities: +5/5 strengths on all 4 extremities  Strong radial/pedal pulses  No paresthesia  No LE edema  Future Labs & Appointments:     FUTURE LABS:  Lab Frequency Next Occurrence   DXA bone density spine hip and pelvis Once 01/17/2022   US abdominal aorta screening aaa Once 01/17/2022       FUTURE CONFIRMED APPOINTMENTS:  No future appointments      Love Martin MD  PGY-2, Family Medicine  01/17/22  2:12 PM    Dear reader, please be aware that portions of my note contain dictated text  I have done my best to proof-read this note prior to signing  However, there may be occasional unnoticed errors pertaining to "sound-alike" words and/or grammar during my dictation process  If there is any words or information that is unclear or appears erroneous, please kindly let me know and I will clarify and/or addend my notes accordingly  Thank you for your understanding

## 2022-01-17 NOTE — ASSESSMENT & PLAN NOTE
Stable mood without h/o major depression  - PHQ-9 screening score 6 on 1/17/22  - States intermittently depressed after watching the news and never had a committed relationship  - Currently living with brother and mother started long-term care facility since November 2021 due to inability to take care at home  - Denies any SH/SI at this time

## 2022-01-17 NOTE — PROGRESS NOTES
Medicare Annual [address]  73 y o  (:1955) male MRN: [id number]  Unit/Bed#:  Encounter: [id number]       Assessment and Plan:     Problem List Items Addressed This Visit    Other Visit Diagnoses     Encounter for Medicare annual wellness exam    -  Primary    Screening for HIV (human immunodeficiency virus)        Relevant Orders    HIV 1/2 Antigen/Antibody (4th Generation) w Reflex [facility]    Encounter for screening colonoscopy        Relevant Orders    Ambulatory Referral to General Surgery    Screening for prostate cancer        Relevant Orders    PSA, Total Screen    Osteoporosis screening        Relevant Orders    DXA bone density spine hip and pelvis    Screening for AAA (abdominal aortic aneurysm)        Relevant Orders    US abdominal aorta screening aaa        BMI Counseling: Body mass index is 25 34 kg/m²  The BMI is above normal  Nutrition recommendations include decreasing portion sizes, encouraging healthy choices of fruits and vegetables, decreasing fast food intake, consuming healthier snacks, limiting drinks that contain sugar, moderation in carbohydrate intake, increasing intake of lean protein, reducing intake of saturated and trans fat and reducing intake of cholesterol  Exercise recommendations include moderate physical activity 150 minutes/week, vigorous physical activity 75 minutes/week, exercising 3-5 times per week, obtaining a gym membership and strength training exercises  No pharmacotherapy was ordered  Rationale for BMI follow-up plan is due to patient being overweight or obese  Depression Screening and Follow-up Plan: Patient was screened for depression during today's encounter  They screened negative with a PHQ-2 score of 2  Preventive health issues were discussed with patient, and age appropriate screening tests were ordered as noted in patient's After Visit Summary    Personalized health advice and appropriate referrals for health education or preventive services given if needed, as noted in patient's After Visit Summary  History of Present Illness:     Patient presents for Medicare Annual Wellness visit    Patient Care Team:  Musa Mackenzie MD as PCP - General (Family Medicine)  Lori Razo MD as PCP - [address] [place] [address] (RTE)     Problem List:     Patient Active Problem List   Diagnosis    Type 2 diabetes mellitus with microalbuminuria, without long-term current use of insulin ([facility] [address] )    Hyperlipidemia    History of thyroid nodule    History of herniated intervertebral disc    Exposure to COVID-19 virus    Screening for depression      Past Medical and Surgical History:     Past Medical History:   Diagnosis Date    Candidal intertrigo 10/16/2015    Diabetes mellitus ([facility] [address] )     Thyroid nodule 10/16/2015     History reviewed  No pertinent surgical history  Family History:     History reviewed  No pertinent family history  Social History:     Social History     Socioeconomic History    Marital status: Single     Spouse name: None    Number of children: None    Years of education: None    Highest education level: None   Occupational History    None   Tobacco Use    Smoking status: Never Smoker    Smokeless tobacco: Never Used   Vaping Use    Vaping Use: Never used   Substance and Sexual Activity    Alcohol use:  Yes     Alcohol/week: 1 0 standard drink     Types: 1 Cans of beer per week    Drug use: No    Sexual activity: None   Other Topics Concern    None   Social History Narrative    None     Social Determinants of Health     Financial Resource Strain: Not on file   Food Insecurity: Not on file   Transportation Needs: Not on file   Physical Activity: Not on file   Stress: Not on file   Social Connections: Not on file   Intimate Partner Violence: Not on file   Housing Stability: Not on file      Medications and Allergies:     Current Outpatient Medications   Medication Sig Dispense Refill    atorvastatin (LIPITOR) 40 mg tablet TAKE 1 TABLET BY MOUTH EVERY DAY 90 tablet 3    azelastine (OPTIVAR) 0 05 % ophthalmic solution       Empagliflozin 10 MG TABS Take 1 tablet (10 mg total) by mouth every morning 90 tablet 3    glipiZIDE (GLUCOTROL XL) 5 mg 24 hr tablet TAKE 1 TABLET BY MOUTH EVERY DAY 90 tablet 3    lisinopril (ZESTRIL) 5 mg tablet Take 1 tablet (5 mg total) by mouth daily 90 tablet 2     No current facility-administered medications for this visit  Allergies   Allergen Reactions    Acetaminophen Nausea Only      Immunizations:     Immunization History   Administered Date(s) Administered    COVID-19 PFIZER VACCINE 0 3 ML IM 04/28/2021, 05/19/2021    Influenza, injectable, quadrivalent, preservative free 0 5 mL 10/19/2020      Health Maintenance:         Topic Date Due    HIV Screening  Never done    Colorectal Cancer Screening  Never done    Hepatitis C Screening  Completed         Topic Date Due    Pneumococcal Vaccine: 65+ Years (1 of 2 - PPSV23) Never done    DTaP,Tdap,and Td Vaccines (1 - Tdap) Never done    Influenza Vaccine (1) 09/01/2021    COVID-19 Vaccine (3 - Booster for [employer] series) 11/19/2021      Medicare Health Risk Assessment:     /74 (BP Location: Left arm, Patient Position: Sitting, Cuff Size: Standard)   Pulse (!) 106   Temp 97 8 °F (36 6 °C) (Temporal)   Resp 18   Ht 5' 6" (1 676 m)   Wt 71 2 kg (157 lb)   SpO2 99%   BMI 25 34 kg/m²      [facility] is here for his Subsequent Wellness visit  Last Medicare Wellness visit information reviewed, patient interviewed, no change since last AWV  Health Risk Assessment:   Patient rates overall health as good  Patient feels that their physical health rating is same  Patient is dissatisfied with their life  Eyesight was rated as same  Hearing was rated as same  Patient feels that their emotional and mental health rating is same  Patients states they are sometimes angry   Patient states they are sometimes unusually tired/fatigued  Pain experienced in the last 7 days has been some  Patient's pain rating has been 7/10  Patient states that he has experienced weight loss or gain in last 6 months  Depression Screening:   PHQ-2 Score: 2  PHQ-9 Score: 6      Fall Risk Screening: In the past year, patient has experienced: no history of falling in past year      Home Safety:  Patient has trouble with stairs inside or outside of their home  Patient has working smoke alarms and has no working carbon monoxide detector  Home safety hazards include: none  Nutrition:   Current diet is Regular  don't watch how much sweets I eat    Medications:   Patient is currently taking over-the-counter supplements  OTC medications include: occasional -  multi vitamin, male enhancement,  vitamin E,D  Patient is able to manage medications  Activities of Daily Living (ADLs)/Instrumental Activities of Daily Living (IADLs):   Walk and transfer into and out of bed and chair?: Yes  Dress and groom yourself?: Yes    Bathe or shower yourself?: Yes    Feed yourself?  Yes  Do your laundry/housekeeping?: Yes  Manage your money, pay your bills and track your expenses?: Yes  Make your own meals?: Yes    Do your own shopping?: Yes    Previous Hospitalizations:   Any hospitalizations or ED visits within the last 12 months?: No      Advance Care Planning:   Living will: Yes    Durable POA for healthcare: No    Advanced directive: Yes      PREVENTIVE SCREENINGS      Cardiovascular Screening:    General: Screening Not Indicated and History Lipid Disorder      Diabetes Screening:     General: Screening Not Indicated and History Diabetes      Colorectal Cancer Screening:     General: Risks and Benefits Discussed    Due for: Colonoscopy - Low Risk      Prostate Cancer Screening:    General: Risks and Benefits Discussed    Due for: PSA      Osteoporosis Screening:    General: Risks and Benefits Discussed    Due for: DXA Axial      Abdominal Aortic Aneurysm (AAA) Screening:    Risk factors include: age between 73-69 yo and tobacco use        General: Risks and Benefits Discussed    Due for: Screening AAA Ultrasound      Lung Cancer Screening:     General: Screening Not Indicated      Hepatitis C Screening:    General: Screening Current    Screening, Brief Intervention, and Referral to Treatment (SBIRT)    Screening  Typical number of drinks in a day: 0  Typical number of drinks in a week: 3  Interpretation: Low risk drinking behavior  AUDIT-C Screenin) How often did you have a drink containing alcohol in the past year? 2 to 3 times a week  2) How many drinks did you have on a typical day when you were drinking in the past year? 1 to 2  3) How often did you have 6 or more drinks on one occasion in the past year? never    AUDIT-C Score: 3  Interpretation: Score 0-3 (male): Negative screen for alcohol misuse    Single Item Drug Screening:  How often have you used an illegal drug (including marijuana) or a prescription medication for non-medical reasons in the past year? never    Single Item Drug Screen Score: 0  Interpretation: Negative screen for possible drug use disorder    Other Counseling Topics:   Car/seat belt/driving safety, skin self-exam, sunscreen and regular weightbearing exercise and calcium and vitamin D intake         Erica Middleton MD

## 2022-01-17 NOTE — PATIENT INSTRUCTIONS
Medicare Preventive Visit Patient Instructions  Thank you for completing your Welcome to Medicare Visit or Medicare Annual Wellness Visit today  Your next wellness visit will be due in one year (1/18/2023)  The screening/preventive services that you may require over the next 5-10 years are detailed below  Some tests may not apply to you based off risk factors and/or age  Screening tests ordered at today's visit but not completed yet may show as past due  Also, please note that scanned in results may not display below  Preventive Screenings:  Service Recommendations Previous Testing/Comments   Colorectal Cancer Screening  · Colonoscopy    · Fecal Occult Blood Test (FOBT)/Fecal Immunochemical Test (FIT)  · Fecal DNA/Cologuard Test  · Flexible Sigmoidoscopy Age: 54-65 years old   Colonoscopy: every 10 years (May be performed more frequently if at higher risk)  OR  FOBT/FIT: every 1 year  OR  Cologuard: every 3 years  OR  Sigmoidoscopy: every 5 years  Screening may be recommended earlier than age 48 if at higher risk for colorectal cancer  Also, an individualized decision between you and your healthcare provider will decide whether screening between the ages of 74-80 would be appropriate  Colonoscopy: Not on file  FOBT/FIT: Not on file  Cologuard: Not on file  Sigmoidoscopy: Not on file          Prostate Cancer Screening Individualized decision between patient and health care provider in men between ages of 53-78   Medicare will cover every 12 months beginning on the day after your 50th birthday PSA: No results in last 5 years           Hepatitis C Screening Once for adults born between 80 and 1965  More frequently in patients at high risk for Hepatitis C Hep C Antibody: 03/02/2019        Diabetes Screening 1-2 times per year if you're at risk for diabetes or have pre-diabetes Fasting glucose: 190 mg/dL   A1C: 7 8        Cholesterol Screening Once every 5 years if you don't have a lipid disorder   May order more often based on risk factors  Lipid panel: 08/19/2019           Other Preventive Screenings Covered by Medicare:  1  Abdominal Aortic Aneurysm (AAA) Screening: covered once if your at risk  You're considered to be at risk if you have a family history of AAA or a male between the age of 73-68 who smoking at least 100 cigarettes in your lifetime  2  Lung Cancer Screening: covers low dose CT scan once per year if you meet all of the following conditions: (1) Age 50-69; (2) No signs or symptoms of lung cancer; (3) Current smoker or have quit smoking within the last 15 years; (4) You have a tobacco smoking history of at least 30 pack years (packs per day x number of years you smoked); (5) You get a written order from a healthcare provider  3  Glaucoma Screening: covered annually if you're considered high risk: (1) You have diabetes OR (2) Family history of glaucoma OR (3)  aged 48 and older OR (3)  American aged 72 and older  3  Osteoporosis Screening: covered every 2 years if you meet one of the following conditions: (1) Have a vertebral abnormality; (2) On glucocorticoid therapy for more than 3 months; (3) Have primary hyperparathyroidism; (4) On osteoporosis medications and need to assess response to drug therapy  5  HIV Screening: covered annually if you're between the age of 12-76  Also covered annually if you are younger than 13 and older than 72 with risk factors for HIV infection  For pregnant patients, it is covered up to 3 times per pregnancy      Immunizations:  Immunization Recommendations   Influenza Vaccine Annual influenza vaccination during flu season is recommended for all persons aged >= 6 months who do not have contraindications   Pneumococcal Vaccine (Prevnar and Pneumovax)  * Prevnar = PCV13  * Pneumovax = PPSV23 Adults 25-60 years old: 1-3 doses may be recommended based on certain risk factors  Adults 72 years old: Prevnar (PCV13) vaccine recommended followed by Pneumovax (PPSV23) vaccine  If already received PPSV23 since turning 65, then PCV13 recommended at least one year after PPSV23 dose  Hepatitis B Vaccine 3 dose series if at intermediate or high risk (ex: diabetes, end stage renal disease, liver disease)   Tetanus (Td) Vaccine - COST NOT COVERED BY MEDICARE PART B Following completion of primary series, a booster dose should be given every 10 years to maintain immunity against tetanus  Td may also be given as tetanus wound prophylaxis  Tdap Vaccine - COST NOT COVERED BY MEDICARE PART B Recommended at least once for all adults  For pregnant patients, recommended with each pregnancy  Shingles Vaccine (Shingrix) - COST NOT COVERED BY MEDICARE PART B  2 shot series recommended in those aged 48 and above     Health Maintenance Due:      Topic Date Due    HIV Screening  Never done    Colorectal Cancer Screening  Never done    Hepatitis C Screening  Completed     Immunizations Due:      Topic Date Due    Pneumococcal Vaccine: 65+ Years (1 of 2 - PPSV23) Never done    DTaP,Tdap,and Td Vaccines (1 - Tdap) Never done    Influenza Vaccine (1) 09/01/2021    COVID-19 Vaccine (3 - Booster for [employer] series) 11/19/2021     Advance Directives   What are advance directives? Advance directives are legal documents that state your wishes and plans for medical care  These plans are made ahead of time in case you lose your ability to make decisions for yourself  Advance directives can apply to any medical decision, such as the treatments you want, and if you want to donate organs  What are the types of advance directives? There are many types of advance directives, and each state has rules about how to use them  You may choose a combination of any of the following:  · Living will: This is a written record of the treatment you want  You can also choose which treatments you do not want, which to limit, and which to stop at a certain time   This includes surgery, medicine, IV fluid, and tube feedings  · Durable power of  for healthcare [facility]): This is a written record that states who you want to make healthcare choices for you when you are unable to make them for yourself  This person, called a proxy, is usually a family member or a friend  You may choose more than 1 proxy  · Do not resuscitate (DNR) order:  A DNR order is used in case your heart stops beating or you stop breathing  It is a request not to have certain forms of treatment, such as CPR  A DNR order may be included in other types of advance directives  · Medical directive: This covers the care that you want if you are in a coma, near death, or unable to make decisions for yourself  You can list the treatments you want for each condition  Treatment may include pain medicine, surgery, blood transfusions, dialysis, IV or tube feedings, and a ventilator (breathing machine)  · Values history: This document has questions about your views, beliefs, and how you feel and think about life  This information can help others choose the care that you would choose  Why are advance directives important? An advance directive helps you control your care  Although spoken wishes may be used, it is better to have your wishes written down  Spoken wishes can be misunderstood, or not followed  Treatments may be given even if you do not want them  An advance directive may make it easier for your family to make difficult choices about your care  Depression   Depression  is a medical condition that causes feelings of sadness or hopelessness that do not go away  Depression may cause you to lose interest in things you used to enjoy  These feelings may interfere with your daily life  Call your local emergency number (911 in the [address] [facility]) if:   · You think about harming yourself or someone else  · You have done something on purpose to hurt yourself    The following resources are available at any time to help you, if needed:   · National Suicide Prevention Lifeline: 5-832.304.4157 (4-987-429-TALK)   · Suicide Hotline: 9-329.566.8488 (2-006-IMFJXWL)   · For a list of international numbers: https://save org/find-help/international-resources/  Treatment for depression may include medicine to relieve depression  Medicine is often used together with therapy  Therapy is a way for you to talk about your feelings and anything that may be causing depression  Therapy can be done alone or in a group  It may also be done with family members or a significant other  · Get regular physical activity  · Create a regular sleep schedule  · Eat a variety of healthy foods  · Do not drink alcohol or use drugs  Weight Management   Why it is important to manage your weight:  Being overweight increases your risk of health conditions such as heart disease, high blood pressure, type 2 diabetes, and certain types of cancer  It can also increase your risk for osteoarthritis, sleep apnea, and other respiratory problems  Aim for a slow, steady weight loss  Even a small amount of weight loss can lower your risk of health problems  How to lose weight safely:  A safe and healthy way to lose weight is to eat fewer calories and get regular exercise  You can lose up about 1 pound a week by decreasing the number of calories you eat by 500 calories each day  Healthy meal plan for weight management:  A healthy meal plan includes a variety of foods, contains fewer calories, and helps you stay healthy  A healthy meal plan includes the following:  · Eat whole-grain foods more often  A healthy meal plan should contain fiber  Fiber is the part of grains, fruits, and vegetables that is not broken down by your body  Whole-grain foods are healthy and provide extra fiber in your diet  Some examples of whole-grain foods are whole-wheat breads and pastas, oatmeal, brown rice, and bulgur  · Eat a variety of vegetables every day    Include dark, leafy greens such as spinach, kale, marcia greens, and mustard greens  Eat yellow and orange vegetables such as carrots, sweet potatoes, and winter squash  · Eat a variety of fruits every day  Choose fresh or canned fruit (canned in its own juice or light syrup) instead of juice  Fruit juice has very little or no fiber  · Eat low-fat dairy foods  Drink fat-free (skim) milk or 1% milk  Eat fat-free yogurt and low-fat cottage cheese  Try low-fat cheeses such as mozzarella and other reduced-fat cheeses  · Choose meat and other protein foods that are low in fat  Choose beans or other legumes such as split peas or lentils  Choose fish, skinless poultry (chicken or turkey), or lean cuts of red meat (beef or pork)  Before you cook meat or poultry, cut off any visible fat  · Use less fat and oil  Try baking foods instead of frying them  Add less fat, such as margarine, sour cream, regular salad dressing and mayonnaise to foods  Eat fewer high-fat foods  Some examples of high-fat foods include french fries, doughnuts, ice cream, and cakes  · Eat fewer sweets  Limit foods and drinks that are high in sugar  This includes candy, cookies, regular soda, and sweetened drinks  Exercise:  Exercise at least 30 minutes per day on most days of the week  Some examples of exercise include walking, biking, dancing, and swimming  You can also fit in more physical activity by taking the stairs instead of the elevator or parking farther away from stores  Ask your healthcare provider about the best exercise plan for you  © Copyright [employer] 2018 Information is for End User's use only and may not be sold, redistributed or otherwise used for commercial purposes   All illustrations and images included in CareNotes® are the copyrighted property of A D A M , [employer]  or [address] [place] [address] [employer]

## 2022-01-17 NOTE — ASSESSMENT & PLAN NOTE
HgbA1c above goal on Empagliflozin 10mg qAM, Glipizide 1mg qD and Lisinopril 5mg qD for proteinuria  - Currently asympt and has been compliant with meds  - GI side-effect with Metformin  - Goal HgbA1c < 7  - C/w current regimen for now  -  on lifestyle improvement including diet & exercise  - Rec daily home BG check and bring log to next visit  - RTC in 3 months for HgbA1c recheck    Lab Results   Component Value Date    HGBA1C 7 4 (A) 01/17/2022    HGBA1C 7 8 (A) 08/13/2020    HGBA1C 6 0 12/06/2019

## 2022-01-28 ENCOUNTER — TELEMEDICINE (OUTPATIENT)
Dept: FAMILY MEDICINE CLINIC | Facility: CLINIC | Age: 67
End: 2022-01-28

## 2022-01-28 ENCOUNTER — NURSE TRIAGE (OUTPATIENT)
Dept: OTHER | Facility: OTHER | Age: 67
End: 2022-01-28

## 2022-01-28 DIAGNOSIS — J02.9 SORE THROAT: Primary | ICD-10-CM

## 2022-01-28 DIAGNOSIS — Z20.828 SARS-ASSOCIATED CORONAVIRUS EXPOSURE: Primary | ICD-10-CM

## 2022-01-28 DIAGNOSIS — E11.9 TYPE 2 DIABETES MELLITUS WITHOUT COMPLICATION, WITHOUT LONG-TERM CURRENT USE OF INSULIN (HCC): ICD-10-CM

## 2022-01-28 PROCEDURE — 99213 OFFICE O/P EST LOW 20 MIN: CPT | Performed by: FAMILY MEDICINE

## 2022-01-28 PROCEDURE — 1160F RVW MEDS BY RX/DR IN RCRD: CPT | Performed by: FAMILY MEDICINE

## 2022-01-28 PROCEDURE — 1036F TOBACCO NON-USER: CPT | Performed by: FAMILY MEDICINE

## 2022-01-28 NOTE — TELEPHONE ENCOUNTER
Symptomatic  Onset 1/28: Sore throat (burning), cough Denies SOB  Denies Chest Pain  Swab order placed  Directed to PCP for SWAB completion  Virtual appointment made for 1/28  Care advice given  Informed to call back if worsening symptoms  Verbalized understanding and agreeable with disposition  No further questions

## 2022-01-28 NOTE — TELEPHONE ENCOUNTER
Regarding: [facility]-throat is burning-fever  ----- Message from Armando Carlos sent at 1/28/2022  6:18 AM EST -----  "My throat is burning and I have a fever '

## 2022-01-28 NOTE — TELEPHONE ENCOUNTER
Reason for Disposition   [1] COVID-19 infection suspected by caller or triager AND [2] mild symptoms (cough, fever, or others) AND [3] has not gotten tested yet    Answer Assessment - Initial Assessment Questions  Were you within 6 feet or less, for up to 15 minutes or more with a person that has a confirmed COVID-19 test?       Brother Positive (same household)            What was the date of your exposure?      n/a           Are you experiencing any symptoms attributed to the virus?  (Assess for SOB, cough, fever, difficulty breathing)     Onset 1/28: Sore throat (burning), cough       HIGH RISK: Do you have any history heart or lung conditions, weakened immune system, diabetes, Asthma, CHF, HIV, COPD, Chemo, renal failure, sickle cell, etc?     DM    Protocols used: CORONAVIRUS (COVID-19) [facility]AGNOSED OR SUSPECTED-[facility]

## 2022-01-28 NOTE — ASSESSMENT & PLAN NOTE
Pt c/o burning-sensation on back of throat after lying down to sleep which caused him to have a cough   Pt states he intermittently have these symptoms after eating spicy food, leaning forward, and lying down flat  - Symptoms resolved today after drinking liquid and OTC Dayquil  - Denies any fever, chill, CP, SOB, or other symptoms  - Has COVID testing ordered  - Symptoms likely gastric reflux but could also be viral infection and does not rec ABX at this time  - Will defer COVID testing at this time  - C/w supportive treatment as discussed  - ED precaution provided  - RTC as needed, no need for routine follow-up

## 2022-01-28 NOTE — PROGRESS NOTES
Virtual Regular Visit    Verification of patient location:    Patient is located in the following state in which I hold an active license [place]      Assessment/Plan:    Problem List Items Addressed This Visit        Other    Sore throat - Primary     Pt c/o burning-sensation on back of throat after lying down to sleep which caused him to have a cough  Pt states he intermittently have these symptoms after eating spicy food, leaning forward, and lying down flat  - Symptoms resolved today after drinking liquid and OTC Dayquil  - Denies any fever, chill, CP, SOB, or other symptoms  - Has COVID testing ordered  - Symptoms likely gastric reflux but could also be viral infection and does not rec ABX at this time  - Will defer COVID testing at this time  - C/w supportive treatment as discussed  - ED precaution provided  - RTC as needed, no need for routine follow-up                    Reason for visit is   Chief Complaint   Patient presents with    Virtual Regular Visit        Encounter provider Gely Larry MD    Provider located at [address] [place] [address]   [phone]      Recent Visits  No visits were found meeting these conditions  Showing recent visits within past 7 days and meeting all other requirements  Today's Visits  Date Type Provider Dept   01/28/22 Telemedicine Gely Larry MD [facility] today's visits and meeting all other requirements  Future Appointments  No visits were found meeting these conditions  Showing future appointments within next 150 days and meeting all other requirements       The patient was identified by name and date of birth  Brittany Taylor was informed that this is a telemedicine visit and that the visit is being conducted through [address] and patient was informed this is a secure, HIPAA-complaint platform  He agrees to proceed     My office door was closed   No one else was in the room   He acknowledged consent and understanding of privacy and security of the video platform  The patient has agreed to participate and understands they can discontinue the visit at any time  Patient is aware this is a billable service  Subjective  Que Lopez is a 73 y o  male presents via virtual visit for c/o  burning-sensation on back of throat after lying down to sleep which caused him to have a cough  Pt states he intermittently have these symptoms after eating spicy food, leaning forward, and lying down flat  Symptoms resolved today after drinking liquid and OTC Dayquil  Denies any fever, chill, CP, SOB, or other symptoms  Has COVID testing ordered  HPI     Past Medical History:   Diagnosis Date    Candidal intertrigo 10/16/2015    Diabetes mellitus ([facility] [address] )     Thyroid nodule 10/16/2015       No past surgical history on file  Current Outpatient Medications   Medication Sig Dispense Refill    atorvastatin (LIPITOR) 40 mg tablet TAKE 1 TABLET BY MOUTH EVERY DAY 90 tablet 3    azelastine (OPTIVAR) 0 05 % ophthalmic solution       Empagliflozin 10 MG TABS Take 1 tablet (10 mg total) by mouth every morning 90 tablet 3    glipiZIDE (GLUCOTROL XL) 5 mg 24 hr tablet TAKE 1 TABLET BY MOUTH EVERY DAY 90 tablet 3    lisinopril (ZESTRIL) 5 mg tablet Take 1 tablet (5 mg total) by mouth daily 90 tablet 2     No current facility-administered medications for this visit  Allergies   Allergen Reactions    Acetaminophen Nausea Only       Review of Systems   Constitutional: Negative for activity change, appetite change and fever  HENT: Positive for sore throat  Negative for ear discharge, ear pain, facial swelling, hearing loss, postnasal drip, rhinorrhea, tinnitus and voice change  Eyes: Negative for pain, discharge, redness and visual disturbance  Respiratory: Positive for cough  Negative for apnea, choking, chest tightness, shortness of breath, wheezing and stridor      Cardiovascular: Negative for chest pain and palpitations  Gastrointestinal: Negative for abdominal distention, abdominal pain, constipation, diarrhea, nausea and vomiting  Genitourinary: Negative for dysuria  Musculoskeletal: Negative for arthralgias, neck pain and neck stiffness  Neurological: Negative for tremors, seizures and weakness  Psychiatric/Behavioral: Negative for agitation, behavioral problems and confusion  Video Exam    There were no vitals filed for this visit  Physical Exam (Unable to perform physical exam due to nature of telephone visit but patient was able to speak in complete sentences without cough or dyspnea )      I spent 20 minutes directly with the patient during this visit    [address] [place] [address] verbally agrees to participate in [employer]  Pt is aware that [employer] could be limited without vital signs or the ability to perform a full hands-on physical exam  Jovani Shi understands he or the provider may request at any time to terminate the video visit and request the patient to seek care or treatment in person

## 2022-02-03 DIAGNOSIS — E11.9 TYPE 2 DIABETES MELLITUS WITHOUT COMPLICATION, WITHOUT LONG-TERM CURRENT USE OF INSULIN (HCC): ICD-10-CM

## 2022-02-04 RX ORDER — GLIPIZIDE 5 MG/1
5 TABLET, FILM COATED, EXTENDED RELEASE ORAL DAILY
Qty: 90 TABLET | Refills: 3 | Status: SHIPPED | OUTPATIENT
Start: 2022-02-04 | End: 2022-05-18 | Stop reason: SDUPTHER

## 2022-04-18 ENCOUNTER — OFFICE VISIT (OUTPATIENT)
Dept: FAMILY MEDICINE CLINIC | Facility: CLINIC | Age: 67
End: 2022-04-18

## 2022-04-18 VITALS
BODY MASS INDEX: 25.01 KG/M2 | SYSTOLIC BLOOD PRESSURE: 109 MMHG | HEIGHT: 66 IN | WEIGHT: 155.6 LBS | TEMPERATURE: 97.3 F | RESPIRATION RATE: 16 BRPM | HEART RATE: 96 BPM | DIASTOLIC BLOOD PRESSURE: 73 MMHG

## 2022-04-18 DIAGNOSIS — R80.9 TYPE 2 DIABETES MELLITUS WITH MICROALBUMINURIA, WITHOUT LONG-TERM CURRENT USE OF INSULIN (HCC): ICD-10-CM

## 2022-04-18 DIAGNOSIS — E11.9 TYPE 2 DIABETES MELLITUS WITHOUT COMPLICATION, WITHOUT LONG-TERM CURRENT USE OF INSULIN (HCC): Primary | ICD-10-CM

## 2022-04-18 DIAGNOSIS — E11.29 TYPE 2 DIABETES MELLITUS WITH MICROALBUMINURIA, WITHOUT LONG-TERM CURRENT USE OF INSULIN (HCC): ICD-10-CM

## 2022-04-18 LAB
CREAT UR-MCNC: 31.5 MG/DL
MICROALBUMIN UR-MCNC: <5 MG/L (ref 0–20)
MICROALBUMIN/CREAT 24H UR: <16 MG/G CREATININE (ref 0–30)
SL AMB POCT HEMOGLOBIN AIC: 7.3 (ref ?–6.5)

## 2022-04-18 PROCEDURE — 82570 ASSAY OF URINE CREATININE: CPT | Performed by: FAMILY MEDICINE

## 2022-04-18 PROCEDURE — 82043 UR ALBUMIN QUANTITATIVE: CPT | Performed by: FAMILY MEDICINE

## 2022-04-18 PROCEDURE — 83036 HEMOGLOBIN GLYCOSYLATED A1C: CPT | Performed by: FAMILY MEDICINE

## 2022-04-18 PROCEDURE — 99213 OFFICE O/P EST LOW 20 MIN: CPT | Performed by: FAMILY MEDICINE

## 2022-04-18 PROCEDURE — 3066F NEPHROPATHY DOC TX: CPT | Performed by: FAMILY MEDICINE

## 2022-04-18 PROCEDURE — 3051F HG A1C>EQUAL 7.0%<8.0%: CPT | Performed by: FAMILY MEDICINE

## 2022-04-18 PROCEDURE — 3008F BODY MASS INDEX DOCD: CPT | Performed by: FAMILY MEDICINE

## 2022-04-18 PROCEDURE — 3725F SCREEN DEPRESSION PERFORMED: CPT | Performed by: FAMILY MEDICINE

## 2022-04-18 PROCEDURE — 1160F RVW MEDS BY RX/DR IN RCRD: CPT | Performed by: FAMILY MEDICINE

## 2022-04-18 PROCEDURE — 1036F TOBACCO NON-USER: CPT | Performed by: FAMILY MEDICINE

## 2022-04-18 NOTE — ASSESSMENT & PLAN NOTE
HgbA1c slightly above goal on Empagliflozin 10mg qAM, Glipizide 1mg qD and Lisinopril 5mg qD for proteinuria  - Currently asympt and has been compliant with meds  - GI side-effect with Metformin  - Goal HgbA1c < 7  - DM eye exam 4/18  - Pt declines DM foot exam today (4/18)  - C/w current regimen for now  -  on lifestyle improvement including diet & exercise  - Rec daily home BG check and bring log to next visit  - RTC in 3 months for HgbA1c recheck    Lab Results   Component Value Date    HGBA1C 7 3 (A) 04/18/2022    HGBA1C 7 4 (A) 01/17/2022    HGBA1C 7 8 (A) 08/13/2020

## 2022-04-18 NOTE — PROGRESS NOTES
Ana Maria Del Cid [age] y o  male MRN: [id number]  Encounter: [id number],  Primary Care Provider: Michelle Garcia MD      Date: 04/18/22    Assessments & Plans:     Problem List Items Addressed This Visit        Endocrine    Type 2 diabetes mellitus with microalbuminuria, without long-term current use of insulin ([facility])     HgbA1c slightly above goal on Empagliflozin 10mg qAM, Glipizide 1mg qD and Lisinopril 5mg qD for proteinuria  - Currently asympt and has been compliant with meds  - GI side-effect with Metformin  - Goal HgbA1c < 7  - DM eye exam 4/18  - Pt declines DM foot exam today (4/18)  - C/w current regimen for now  -  on lifestyle improvement including diet & exercise  - Rec daily home BG check and bring log to next visit  - RTC in 3 months for HgbA1c recheck    Lab Results   Component Value Date    HGBA1C 7 3 (A) 04/18/2022    HGBA1C 7 4 (A) 01/17/2022    HGBA1C 7 8 (A) 08/13/2020            Relevant Orders    Microalbumin / creatinine urine ratio      Other Visit Diagnoses     Type 2 diabetes mellitus without complication, without long-term current use of insulin ([facility] )    -  Primary    Relevant Orders    POCT hemoglobin A1c (Completed)          Follow-up: Return in about 3 months (around 7/18/2022) for HgbA1c recheck  Patient Active Problem List   Diagnosis    Type 2 diabetes mellitus with microalbuminuria, without long-term current use of insulin ([facility] [address] )    Hyperlipidemia    History of thyroid nodule    History of herniated intervertebral disc    Exposure to COVID-19 virus    Screening for depression    Sore throat         Recent Visits:     Recent Visits  No visits were found meeting these conditions    Showing recent visits within past 7 days and meeting all other requirements  Today's Visits  Date Type Provider Dept   04/18/22 Office Visit Michelle Garcia MD  Mirlande Earl   Showing today's visits and meeting all other requirements  Future Appointments  No visits were found meeting these conditions  Showing future appointments within next 150 days and meeting all other requirements      Subjective:     Chief Complaint   Patient presents with    Diabetes     has not been able to complete blood work    Skin Problem     dry skin, requesting cream that is not OTC       HPI:  73 y o  male presents for HgbA1c recheck  Pt states his mother recently passed away on Feb 3rd 2022  Otherwise he's been at baseline state of health without any other questions/concerns  Review of System:     Review of Systems   Constitutional: Negative for activity change, appetite change and fever  HENT: Negative for ear discharge, ear pain, facial swelling, hearing loss, postnasal drip, rhinorrhea, sore throat, tinnitus and voice change  Eyes: Negative for pain, discharge, redness and visual disturbance  Respiratory: Negative for apnea, cough, choking, chest tightness, shortness of breath, wheezing and stridor  Cardiovascular: Negative for chest pain and palpitations  Gastrointestinal: Negative for abdominal distention, abdominal pain, constipation, diarrhea, nausea and vomiting  Genitourinary: Negative for dysuria  Musculoskeletal: Negative for arthralgias, neck pain and neck stiffness  Skin:        Dry skin on face   Neurological: Negative for tremors, seizures and weakness  Psychiatric/Behavioral: Negative for agitation, behavioral problems and confusion  A 12-point, complete review of systems was reviewed and negative except as stated above   History:     Past Medical History:   Diagnosis Date    Candidal intertrigo 10/16/2015    Diabetes mellitus ([facility] )     Thyroid nodule 10/16/2015     History reviewed  No pertinent surgical history    Social History   Social History     Substance and Sexual Activity   Alcohol Use Yes    Alcohol/week: 1 0 standard drink    Types: 1 Cans of beer per week     Social History Substance and Sexual Activity   Drug Use No     Social History     Tobacco Use   Smoking Status Never Smoker   Smokeless Tobacco Never Used       Medications & Allergies: Allergies   Allergen Reactions    Acetaminophen Nausea Only       PTA Medications:  Current Outpatient Medications on File Prior to Visit   Medication Sig Dispense Refill    atorvastatin (LIPITOR) 40 mg tablet TAKE 1 TABLET BY MOUTH EVERY DAY 90 tablet 3    azelastine (OPTIVAR) 0 05 % ophthalmic solution       Empagliflozin 10 MG TABS Take 1 tablet (10 mg total) by mouth every morning 90 tablet 3    glipiZIDE (GLUCOTROL XL) 5 mg 24 hr tablet Take 1 tablet (5 mg total) by mouth daily 90 tablet 3    lisinopril (ZESTRIL) 5 mg tablet Take 1 tablet (5 mg total) by mouth daily 90 tablet 2     No current facility-administered medications on file prior to visit  Objective & Vitals:   Vitals: /73 (BP Location: Left arm, Patient Position: Sitting, Cuff Size: Standard)   Pulse 96   Temp (!) 97 3 °F (36 3 °C) (Temporal)   Resp 16   Ht 5' 6" (1 676 m)   Wt 70 6 kg (155 lb 9 6 oz)   BMI 25 11 kg/m²       Labs, Imagings, and other studies:   I have personally reviewed pertinent reports  Recent Results (from the past 24 hour(s))   POCT hemoglobin A1c    Collection Time: 04/18/22  4:05 PM   Result Value Ref Range    Hemoglobin A1C 7 3 (A) 6 5           Physical Exam:     Physical Exam  Vitals reviewed  Constitutional:       General: He is not in acute distress  Appearance: Normal appearance  He is normal weight  He is not ill-appearing, toxic-appearing or diaphoretic  HENT:      Head: Normocephalic and atraumatic  Right Ear: External ear normal       Left Ear: External ear normal       Nose: Nose normal       Mouth/Throat:      Mouth: Mucous membranes are moist       Pharynx: Oropharynx is clear  Eyes:      General: No scleral icterus  Right eye: No discharge  Left eye: No discharge        Extraocular Movements: Extraocular movements intact  Conjunctiva/sclera: Conjunctivae normal    Cardiovascular:      Rate and Rhythm: Normal rate and regular rhythm  Pulses: Normal pulses  Heart sounds: Normal heart sounds  No murmur heard  Pulmonary:      Effort: Pulmonary effort is normal  No respiratory distress  Breath sounds: Normal breath sounds  No wheezing or rales  Abdominal:      General: Abdomen is flat  Bowel sounds are normal  There is no distension  Palpations: Abdomen is soft  There is no mass  Tenderness: There is no abdominal tenderness  There is no guarding  Musculoskeletal:      Cervical back: Normal range of motion and neck supple  No rigidity  Skin:     General: Skin is warm  Coloration: Skin is not jaundiced or pale  Neurological:      Mental Status: He is alert and oriented to person, place, and time  Mental status is at baseline  Psychiatric:         Mood and Affect: Mood normal          Behavior: Behavior normal          Thought Content:  Thought content normal          Judgment: Judgment normal            Future Labs & Appointments:     FUTURE LABS:  Lab Frequency Next Occurrence   DXA bone density spine hip and pelvis Once 06/17/2022   US abdominal aorta screening aaa Once 04/18/2022       FUTURE CONFIRMED APPOINTMENTS:  Future Appointments   Date Time Provider Dyan Miller   7/19/2022  1:10 PM Caroline Cross MD [facility]       Caroline Cross MD  PGY-2, Family Medicine  04/18/22, 4:07 PM

## 2022-05-18 DIAGNOSIS — E78.5 HYPERLIPIDEMIA, UNSPECIFIED HYPERLIPIDEMIA TYPE: ICD-10-CM

## 2022-05-18 DIAGNOSIS — E11.9 TYPE 2 DIABETES MELLITUS WITHOUT COMPLICATION, WITHOUT LONG-TERM CURRENT USE OF INSULIN (HCC): ICD-10-CM

## 2022-05-18 RX ORDER — GLIPIZIDE 5 MG/1
5 TABLET, FILM COATED, EXTENDED RELEASE ORAL DAILY
Qty: 100 TABLET | Refills: 2 | Status: SHIPPED | OUTPATIENT
Start: 2022-05-18

## 2022-05-18 RX ORDER — ATORVASTATIN CALCIUM 40 MG/1
40 TABLET, FILM COATED ORAL DAILY
Qty: 100 TABLET | Refills: 2 | Status: SHIPPED | OUTPATIENT
Start: 2022-05-18

## 2022-05-18 NOTE — TELEPHONE ENCOUNTER
[facility] from [facility] requesting specificly 100 DAY quantity and 3 refill for the following medications:     Atorvastatin tab 40 mg  Glipizide XL tab 5mg    Please send to patients desired pharmacy

## 2022-07-26 ENCOUNTER — TELEPHONE (OUTPATIENT)
Dept: FAMILY MEDICINE CLINIC | Facility: CLINIC | Age: 67
End: 2022-07-26

## 2022-09-02 PROCEDURE — 3066F NEPHROPATHY DOC TX: CPT | Performed by: FAMILY MEDICINE

## 2022-09-20 ENCOUNTER — OFFICE VISIT (OUTPATIENT)
Dept: FAMILY MEDICINE CLINIC | Facility: CLINIC | Age: 67
End: 2022-09-20

## 2022-09-20 VITALS
SYSTOLIC BLOOD PRESSURE: 132 MMHG | OXYGEN SATURATION: 99 % | WEIGHT: 155.8 LBS | HEART RATE: 80 BPM | DIASTOLIC BLOOD PRESSURE: 79 MMHG | HEIGHT: 66 IN | RESPIRATION RATE: 16 BRPM | TEMPERATURE: 97.9 F | BODY MASS INDEX: 25.04 KG/M2

## 2022-09-20 DIAGNOSIS — R80.9 TYPE 2 DIABETES MELLITUS WITH MICROALBUMINURIA, WITHOUT LONG-TERM CURRENT USE OF INSULIN (HCC): Primary | ICD-10-CM

## 2022-09-20 DIAGNOSIS — E11.29 TYPE 2 DIABETES MELLITUS WITH MICROALBUMINURIA, WITHOUT LONG-TERM CURRENT USE OF INSULIN (HCC): Primary | ICD-10-CM

## 2022-09-20 DIAGNOSIS — L21.9 SEBORRHEIC DERMATITIS: ICD-10-CM

## 2022-09-20 LAB — SL AMB POCT HEMOGLOBIN AIC: 7 (ref ?–6.5)

## 2022-09-20 PROCEDURE — 3051F HG A1C>EQUAL 7.0%<8.0%: CPT | Performed by: FAMILY MEDICINE

## 2022-09-20 PROCEDURE — 99213 OFFICE O/P EST LOW 20 MIN: CPT | Performed by: FAMILY MEDICINE

## 2022-09-20 PROCEDURE — 1160F RVW MEDS BY RX/DR IN RCRD: CPT | Performed by: FAMILY MEDICINE

## 2022-09-20 PROCEDURE — 83036 HEMOGLOBIN GLYCOSYLATED A1C: CPT | Performed by: FAMILY MEDICINE

## 2022-09-20 PROCEDURE — 4010F ACE/ARB THERAPY RXD/TAKEN: CPT | Performed by: FAMILY MEDICINE

## 2022-09-20 RX ORDER — BLOOD-GLUCOSE METER
KIT MISCELLANEOUS
Qty: 300 STRIP | Refills: 0 | Status: SHIPPED | OUTPATIENT
Start: 2022-09-20

## 2022-09-20 RX ORDER — LANCETS 28 GAUGE
EACH MISCELLANEOUS
Qty: 300 EACH | Refills: 0 | Status: SHIPPED | OUTPATIENT
Start: 2022-09-20

## 2022-09-20 RX ORDER — CLOTRIMAZOLE AND BETAMETHASONE DIPROPIONATE 10; .5 MG/ML; MG/ML
LOTION TOPICAL 2 TIMES DAILY
Qty: 30 ML | Refills: 0 | Status: SHIPPED | OUTPATIENT
Start: 2022-09-20

## 2022-09-20 RX ORDER — BLOOD-GLUCOSE METER
KIT MISCELLANEOUS
Qty: 1 EACH | Refills: 0 | Status: SHIPPED | OUTPATIENT
Start: 2022-09-20

## 2022-09-20 RX ORDER — LISINOPRIL 5 MG/1
5 TABLET ORAL DAILY
Qty: 90 TABLET | Refills: 3 | Status: SHIPPED | OUTPATIENT
Start: 2022-09-20

## 2022-09-20 NOTE — PROGRESS NOTES
Name: Eliceo Daugherty      : 1955      MRN: [id number]  Encounter Provider: Autumn Moody MD  Encounter Date: 2022   Encounter department: [address] [place] [address]  Type 2 diabetes mellitus with microalbuminuria, without long-term current use of insulin ([facility])  Assessment & Plan:  Well controlled on home Glipizide 5 mg daily and Jardiance 10mg daily  - Currently asympt and compliant with treatment plan  - Patient does not check home blood sugar  - Goal HgbA1c < 7%  BGs goals: Preprandial: 80-130mg/dL and Postprandial: <180mg/dL  - Screening:  - 20 BUN/sCr wnl  - 22 Microalb:Cr wnl  - Patient declines DM foot exam  - IRIS -- ordered today  - Rec lifestyle improvement weight loss, follow a diabetic diet, and decrease high carbohydrates snacks  - Counseled the patient on compliance with medications, and exercise as tolerated  - C/w current regimen for now  - Referral to clinical pharmacist for assistance with education and management  - Daily home BG check and bring log to follow-up visit  - RTC in 3 months for HgbA1c recheck  - Consider uptitrating oral antihyperglycemics in HgbA1c persistently above goal    Lab Results   Component Value Date/Time    HGBA1C 7 3 (A) 2022 04:05 PM    HGBA1C 7 4 (A) 2022 01:23 PM    HGBA1C 6 0 2019 05:24 PM    HGBA1C 6 2 2019 09:10 AM    HGBA1C 10 7% 10/09/2017 04:19 PM       Orders:  -     POCT hemoglobin A1c  -     IRIS Diabetic eye exam  -     lisinopril (ZESTRIL) 5 mg tablet; Take 1 tablet (5 mg total) by mouth daily  -     Blood Glucose Monitoring Suppl (FreeStyle Lite) BATSHEVA; Check fasting blood sugar every other day in the morning before breakfast   -     glucose blood (FREESTYLE LITE) test strip; Check fasting blood sugar every other day in the morning before breakfast   -     Lancets (freestyle) lancets;  Check fasting blood sugar every other day in the morning before breakfast     2  Seborrheic dermatitis  -     clotrimazole-betamethasone (LOTRISONE) 1-0 05 % lotion; Apply topically 2 (two) times a day           Subjective      16-year-old male presents for diabetes follow-up  Patient states he has been compliant with his medication and denies any side effects or concern this time  Review of Systems   Constitutional: Negative for chills and fever  HENT: Negative for ear pain, sinus pain, sore throat and trouble swallowing  Eyes: Negative for pain and discharge  Respiratory: Negative for shortness of breath  Cardiovascular: Negative for chest pain, palpitations and leg swelling  Gastrointestinal: Negative for abdominal pain, nausea and vomiting  Endocrine: Negative for polydipsia and polyuria  Genitourinary: Negative for dysuria and hematuria  Musculoskeletal: Negative for arthralgias and myalgias  Skin: Negative for rash and wound  Neurological: Negative for seizures and syncope  Psychiatric/Behavioral: Negative for behavioral problems  Current Outpatient Medications on File Prior to Visit   Medication Sig    atorvastatin (LIPITOR) 40 mg tablet Take 1 tablet (40 mg total) by mouth in the morning   azelastine (OPTIVAR) 0 05 % ophthalmic solution     Empagliflozin 10 MG TABS Take 1 tablet (10 mg total) by mouth every morning    glipiZIDE (GLUCOTROL XL) 5 mg 24 hr tablet Take 1 tablet (5 mg total) by mouth in the morning   [DISCONTINUED] lisinopril (ZESTRIL) 5 mg tablet Take 1 tablet (5 mg total) by mouth daily       Objective     /79 (BP Location: Left arm, Patient Position: Sitting, Cuff Size: Standard)   Pulse 80   Temp 97 9 °F (36 6 °C) (Temporal)   Resp 16   Ht 5' 6" (1 676 m)   Wt 70 7 kg (155 lb 12 8 oz)   SpO2 99%   BMI 25 15 kg/m²     Physical Exam  Vitals and nursing note reviewed  Constitutional:       General: He is not in acute distress  Appearance: Normal appearance  He is normal weight   He is not ill-appearing, toxic-appearing or diaphoretic  HENT:      Head: Normocephalic and atraumatic  Right Ear: External ear normal       Left Ear: External ear normal       Nose: Nose normal       Mouth/Throat:      Pharynx: Oropharynx is clear  Eyes:      Extraocular Movements: Extraocular movements intact  Conjunctiva/sclera: Conjunctivae normal    Cardiovascular:      Rate and Rhythm: Normal rate  Pulmonary:      Effort: Pulmonary effort is normal    Abdominal:      General: There is no distension  Musculoskeletal:         General: Normal range of motion  Cervical back: Normal range of motion  Skin:     General: Skin is warm  Coloration: Skin is not jaundiced or pale  Neurological:      General: No focal deficit present  Mental Status: He is alert  Mental status is at baseline     Psychiatric:         Mood and Affect: Mood normal          Behavior: Behavior normal        Love Martin MD

## 2022-09-20 NOTE — ASSESSMENT & PLAN NOTE
Well controlled on home Glipizide 5 mg daily and Jardiance 10mg daily  - Currently asympt and compliant with treatment plan  - Patient does not check home blood sugar  - Goal HgbA1c < 7%  BGs goals: Preprandial: 80-130mg/dL and Postprandial: <180mg/dL  - Screening:  - 9/24/20 BUN/sCr wnl  - 4/18/22 Microalb:Cr wnl  - Patient declines DM foot exam  - IRIS -- ordered today  - Rec lifestyle improvement weight loss, follow a diabetic diet, and decrease high carbohydrates snacks  - Counseled the patient on compliance with medications, and exercise as tolerated    - C/w current regimen for now  - Referral to clinical pharmacist for assistance with education and management  - Daily home BG check and bring log to follow-up visit  - RTC in 3 months for HgbA1c recheck  - Consider uptitrating oral antihyperglycemics in HgbA1c persistently above goal    Lab Results   Component Value Date/Time    HGBA1C 7 3 (A) 04/18/2022 04:05 PM    HGBA1C 7 4 (A) 01/17/2022 01:23 PM    HGBA1C 6 0 12/06/2019 05:24 PM    HGBA1C 6 2 08/19/2019 09:10 AM    HGBA1C 10 7% 10/09/2017 04:19 PM

## 2022-10-12 PROBLEM — Z13.31 SCREENING FOR DEPRESSION: Status: RESOLVED | Noted: 2021-11-17 | Resolved: 2022-10-12

## 2023-01-27 DIAGNOSIS — E11.9 TYPE 2 DIABETES MELLITUS WITHOUT COMPLICATION, WITHOUT LONG-TERM CURRENT USE OF INSULIN (HCC): ICD-10-CM

## 2023-01-30 RX ORDER — EMPAGLIFLOZIN 10 MG/1
TABLET, FILM COATED ORAL
Qty: 90 TABLET | Refills: 3 | Status: SHIPPED | OUTPATIENT
Start: 2023-01-30

## 2023-03-29 ENCOUNTER — TELEPHONE (OUTPATIENT)
Dept: FAMILY MEDICINE CLINIC | Facility: CLINIC | Age: 68
End: 2023-03-29

## 2023-05-02 ENCOUNTER — TELEPHONE (OUTPATIENT)
Dept: FAMILY MEDICINE CLINIC | Facility: CLINIC | Age: 68
End: 2023-05-02

## 2023-05-02 NOTE — TELEPHONE ENCOUNTER
Folder (Dr Kalina Bhardwaj) -     Name of Form - [employer]       Care Consideration    Color folder (Yellow)    Form to be Faxed ([phone])     Patient was made aware of the 7-10 business day form policy

## 2023-05-03 NOTE — TELEPHONE ENCOUNTER
Form no need signature from the provider  Is just to take in consideration to order an eye exam due to patient is Diabetic and he has not complete one in the past two years  Patient already have an order for the eye doctor to have it done  Question answered an form placed back on YELLOW clerical folder for faxing

## 2023-05-12 DIAGNOSIS — E78.5 HYPERLIPIDEMIA, UNSPECIFIED HYPERLIPIDEMIA TYPE: ICD-10-CM

## 2023-05-12 RX ORDER — ATORVASTATIN CALCIUM 40 MG/1
40 TABLET, FILM COATED ORAL DAILY
Qty: 90 TABLET | Refills: 2 | Status: SHIPPED | OUTPATIENT
Start: 2023-05-12

## 2023-05-21 DIAGNOSIS — E11.9 TYPE 2 DIABETES MELLITUS WITHOUT COMPLICATION, WITHOUT LONG-TERM CURRENT USE OF INSULIN (HCC): ICD-10-CM

## 2023-05-24 RX ORDER — GLIPIZIDE 5 MG/1
5 TABLET, FILM COATED, EXTENDED RELEASE ORAL DAILY
Qty: 90 TABLET | Refills: 2 | Status: SHIPPED | OUTPATIENT
Start: 2023-05-24

## 2023-06-13 ENCOUNTER — TELEPHONE (OUTPATIENT)
Dept: FAMILY MEDICINE CLINIC | Facility: CLINIC | Age: 68
End: 2023-06-13

## 2023-06-22 ENCOUNTER — TELEPHONE (OUTPATIENT)
Dept: FAMILY MEDICINE CLINIC | Facility: CLINIC | Age: 68
End: 2023-06-22

## 2023-06-27 ENCOUNTER — TELEPHONE (OUTPATIENT)
Dept: FAMILY MEDICINE CLINIC | Facility: CLINIC | Age: 68
End: 2023-06-27

## 2023-06-27 NOTE — TELEPHONE ENCOUNTER
Dr Renu Duffy was assigned as a pcp when this message was sent to    PCP recently change to Dr Charleen Nguyễn

## 2023-06-30 NOTE — TELEPHONE ENCOUNTER
06/30/23 6:36 PM        The office's request has been received  If a patient has not been seen in within last 3 years, 3 phone calls and a certified letter (must be letter listed in workflow) are to be sent  Once that workflow is completed, please resend PCP removal request with date letter was sent  PCP will be removed 30 days after certified letter sent (if patient doesn't respond/scheduled with office)            Thank you  Camila Marion

## 2023-10-01 DIAGNOSIS — E11.29 TYPE 2 DIABETES MELLITUS WITH MICROALBUMINURIA, WITHOUT LONG-TERM CURRENT USE OF INSULIN: ICD-10-CM

## 2023-10-01 DIAGNOSIS — R80.9 TYPE 2 DIABETES MELLITUS WITH MICROALBUMINURIA, WITHOUT LONG-TERM CURRENT USE OF INSULIN: ICD-10-CM

## 2023-10-02 RX ORDER — LISINOPRIL 5 MG/1
5 TABLET ORAL DAILY
Qty: 90 TABLET | Refills: 3 | Status: SHIPPED | OUTPATIENT
Start: 2023-10-02

## 2023-10-16 DIAGNOSIS — E11.29 TYPE 2 DIABETES MELLITUS WITH MICROALBUMINURIA, WITHOUT LONG-TERM CURRENT USE OF INSULIN: ICD-10-CM

## 2023-10-16 DIAGNOSIS — R80.9 TYPE 2 DIABETES MELLITUS WITH MICROALBUMINURIA, WITHOUT LONG-TERM CURRENT USE OF INSULIN: ICD-10-CM

## 2023-10-16 RX ORDER — BLOOD-GLUCOSE METER
EACH MISCELLANEOUS
Qty: 25 STRIP | Refills: 11 | Status: SHIPPED | OUTPATIENT
Start: 2023-10-16

## 2023-11-29 DIAGNOSIS — E11.29 TYPE 2 DIABETES MELLITUS WITH MICROALBUMINURIA, WITHOUT LONG-TERM CURRENT USE OF INSULIN: ICD-10-CM

## 2023-11-29 DIAGNOSIS — R80.9 TYPE 2 DIABETES MELLITUS WITH MICROALBUMINURIA, WITHOUT LONG-TERM CURRENT USE OF INSULIN: ICD-10-CM

## 2023-11-29 RX ORDER — LANCETS 28 GAUGE
EACH MISCELLANEOUS
Qty: 300 EACH | Refills: 1 | Status: SHIPPED | OUTPATIENT
Start: 2023-11-29

## 2023-12-26 ENCOUNTER — OFFICE VISIT (OUTPATIENT)
Dept: FAMILY MEDICINE CLINIC | Facility: CLINIC | Age: 68
End: 2023-12-26

## 2023-12-26 VITALS
HEIGHT: 66 IN | WEIGHT: 151.8 LBS | RESPIRATION RATE: 18 BRPM | DIASTOLIC BLOOD PRESSURE: 76 MMHG | TEMPERATURE: 98.7 F | BODY MASS INDEX: 24.4 KG/M2 | OXYGEN SATURATION: 99 % | SYSTOLIC BLOOD PRESSURE: 135 MMHG | HEART RATE: 94 BPM

## 2023-12-26 DIAGNOSIS — E78.5 HYPERLIPIDEMIA, UNSPECIFIED HYPERLIPIDEMIA TYPE: ICD-10-CM

## 2023-12-26 DIAGNOSIS — Z13.6 SCREENING FOR CARDIOVASCULAR CONDITION: ICD-10-CM

## 2023-12-26 DIAGNOSIS — Z00.00 MEDICARE ANNUAL WELLNESS VISIT, INITIAL: ICD-10-CM

## 2023-12-26 DIAGNOSIS — Z12.12 SCREENING FOR COLORECTAL CANCER: ICD-10-CM

## 2023-12-26 DIAGNOSIS — Z00.00 MEDICARE ANNUAL WELLNESS VISIT, SUBSEQUENT: Primary | ICD-10-CM

## 2023-12-26 DIAGNOSIS — E11.29 TYPE 2 DIABETES MELLITUS WITH MICROALBUMINURIA, WITHOUT LONG-TERM CURRENT USE OF INSULIN: ICD-10-CM

## 2023-12-26 DIAGNOSIS — Z12.11 SCREENING FOR COLORECTAL CANCER: ICD-10-CM

## 2023-12-26 DIAGNOSIS — Z23 ENCOUNTER FOR IMMUNIZATION: ICD-10-CM

## 2023-12-26 DIAGNOSIS — R80.9 TYPE 2 DIABETES MELLITUS WITH MICROALBUMINURIA, WITHOUT LONG-TERM CURRENT USE OF INSULIN: ICD-10-CM

## 2023-12-26 LAB — SL AMB POCT HEMOGLOBIN AIC: 6.4 (ref ?–6.5)

## 2023-12-26 PROCEDURE — 90662 IIV NO PRSV INCREASED AG IM: CPT | Performed by: FAMILY MEDICINE

## 2023-12-26 PROCEDURE — 83036 HEMOGLOBIN GLYCOSYLATED A1C: CPT | Performed by: FAMILY MEDICINE

## 2023-12-26 PROCEDURE — 90471 IMMUNIZATION ADMIN: CPT | Performed by: FAMILY MEDICINE

## 2023-12-26 PROCEDURE — G0439 PPPS, SUBSEQ VISIT: HCPCS | Performed by: FAMILY MEDICINE

## 2023-12-26 NOTE — PROGRESS NOTES
Assessment and Plan:     Problem List Items Addressed This Visit       Type 2 diabetes mellitus with microalbuminuria, without long-term current use of insulin        Lab Results   Component Value Date    HGBA1C 6.4 12/26/2023   Well-controlled continue with current regimen of Jardiance 10 mg and glipizide 5 mg  Patient patient due for diabetic foot exam to next visit  Diabetic IRIS exam ordered            Relevant Orders    IRIS Diabetic eye exam    Albumin / creatinine urine ratio    POCT hemoglobin A1c (Completed)    Hyperlipidemia    Relevant Orders    Lipid panel    Screening for colorectal cancer    Relevant Orders    Cologuard    Medicare annual wellness visit, subsequent - Primary     - Declined TDAP, pneumonia vacccine; interested in Flu shot   - Discussed diabetic status, HLD  - Sent blood work to assess renal function, A1C and cholesterol   - Cologuard discussion for colorectal cancer screening   - Advance directive discussed           Other Visit Diagnoses       Medicare annual wellness visit, initial        Screening for cardiovascular condition        Relevant Orders    Lipid panel            Depression Screening and Follow-up Plan: Patient was screened for depression during today's encounter. They screened negative with a PHQ-2 score of 2.      Preventive health issues were discussed with patient, and age appropriate screening tests were ordered as noted in patient's After Visit Summary.  Personalized health advice and appropriate referrals for health education or preventive services given if needed, as noted in patient's After Visit Summary.     History of Present Illness:     Patient presents for a Medicare Wellness Visit    HPI   68 years old male history of type 2 diabetes well-controlled not on insulin, hypertension, hyperlipidemia presents today for Medicare annual wellness, no acute concerns.  Diabetic recheck today showed stable A1c; discussed colorectal cancer screening,  Patient Care  Team:  Joaquín Delacruz DO as PCP - General (Family Medicine)  Tyler Valenzuela MD as PCP - [facility] ([facility])     Review of Systems:     Review of Systems   Constitutional:  Negative for chills and fever.   HENT:  Negative for ear pain and sore throat.    Eyes:  Negative for pain and visual disturbance.   Respiratory:  Negative for cough and shortness of breath.    Cardiovascular:  Negative for chest pain and palpitations.   Gastrointestinal:  Negative for abdominal pain and vomiting.   Genitourinary:  Negative for dysuria and hematuria.   Musculoskeletal:  Positive for back pain. Negative for arthralgias.   Skin:  Negative for color change and rash.   Neurological:  Negative for seizures and syncope.   All other systems reviewed and are negative.       Problem List:     Patient Active Problem List   Diagnosis    Type 2 diabetes mellitus with microalbuminuria, without long-term current use of insulin     Hyperlipidemia    History of thyroid nodule    History of herniated intervertebral disc    Exposure to COVID-19 virus    Screening for colorectal cancer    Sore throat    Medicare annual wellness visit, subsequent      Past Medical and Surgical History:     Past Medical History:   Diagnosis Date    Candidal intertrigo 10/16/2015    Diabetes mellitus (HCC)     Thyroid nodule 10/16/2015     History reviewed. No pertinent surgical history.   Family History:     History reviewed. No pertinent family history.   Social History:     Social History     Socioeconomic History    Marital status: Single     Spouse name: None    Number of children: None    Years of education: None    Highest education level: None   Occupational History    None   Tobacco Use    Smoking status: Never    Smokeless tobacco: Never   Vaping Use    Vaping status: Never Used   Substance and Sexual Activity    Alcohol use: Yes     Alcohol/week: 1.0 standard drink of alcohol     Types: 1 Cans of beer per week    Drug use: No    Sexual activity: None    Other Topics Concern    None   Social History Narrative    None     Social Determinants of Health     Financial Resource Strain: Medium Risk (12/24/2023)    Overall Financial Resource Strain (CARDIA)     Difficulty of Paying Living Expenses: Somewhat hard   Food Insecurity: Food Insecurity Present (12/24/2023)    Hunger Vital Sign     Worried About Running Out of Food in the Last Year: Sometimes true     Ran Out of Food in the Last Year: Never true   Transportation Needs: No Transportation Needs (12/24/2023)    PRAPARE - Transportation     Lack of Transportation (Medical): No     Lack of Transportation (Non-Medical): No   Physical Activity: Not on file   Stress: No Stress Concern Present (12/26/2023)    [employer] [place] [employer] - Occupational Stress Questionnaire     Feeling of Stress : Not at all   Social Connections: Not on file   Intimate Partner Violence: Not At Risk (12/26/2023)    Humiliation, Afraid, Rape, and Kick questionnaire     Fear of Current or Ex-Partner: No     Emotionally Abused: No     Physically Abused: No     Sexually Abused: No   Housing Stability: Low Risk  (12/26/2023)    Housing Stability Vital Sign     Unable to Pay for Housing in the Last Year: No     Number of Places Lived in the Last Year: 1     Unstable Housing in the Last Year: No      Medications and Allergies:     Current Outpatient Medications   Medication Sig Dispense Refill    atorvastatin (LIPITOR) 40 mg tablet TAKE 1 TABLET (40 MG TOTAL) BY MOUTH IN THE MORNING. 90 tablet 2    azelastine (OPTIVAR) 0.05 % ophthalmic solution       clotrimazole-betamethasone (LOTRISONE) 1-0.05 % lotion Apply topically 2 (two) times a day 30 mL 0    glipiZIDE (GLUCOTROL XL) 5 mg 24 hr tablet TAKE 1 TABLET (5 MG TOTAL) BY MOUTH IN THE MORNING. 90 tablet 2    glucose blood (OneTouch Verio) test strip CHECK FASTING BLOOD SUGAR EVERY OTHER DAY IN THE MORNING BEFORE BREAKFAST. 25 strip 11    Jardiance 10 MG TABS tablet TAKE 1 TABLET (10  MG TOTAL) BY MOUTH EVERY MORNING. 90 tablet 3    lisinopril (ZESTRIL) 5 mg tablet TAKE 1 TABLET (5 MG TOTAL) BY MOUTH DAILY. 90 tablet 3    Blood Glucose Monitoring Suppl (FreeStyle Lite) BATSHEVA Check fasting blood sugar every other day in the morning before breakfast. 1 each 0    Lancets (freestyle) lancets Check fasting blood sugar every other day in the morning before breakfast. 300 each 1     No current facility-administered medications for this visit.     Allergies   Allergen Reactions    Acetaminophen Nausea Only      Immunizations:     Immunization History   Administered Date(s) Administered    COVID-19 PFIZER VACCINE 0.3 ML IM 04/28/2021, 05/19/2021, 12/17/2021    Influenza, high dose seasonal 0.7 mL 12/26/2023    Influenza, injectable, quadrivalent, preservative free 0.5 mL 10/19/2020      Health Maintenance:         Topic Date Due    Colorectal Cancer Screening  Never done    Hepatitis C Screening  Completed         Topic Date Due    Pneumococcal Vaccine: 65+ Years (1 - PCV) Never done    DTaP,Tdap,and Td Vaccines (1 - Tdap) Never done    COVID-19 Vaccine (4 - 2023-24 season) 09/01/2023      Medicare Screening Tests and Risk Assessments:     Jovani is here for his Subsequent Wellness visit.     Health Risk Assessment:   Patient rates overall health as poor. Patient feels that their physical health rating is slightly worse. Patient is dissatisfied with their life. Eyesight was rated as same. Hearing was rated as same. Patient feels that their emotional and mental health rating is same. Patients states they are never, rarely angry. Patient states they are sometimes unusually tired/fatigued. Pain experienced in the last 7 days has been a lot. Patient's pain rating has been 9/10. Patient states that he has experienced no weight loss or gain in last 6 months. Need to find a solution to my herniated discs    Depression Screening:   PHQ-2 Score: 2      Fall Risk Screening:   In the past year, patient has  experienced: no history of falling in past year      Home Safety:  Patient does not have trouble with stairs inside or outside of their home. Patient has working smoke alarms and has no working carbon monoxide detector. Home safety hazards include: none.     Nutrition:   Current diet is Regular and Limited junk food.     Medications:   Patient is currently taking over-the-counter supplements. OTC medications include: see medication list. Patient is able to manage medications.     Activities of Daily Living (ADLs)/Instrumental Activities of Daily Living (IADLs):   Walk and transfer into and out of bed and chair?: No  Dress and groom yourself?: Yes    Bathe or shower yourself?: Yes    Feed yourself? Yes  Do your laundry/housekeeping?: Yes  Manage your money, pay your bills and track your expenses?: Yes  Make your own meals?: Yes    Do your own shopping?: Yes    ADL comments: walking and standing for long perids is difficult    Durable Medical Equipment Suppliers  ?    Previous Hospitalizations:   Any hospitalizations or ED visits within the last 12 months?: No      Advance Care Planning:   Living will: Yes    Durable POA for healthcare: Yes    Advanced directive: Yes      Comments: Patient report he has an  set up some Documentation in the past.  However he does not know specifically what documentation was; I advised patient to obtain documentation from his  for the time being daughters Michael and cousin Jacklyn are decision makers      PREVENTIVE SCREENINGS      Cardiovascular Screening:    General: Screening Not Indicated and History Lipid Disorder      Diabetes Screening:     General: Screening Not Indicated and History Diabetes      Abdominal Aortic Aneurysm (AAA) Screening:    Risk factors include: age between 65-76 yo        Lung Cancer Screening:     General: Screening Not Indicated      Hepatitis C Screening:    General: Screening Current    Screening, Brief Intervention, and Referral to Treatment  "(SBIRT)    Screening  Typical number of drinks in a day: 0  Typical number of drinks in a week: 1  Interpretation: Low risk drinking behavior.    AUDIT-C Screenin) How often did you have a drink containing alcohol in the past year? never  2) How many drinks did you have on a typical day when you were drinking in the past year? 0  3) How often did you have 6 or more drinks on one occasion in the past year? never    AUDIT-C Score: 0  Interpretation: Score 0-3 (male): Negative screen for alcohol misuse    Single Item Drug Screening:  How often have you used an illegal drug (including marijuana) or a prescription medication for non-medical reasons in the past year? never    Single Item Drug Screen Score: 0  Interpretation: Negative screen for possible drug use disorder    No results found.     Physical Exam:     /76 (BP Location: Left arm, Patient Position: Sitting, Cuff Size: Standard)   Pulse 94   Temp 98.7 °F (37.1 °C) (Temporal)   Resp 18   Ht 5' 6\" (1.676 m)   Wt 68.9 kg (151 lb 12.8 oz)   SpO2 99%   BMI 24.50 kg/m²     Physical Exam  Vitals and nursing note reviewed.   Constitutional:       General: He is not in acute distress.     Appearance: He is well-developed.   HENT:      Head: Normocephalic and atraumatic.   Eyes:      Conjunctiva/sclera: Conjunctivae normal.   Cardiovascular:      Rate and Rhythm: Normal rate and regular rhythm.      Heart sounds: No murmur heard.  Pulmonary:      Effort: Pulmonary effort is normal. No respiratory distress.      Breath sounds: Normal breath sounds.   Abdominal:      Palpations: Abdomen is soft.      Tenderness: There is no abdominal tenderness.   Musculoskeletal:         General: Tenderness present. No swelling.      Cervical back: Neck supple.      Comments: Low back pain, chronic, ambulating with 4-point cane   Skin:     General: Skin is warm and dry.      Capillary Refill: Capillary refill takes less than 2 seconds.   Neurological:      Mental Status: " He is alert.   Psychiatric:         Mood and Affect: Mood normal.          Joaquín Delacruz, DO

## 2023-12-26 NOTE — PATIENT INSTRUCTIONS
Medicare Preventive Visit Patient Instructions  Thank you for completing your Welcome to Medicare Visit or Medicare Annual Wellness Visit today. Your next wellness visit will be due in one year (12/26/2024).  The screening/preventive services that you may require over the next 5-10 years are detailed below. Some tests may not apply to you based off risk factors and/or age. Screening tests ordered at today's visit but not completed yet may show as past due. Also, please note that scanned in results may not display below.  Preventive Screenings:  Service Recommendations Previous Testing/Comments   Colorectal Cancer Screening  Colonoscopy    Fecal Occult Blood Test (FOBT)/Fecal Immunochemical Test (FIT)  Fecal DNA/Cologuard Test  Flexible Sigmoidoscopy Age: 45-75 years old   Colonoscopy: every 10 years (May be performed more frequently if at higher risk)  OR  FOBT/FIT: every 1 year  OR  Cologuard: every 3 years  OR  Sigmoidoscopy: every 5 years  Screening may be recommended earlier than age 45 if at higher risk for colorectal cancer. Also, an individualized decision between you and your healthcare provider will decide whether screening between the ages of 76-85 would be appropriate. Colonoscopy: Not on file  FOBT/FIT: Not on file  Cologuard: Not on file  Sigmoidoscopy: Not on file          Prostate Cancer Screening Individualized decision between patient and health care provider in men between ages of 55-69   Medicare will cover every 12 months beginning on the day after your 50th birthday PSA: No results in last 5 years           Hepatitis C Screening Once for adults born between 1945 and 1965  More frequently in patients at high risk for Hepatitis C Hep C Antibody: 03/02/2019    Screening Current   Diabetes Screening 1-2 times per year if you're at risk for diabetes or have pre-diabetes Fasting glucose: 190 mg/dL (3/2/2019)  A1C: 7 (9/20/2022)  Screening Not Indicated  History Diabetes   Cholesterol Screening Once  every 5 years if you don't have a lipid disorder. May order more often based on risk factors. Lipid panel: 08/19/2019  Screening Not Indicated  History Lipid Disorder      Other Preventive Screenings Covered by Medicare:  Abdominal Aortic Aneurysm (AAA) Screening: covered once if your at risk. You're considered to be at risk if you have a family history of AAA or a male between the age of 65-75 who smoking at least 100 cigarettes in your lifetime.  Lung Cancer Screening: covers low dose CT scan once per year if you meet all of the following conditions: (1) Age 55-77; (2) No signs or symptoms of lung cancer; (3) Current smoker or have quit smoking within the last 15 years; (4) You have a tobacco smoking history of at least 20 pack years (packs per day x number of years you smoked); (5) You get a written order from a healthcare provider.  Glaucoma Screening: covered annually if you're considered high risk: (1) You have diabetes OR (2) Family history of glaucoma OR (3)  aged 50 and older OR (4)  American aged 65 and older  Osteoporosis Screening: covered every 2 years if you meet one of the following conditions: (1) Have a vertebral abnormality; (2) On glucocorticoid therapy for more than 3 months; (3) Have primary hyperparathyroidism; (4) On osteoporosis medications and need to assess response to drug therapy.  HIV Screening: covered annually if you're between the age of 15-65. Also covered annually if you are younger than 15 and older than 65 with risk factors for HIV infection. For pregnant patients, it is covered up to 3 times per pregnancy.    Immunizations:  Immunization Recommendations   Influenza Vaccine Annual influenza vaccination during flu season is recommended for all persons aged >= 6 months who do not have contraindications   Pneumococcal Vaccine   * Pneumococcal conjugate vaccine = PCV13 (Prevnar 13), PCV15 (Vaxneuvance), PCV20 (Prevnar 20)  * Pneumococcal polysaccharide vaccine  = PPSV23 (Pneumovax) Adults 19-65 yo with certain risk factors or if 65+ yo  If never received any pneumonia vaccine: recommend Prevnar 20 (PCV20)  Give PCV20 if previously received 1 dose of PCV13 or PPSV23   Hepatitis B Vaccine 3 dose series if at intermediate or high risk (ex: diabetes, end stage renal disease, liver disease)   Respiratory syncytial virus (RSV) Vaccine - COVERED BY MEDICARE PART D  * RSVPreF3 (Arexvy) CDC recommends that adults 60 years of age and older may receive a single dose of RSV vaccine using shared clinical decision-making (SCDM)   Tetanus (Td) Vaccine - COST NOT COVERED BY MEDICARE PART B Following completion of primary series, a booster dose should be given every 10 years to maintain immunity against tetanus. Td may also be given as tetanus wound prophylaxis.   Tdap Vaccine - COST NOT COVERED BY MEDICARE PART B Recommended at least once for all adults. For pregnant patients, recommended with each pregnancy.   Shingles Vaccine (Shingrix) - COST NOT COVERED BY MEDICARE PART B  2 shot series recommended in those 19 years and older who have or will have weakened immune systems or those 50 years and older     Health Maintenance Due:      Topic Date Due   • Colorectal Cancer Screening  Never done   • Hepatitis C Screening  Completed     Immunizations Due:      Topic Date Due   • Pneumococcal Vaccine: 65+ Years (1 - PCV) Never done   • DTaP,Tdap,and Td Vaccines (1 - Tdap) Never done   • Influenza Vaccine (1) 09/01/2023   • COVID-19 Vaccine (4 - 2023-24 season) 09/01/2023     Advance Directives   What are advance directives?  Advance directives are legal documents that state your wishes and plans for medical care. These plans are made ahead of time in case you lose your ability to make decisions for yourself. Advance directives can apply to any medical decision, such as the treatments you want, and if you want to donate organs.   What are the types of advance directives?  There are many types  of advance directives, and each state has rules about how to use them. You may choose a combination of any of the following:  Living will:  This is a written record of the treatment you want. You can also choose which treatments you do not want, which to limit, and which to stop at a certain time. This includes surgery, medicine, IV fluid, and tube feedings.   Durable power of  for healthcare (DPAHC):  This is a written record that states who you want to make healthcare choices for you when you are unable to make them for yourself. This person, called a proxy, is usually a family member or a friend. You may choose more than 1 proxy.  Do not resuscitate (DNR) order:  A DNR order is used in case your heart stops beating or you stop breathing. It is a request not to have certain forms of treatment, such as CPR. A DNR order may be included in other types of advance directives.  Medical directive:  This covers the care that you want if you are in a coma, near death, or unable to make decisions for yourself. You can list the treatments you want for each condition. Treatment may include pain medicine, surgery, blood transfusions, dialysis, IV or tube feedings, and a ventilator (breathing machine).  Values history:  This document has questions about your views, beliefs, and how you feel and think about life. This information can help others choose the care that you would choose.  Why are advance directives important?  An advance directive helps you control your care. Although spoken wishes may be used, it is better to have your wishes written down. Spoken wishes can be misunderstood, or not followed. Treatments may be given even if you do not want them. An advance directive may make it easier for your family to make difficult choices about your care.       © Copyright [employer] 2018 Information is for End User's use only and may not be sold, redistributed or otherwise used for commercial purposes. All  illustrations and images included in CareNotes® are the copyrighted property of [employer] or [employer]    Medicare Preventive Visit Patient Instructions  Thank you for completing your Welcome to Medicare Visit or Medicare Annual Wellness Visit today. Your next wellness visit will be due in one year (12/26/2024).  The screening/preventive services that you may require over the next 5-10 years are detailed below. Some tests may not apply to you based off risk factors and/or age. Screening tests ordered at today's visit but not completed yet may show as past due. Also, please note that scanned in results may not display below.  Preventive Screenings:  Service Recommendations Previous Testing/Comments   Colorectal Cancer Screening  Colonoscopy    Fecal Occult Blood Test (FOBT)/Fecal Immunochemical Test (FIT)  Fecal DNA/Cologuard Test  Flexible Sigmoidoscopy Age: 45-75 years old   Colonoscopy: every 10 years (May be performed more frequently if at higher risk)  OR  FOBT/FIT: every 1 year  OR  Cologuard: every 3 years  OR  Sigmoidoscopy: every 5 years  Screening may be recommended earlier than age 45 if at higher risk for colorectal cancer. Also, an individualized decision between you and your healthcare provider will decide whether screening between the ages of 76-85 would be appropriate. Colonoscopy: Not on file  FOBT/FIT: Not on file  Cologuard: Not on file  Sigmoidoscopy: Not on file          Prostate Cancer Screening Individualized decision between patient and health care provider in men between ages of 55-69   Medicare will cover every 12 months beginning on the day after your 50th birthday PSA: No results in last 5 years           Hepatitis C Screening Once for adults born between 1945 and 1965  More frequently in patients at high risk for Hepatitis C Hep C Antibody: 03/02/2019    Screening Current   Diabetes Screening 1-2 times per year if you're at risk for diabetes or have pre-diabetes Fasting  glucose: 190 mg/dL (3/2/2019)  A1C: 7 (9/20/2022)  Screening Not Indicated  History Diabetes   Cholesterol Screening Once every 5 years if you don't have a lipid disorder. May order more often based on risk factors. Lipid panel: 08/19/2019  Screening Not Indicated  History Lipid Disorder      Other Preventive Screenings Covered by Medicare:  Abdominal Aortic Aneurysm (AAA) Screening: covered once if your at risk. You're considered to be at risk if you have a family history of AAA or a male between the age of 65-75 who smoking at least 100 cigarettes in your lifetime.  Lung Cancer Screening: covers low dose CT scan once per year if you meet all of the following conditions: (1) Age 55-77; (2) No signs or symptoms of lung cancer; (3) Current smoker or have quit smoking within the last 15 years; (4) You have a tobacco smoking history of at least 20 pack years (packs per day x number of years you smoked); (5) You get a written order from a healthcare provider.  Glaucoma Screening: covered annually if you're considered high risk: (1) You have diabetes OR (2) Family history of glaucoma OR (3)  aged 50 and older OR (4)  American aged 65 and older  Osteoporosis Screening: covered every 2 years if you meet one of the following conditions: (1) Have a vertebral abnormality; (2) On glucocorticoid therapy for more than 3 months; (3) Have primary hyperparathyroidism; (4) On osteoporosis medications and need to assess response to drug therapy.  HIV Screening: covered annually if you're between the age of 15-65. Also covered annually if you are younger than 15 and older than 65 with risk factors for HIV infection. For pregnant patients, it is covered up to 3 times per pregnancy.    Immunizations:  Immunization Recommendations   Influenza Vaccine Annual influenza vaccination during flu season is recommended for all persons aged >= 6 months who do not have contraindications   Pneumococcal Vaccine   *  Pneumococcal conjugate vaccine = PCV13 (Prevnar 13), PCV15 (Vaxneuvance), PCV20 (Prevnar 20)  * Pneumococcal polysaccharide vaccine = PPSV23 (Pneumovax) Adults 19-65 yo with certain risk factors or if 65+ yo  If never received any pneumonia vaccine: recommend Prevnar 20 (PCV20)  Give PCV20 if previously received 1 dose of PCV13 or PPSV23   Hepatitis B Vaccine 3 dose series if at intermediate or high risk (ex: diabetes, end stage renal disease, liver disease)   Respiratory syncytial virus (RSV) Vaccine - COVERED BY MEDICARE PART D  * RSVPreF3 (Arexvy) CDC recommends that adults 60 years of age and older may receive a single dose of RSV vaccine using shared clinical decision-making (SCDM)   Tetanus (Td) Vaccine - COST NOT COVERED BY MEDICARE PART B Following completion of primary series, a booster dose should be given every 10 years to maintain immunity against tetanus. Td may also be given as tetanus wound prophylaxis.   Tdap Vaccine - COST NOT COVERED BY MEDICARE PART B Recommended at least once for all adults. For pregnant patients, recommended with each pregnancy.   Shingles Vaccine (Shingrix) - COST NOT COVERED BY MEDICARE PART B  2 shot series recommended in those 19 years and older who have or will have weakened immune systems or those 50 years and older     Health Maintenance Due:      Topic Date Due   • Colorectal Cancer Screening  Never done   • Hepatitis C Screening  Completed     Immunizations Due:      Topic Date Due   • Pneumococcal Vaccine: 65+ Years (1 - PCV) Never done   • DTaP,Tdap,and Td Vaccines (1 - Tdap) Never done   • Influenza Vaccine (1) 09/01/2023   • COVID-19 Vaccine (4 - 2023-24 season) 09/01/2023     Advance Directives   What are advance directives?  Advance directives are legal documents that state your wishes and plans for medical care. These plans are made ahead of time in case you lose your ability to make decisions for yourself. Advance directives can apply to any medical decision,  such as the treatments you want, and if you want to donate organs.   What are the types of advance directives?  There are many types of advance directives, and each state has rules about how to use them. You may choose a combination of any of the following:  Living will:  This is a written record of the treatment you want. You can also choose which treatments you do not want, which to limit, and which to stop at a certain time. This includes surgery, medicine, IV fluid, and tube feedings.   Durable power of  for healthcare (DPAHC):  This is a written record that states who you want to make healthcare choices for you when you are unable to make them for yourself. This person, called a proxy, is usually a family member or a friend. You may choose more than 1 proxy.  Do not resuscitate (DNR) order:  A DNR order is used in case your heart stops beating or you stop breathing. It is a request not to have certain forms of treatment, such as CPR. A DNR order may be included in other types of advance directives.  Medical directive:  This covers the care that you want if you are in a coma, near death, or unable to make decisions for yourself. You can list the treatments you want for each condition. Treatment may include pain medicine, surgery, blood transfusions, dialysis, IV or tube feedings, and a ventilator (breathing machine).  Values history:  This document has questions about your views, beliefs, and how you feel and think about life. This information can help others choose the care that you would choose.  Why are advance directives important?  An advance directive helps you control your care. Although spoken wishes may be used, it is better to have your wishes written down. Spoken wishes can be misunderstood, or not followed. Treatments may be given even if you do not want them. An advance directive may make it easier for your family to make difficult choices about your care.       © Copyright [employer]  2018 Information is for End User's use only and may not be sold, redistributed or otherwise used for commercial purposes. All illustrations and images included in CareNotes® are the copyrighted property of [employer] Inc. or [employer]

## 2023-12-26 NOTE — ASSESSMENT & PLAN NOTE
Lab Results   Component Value Date    HGBA1C 6.4 12/26/2023   Well-controlled continue with current regimen of Jardiance 10 mg and glipizide 5 mg  Patient patient due for diabetic foot exam to next visit  Diabetic IRIS exam ordered

## 2023-12-26 NOTE — ASSESSMENT & PLAN NOTE
- Declined TDAP, pneumonia vacccine; interested in Flu shot   - Discussed diabetic status, HLD  - Sent blood work to assess renal function, A1C and cholesterol   - Cologuard discussion for colorectal cancer screening   - Advance directive discussed

## 2024-01-05 ENCOUNTER — DOCUMENTATION (OUTPATIENT)
Dept: FAMILY MEDICINE CLINIC | Facility: CLINIC | Age: 69
End: 2024-01-05

## 2024-01-05 ENCOUNTER — TELEPHONE (OUTPATIENT)
Dept: FAMILY MEDICINE CLINIC | Facility: CLINIC | Age: 69
End: 2024-01-05

## 2024-01-05 NOTE — TELEPHONE ENCOUNTER
Patient stated PCP had ordered a cologuard because he cannot do the colonscoph but patient is not able to use this     If possible if there is any other product    Patient can be reached at [phone]

## 2024-01-07 DIAGNOSIS — Z12.12 SCREENING FOR COLORECTAL CANCER: Primary | ICD-10-CM

## 2024-01-07 DIAGNOSIS — Z12.11 SCREENING FOR COLORECTAL CANCER: Primary | ICD-10-CM

## 2024-01-11 ENCOUNTER — APPOINTMENT (OUTPATIENT)
Dept: LAB | Facility: CLINIC | Age: 69
End: 2024-01-11
Payer: COMMERCIAL

## 2024-01-11 DIAGNOSIS — R80.9 TYPE 2 DIABETES MELLITUS WITH MICROALBUMINURIA, WITHOUT LONG-TERM CURRENT USE OF INSULIN: ICD-10-CM

## 2024-01-11 DIAGNOSIS — E11.29 TYPE 2 DIABETES MELLITUS WITH MICROALBUMINURIA, WITHOUT LONG-TERM CURRENT USE OF INSULIN: ICD-10-CM

## 2024-01-11 DIAGNOSIS — E78.5 HYPERLIPIDEMIA, UNSPECIFIED HYPERLIPIDEMIA TYPE: ICD-10-CM

## 2024-01-11 DIAGNOSIS — Z13.6 SCREENING FOR CARDIOVASCULAR CONDITION: ICD-10-CM

## 2024-01-11 LAB
CHOLEST SERPL-MCNC: 165 MG/DL
HDLC SERPL-MCNC: 51 MG/DL
LDLC SERPL CALC-MCNC: 73 MG/DL (ref 0–100)
NONHDLC SERPL-MCNC: 114 MG/DL
TRIGL SERPL-MCNC: 205 MG/DL

## 2024-01-11 PROCEDURE — 36415 COLL VENOUS BLD VENIPUNCTURE: CPT

## 2024-01-11 PROCEDURE — 80061 LIPID PANEL: CPT

## 2024-01-11 NOTE — TELEPHONE ENCOUNTER
Spoke with patient, he was able to complete the Cologuard, assured him we will be watching for results.

## 2024-01-12 ENCOUNTER — APPOINTMENT (OUTPATIENT)
Dept: LAB | Facility: CLINIC | Age: 69
End: 2024-01-12
Payer: COMMERCIAL

## 2024-01-12 DIAGNOSIS — Z12.11 SCREENING FOR COLORECTAL CANCER: ICD-10-CM

## 2024-01-12 DIAGNOSIS — Z12.12 SCREENING FOR COLORECTAL CANCER: ICD-10-CM

## 2024-01-12 LAB
CREAT UR-MCNC: 45 MG/DL
HEMOCCULT STL QL IA: NEGATIVE
MICROALBUMIN UR-MCNC: <7 MG/L
MICROALBUMIN/CREAT 24H UR: <16 MG/G CREATININE (ref 0–30)

## 2024-01-12 PROCEDURE — 82043 UR ALBUMIN QUANTITATIVE: CPT

## 2024-01-12 PROCEDURE — G0328 FECAL BLOOD SCRN IMMUNOASSAY: HCPCS

## 2024-01-12 PROCEDURE — 82570 ASSAY OF URINE CREATININE: CPT

## 2024-01-21 LAB — COLOGUARD RESULT REPORTABLE: NEGATIVE

## 2024-02-05 ENCOUNTER — TELEPHONE (OUTPATIENT)
Dept: FAMILY MEDICINE CLINIC | Facility: CLINIC | Age: 69
End: 2024-02-05

## 2024-02-05 NOTE — TELEPHONE ENCOUNTER
Patient called and left a voice mail on the clinical line just wanting us to know that he tested positive for covid-19 yesterday. Tried calling patient back no answer and could not leave a voice mail. Just XU   Referred by: Kevin Klein MD; Medical Diagnosis (from order):    Diagnosis Information      Diagnosis    V45.89 (ICD-9-CM) - Z98.890 (ICD-10-CM) - S/P arthroscopy of shoulder                Occupational Therapy -  Daily Treatment Note    Visit:  5   Next referring provider appointment: 3/30/2020        SUBJECTIVE                                                                                                             Pt reports that she is doing okay today. Client reports increased pain on Saturday and Monday because she took down her winter decorations and the weather increased her pain. Client reports a deep ache into the elbow.     Functional Change: Client is continuing to wear sling.       Pain / Symptoms:  Pain rating (out of 10): Current: 1     OBJECTIVE                                                                                                                          TREATMENT                                                                                                                  Therapeutic Exercise:  Performance of HEP - scapular retraction x 30 - ongoing tactile and verbal cues for form - much improved for the last half  AAROM into AROM; PROM elbow / forearm x 10  Manual Therapy:  In reclined:   Soft tissue massage to left upper trapezius, mid trap, deltoid, pectoralis and biceps  PROM into shoulder scaption and ER/IR 10 x 3 - oscillations used throughout range to improve relaxation  Grade I, II, joint mobilizations to her left posterior capsule  Telescoping passive range into scaption x 15    Therapeutic Activity:  Discussed episode of pain that she had.  Will address this today and if she has ongoing pain advised her to call Dr Klein tomorrow.    Skilled input: verbal instruction/cues and tactile instruction/cues    Writer verbally educated and received verbal consent for hand placement, positioning of patient, and techniques to be performed today from patient for hand  placement and palpation for techniques and therapist position for techniques as described above and how they are pertinent to the patient's plan of care.    Home Exercise Program: (*above indicates provided as part of home exercise program)   Access Code: [email or web address]   URL: [email or web address]   Date: 02/12/2020   Prepared by: Mayda Abebe      Exercises Seated Scapular Retraction - 10 reps - 2 sets - 5 hold - 3x daily - 5x weekly   Elbow Flexion PROM - 10 reps - 2 sets - 5 hold - 3x daily - 5x weekly   Forearm Supination PROM - 10 reps - 2 sets - 5 hold - 3x daily - 5x weekly   Wrist AROM Wrist Circumduction - 10 reps - 2 sets - 5 hold - 3x daily - 5x weekly   Thumb AROM Opposition To All Fingers - 10 reps - 2 sets - 5 hold - 3x daily - 5x weekly   Seated Finger MP Flexion AROM and Wrist Extension - 10 reps - 2 sets - 5 hold - 3x daily - 5x weekly   Seated Forward Lean with Arm Hang - 10 reps - 2 sets - 5 hold - 3x daily - 5x weekly         ASSESSMENT                                                                                                             Cheri has had acceptable levels of pain since her last visit.  She did have a difficult time relaxing today during passive range however and required increased time in end-ranges and oscillations throughout the range.  She did ultimately relax and was able to achieve about 40 degrees of rotation and 110 deg of scaption today.  Retraction was also performed today and had improved form with repetition and tactile cues.  Patient remains limited by pain, limited motion and weakness affecting their daily participation with ADLs and instrumental ADLs. Patient will continue to benefit from skilled Occupational Therapy to optimize active range of motion, strength, and functional use, decrease pain, increase scar tissue mobility, decrease swelling/edema, and improve muscle coordination to return to independence with ADL and Instrumental  ADL.      Pain/symptoms after session: 0  Patient Education:   Results of above outlined education: Verbalizes understanding and Demonstrates understanding       Procedures and total treatment time documented Time Entry flowsheet.

## 2024-02-21 PROBLEM — Z12.11 SCREENING FOR COLORECTAL CANCER: Status: RESOLVED | Noted: 2021-11-17 | Resolved: 2024-02-21

## 2024-02-21 PROBLEM — Z00.00 MEDICARE ANNUAL WELLNESS VISIT, SUBSEQUENT: Status: RESOLVED | Noted: 2023-12-26 | Resolved: 2024-02-21

## 2024-02-21 PROBLEM — Z12.12 SCREENING FOR COLORECTAL CANCER: Status: RESOLVED | Noted: 2021-11-17 | Resolved: 2024-02-21

## 2024-02-22 DIAGNOSIS — E78.5 HYPERLIPIDEMIA, UNSPECIFIED HYPERLIPIDEMIA TYPE: ICD-10-CM

## 2024-02-22 RX ORDER — ATORVASTATIN CALCIUM 40 MG/1
40 TABLET, FILM COATED ORAL DAILY
Qty: 90 TABLET | Refills: 2 | Status: SHIPPED | OUTPATIENT
Start: 2024-02-22

## 2024-02-25 DIAGNOSIS — E11.9 TYPE 2 DIABETES MELLITUS WITHOUT COMPLICATION, WITHOUT LONG-TERM CURRENT USE OF INSULIN (HCC): ICD-10-CM

## 2024-02-26 RX ORDER — EMPAGLIFLOZIN 10 MG/1
TABLET, FILM COATED ORAL
Qty: 90 TABLET | Refills: 3 | Status: SHIPPED | OUTPATIENT
Start: 2024-02-26

## 2024-03-07 DIAGNOSIS — E11.9 TYPE 2 DIABETES MELLITUS WITHOUT COMPLICATION, WITHOUT LONG-TERM CURRENT USE OF INSULIN (HCC): ICD-10-CM

## 2024-03-07 RX ORDER — GLIPIZIDE 5 MG/1
5 TABLET, FILM COATED, EXTENDED RELEASE ORAL DAILY
Qty: 90 TABLET | Refills: 2 | Status: SHIPPED | OUTPATIENT
Start: 2024-03-07

## 2024-03-26 ENCOUNTER — OFFICE VISIT (OUTPATIENT)
Dept: FAMILY MEDICINE CLINIC | Facility: CLINIC | Age: 69
End: 2024-03-26

## 2024-03-26 VITALS
SYSTOLIC BLOOD PRESSURE: 150 MMHG | HEART RATE: 98 BPM | BODY MASS INDEX: 24.53 KG/M2 | OXYGEN SATURATION: 99 % | TEMPERATURE: 98.1 F | RESPIRATION RATE: 20 BRPM | DIASTOLIC BLOOD PRESSURE: 75 MMHG | HEIGHT: 66 IN | WEIGHT: 152.6 LBS

## 2024-03-26 DIAGNOSIS — E11.29 TYPE 2 DIABETES MELLITUS WITH MICROALBUMINURIA, WITHOUT LONG-TERM CURRENT USE OF INSULIN (HCC): ICD-10-CM

## 2024-03-26 DIAGNOSIS — G89.29 CHRONIC BILATERAL LOW BACK PAIN WITHOUT SCIATICA: Primary | ICD-10-CM

## 2024-03-26 DIAGNOSIS — M54.50 CHRONIC BILATERAL LOW BACK PAIN WITHOUT SCIATICA: Primary | ICD-10-CM

## 2024-03-26 DIAGNOSIS — R80.9 TYPE 2 DIABETES MELLITUS WITH MICROALBUMINURIA, WITHOUT LONG-TERM CURRENT USE OF INSULIN (HCC): ICD-10-CM

## 2024-03-26 DIAGNOSIS — L60.8 LONGITUDINAL OVERCURVATURE OF NAILS: ICD-10-CM

## 2024-03-26 PROBLEM — M54.9 NOTALGIA: Status: ACTIVE | Noted: 2024-03-26

## 2024-03-26 PROBLEM — M54.40 CHRONIC LOW BACK PAIN WITH SCIATICA: Status: ACTIVE | Noted: 2024-03-26

## 2024-03-26 LAB — SL AMB POCT HEMOGLOBIN AIC: 6.7 (ref ?–6.5)

## 2024-03-26 PROCEDURE — 99213 OFFICE O/P EST LOW 20 MIN: CPT | Performed by: FAMILY MEDICINE

## 2024-03-26 PROCEDURE — 83036 HEMOGLOBIN GLYCOSYLATED A1C: CPT | Performed by: FAMILY MEDICINE

## 2024-03-26 NOTE — ASSESSMENT & PLAN NOTE
Lab Results   Component Value Date    HGBA1C 6.7 (A) 03/26/2024   - Slightly increase; still in acceptable range   Plan  - Recommend patient continue with current regimen glipizide 5 mg daily, Jardiance 10 mg daily  -Continue with 3 months follow up

## 2024-03-26 NOTE — ASSESSMENT & PLAN NOTE
-  Chronic been on going for 20 years, pt has history of herniated disc at lumbar region per previous MRI (no record)   - Pain is worsening in the past few months, worsening with prolonged standing, flexion   - Pt reported no weight change, no nocturnal back pain   Plan  - PT referral  - XR lumbar   - Tylenol and back stretch recommendation

## 2024-03-26 NOTE — PROGRESS NOTES
Name: Jovani Stratton      : 1955      MRN: [id number]  Encounter Provider: Joaquín Delacruz DO  Encounter Date: 3/26/2024   Encounter department: [facility]    Assessment & Plan     1. Chronic bilateral low back pain without sciatica  Assessment & Plan:  -  Chronic been on going for 20 years, pt has history of herniated disc at lumbar region per previous MRI (no record)   - Pain is worsening in the past few months, worsening with prolonged standing, flexion   - Pt reported no weight change, no nocturnal back pain   Plan  - PT referral  - XR lumbar   - Tylenol and back stretch recommendation     Orders:  -     XR spine lumbar 2 or 3 views injury; Future; Expected date: 2024  -     Comprehensive metabolic panel; Future  -     Ambulatory Referral to Physical Therapy; Future    2. Type 2 diabetes mellitus with microalbuminuria, without long-term current use of insulin   Assessment & Plan:    Lab Results   Component Value Date    HGBA1C 6.7 (A) 2024   - Slightly increase; still in acceptable range   Plan  - Recommend patient continue with current regimen glipizide 5 mg daily, Jardiance 10 mg daily  -Continue with 3 months follow up     Orders:  -     POCT hemoglobin A1c    3. Longitudinal overcurvature of nails  -     Ambulatory Referral to Podiatry; Future           Subjective      HPI  68 years old male present today for complaint of low back pain, as well as diabetic follow-up.  Patient reported he has chronic back pain from 120 years previously found to have a herniated disc in his lumbar region on MRI.  This MRI is not on record.  Blood pressure slightly elevated today secondary to pain.  Patient report pain is worsening with prolonged standing as well as flexion.  He denies any change in weight, no nocturnal pain.  No GI  is incontinent.  Pain associated with tightness bilaterally with the lower lumbar region, no sciatica.   Review of Systems   Constitutional:   "Negative for chills and fever.   HENT:  Negative for ear pain and sore throat.    Eyes:  Negative for pain and visual disturbance.   Respiratory:  Negative for cough and shortness of breath.    Cardiovascular:  Negative for chest pain and palpitations.   Gastrointestinal:  Negative for abdominal pain and vomiting.   Genitourinary:  Negative for dysuria and hematuria.   Musculoskeletal:  Positive for arthralgias and back pain.   Skin:  Negative for color change and rash.   Neurological:  Negative for seizures and syncope.   All other systems reviewed and are negative.      Current Outpatient Medications on File Prior to Visit   Medication Sig    atorvastatin (LIPITOR) 40 mg tablet TAKE 1 TABLET (40 MG TOTAL) BY MOUTH IN THE MORNING    azelastine (OPTIVAR) 0.05 % ophthalmic solution     Blood Glucose Monitoring Suppl (FreeStyle Lite) BATSHEVA Check fasting blood sugar every other day in the morning before breakfast.    clotrimazole-betamethasone (LOTRISONE) 1-0.05 % lotion Apply topically 2 (two) times a day    glipiZIDE (GLUCOTROL XL) 5 mg 24 hr tablet TAKE 1 TABLET (5 MG TOTAL) BY MOUTH IN THE MORNING    glucose blood (OneTouch Verio) test strip CHECK FASTING BLOOD SUGAR EVERY OTHER DAY IN THE MORNING BEFORE BREAKFAST.    Jardiance 10 MG TABS tablet TAKE 1 TABLET (10 MG TOTAL) BY MOUTH EVERY MORNING.    Lancets (freestyle) lancets Check fasting blood sugar every other day in the morning before breakfast.    lisinopril (ZESTRIL) 5 mg tablet TAKE 1 TABLET (5 MG TOTAL) BY MOUTH DAILY.       Objective     /75   Pulse 98   Temp 98.1 °F (36.7 °C) (Temporal)   Resp 20   Ht 5' 6\" (1.676 m)   Wt 69.2 kg (152 lb 9.6 oz)   SpO2 99%   BMI 24.63 kg/m²     Physical Exam  Constitutional:       General: He is not in acute distress.     Appearance: He is not ill-appearing.   HENT:      Head: Normocephalic and atraumatic.      Right Ear: There is no impacted cerumen.      Nose: No congestion.      Mouth/Throat:      Pharynx: " No oropharyngeal exudate.   Eyes:      General: No scleral icterus.  Cardiovascular:      Rate and Rhythm: Normal rate and regular rhythm.      Heart sounds: No murmur heard.  Pulmonary:      Effort: No respiratory distress.      Breath sounds: Normal breath sounds.   Abdominal:      General: There is no distension.      Palpations: Abdomen is soft.   Musculoskeletal:         General: Tenderness present. No swelling.      Comments: Bilateral lower lumbar region, T ART change   Skin:     General: Skin is warm.      Capillary Refill: Capillary refill takes less than 2 seconds.      Coloration: Skin is not jaundiced.   Neurological:      General: No focal deficit present.      Mental Status: He is alert.      Cranial Nerves: No cranial nerve deficit.      Motor: Weakness present.      Gait: Gait normal.       Joaquín Delacruz DO

## 2024-03-28 ENCOUNTER — APPOINTMENT (OUTPATIENT)
Dept: LAB | Facility: CLINIC | Age: 69
End: 2024-03-28
Payer: COMMERCIAL

## 2024-03-28 ENCOUNTER — HOSPITAL ENCOUNTER (OUTPATIENT)
Dept: RADIOLOGY | Facility: HOSPITAL | Age: 69
Discharge: HOME | End: 2024-03-28
Payer: COMMERCIAL

## 2024-03-28 DIAGNOSIS — G89.29 CHRONIC BILATERAL LOW BACK PAIN WITHOUT SCIATICA: ICD-10-CM

## 2024-03-28 DIAGNOSIS — M54.50 CHRONIC BILATERAL LOW BACK PAIN WITHOUT SCIATICA: ICD-10-CM

## 2024-03-28 LAB
ALBUMIN SERPL BCP-MCNC: 5 G/DL (ref 3.5–5)
ALP SERPL-CCNC: 59 U/L (ref 34–104)
ALT SERPL W P-5'-P-CCNC: 116 U/L (ref 7–52)
ANION GAP SERPL CALCULATED.3IONS-SCNC: 9 MMOL/L (ref 4–13)
AST SERPL W P-5'-P-CCNC: 63 U/L (ref 13–39)
BILIRUB SERPL-MCNC: 1.36 MG/DL (ref 0.2–1)
BUN SERPL-MCNC: 14 MG/DL (ref 5–25)
CALCIUM SERPL-MCNC: 9.8 MG/DL (ref 8.4–10.2)
CHLORIDE SERPL-SCNC: 97 MMOL/L (ref 96–108)
CO2 SERPL-SCNC: 30 MMOL/L (ref 21–32)
CREAT SERPL-MCNC: 0.88 MG/DL (ref 0.6–1.3)
GFR SERPL CREATININE-BSD FRML MDRD: 88 ML/MIN/1.73SQ M
GLUCOSE SERPL-MCNC: 191 MG/DL (ref 65–140)
POTASSIUM SERPL-SCNC: 3.9 MMOL/L (ref 3.5–5.3)
PROT SERPL-MCNC: 8 G/DL (ref 6.4–8.4)
SODIUM SERPL-SCNC: 136 MMOL/L (ref 135–147)

## 2024-03-28 PROCEDURE — 72100 X-RAY EXAM L-S SPINE 2/3 VWS: CPT

## 2024-03-28 PROCEDURE — 36415 COLL VENOUS BLD VENIPUNCTURE: CPT

## 2024-03-28 PROCEDURE — 80053 COMPREHEN METABOLIC PANEL: CPT

## 2024-03-29 DIAGNOSIS — M47.9 SPONDYLOSIS: Primary | ICD-10-CM

## 2024-04-10 DIAGNOSIS — M54.50 CHRONIC BILATERAL LOW BACK PAIN WITHOUT SCIATICA: Primary | ICD-10-CM

## 2024-04-10 DIAGNOSIS — G89.29 CHRONIC BILATERAL LOW BACK PAIN WITHOUT SCIATICA: Primary | ICD-10-CM

## 2024-04-11 ENCOUNTER — OFFICE VISIT (OUTPATIENT)
Dept: PODIATRY | Facility: CLINIC | Age: 69
End: 2024-04-11
Payer: COMMERCIAL

## 2024-04-11 VITALS — BODY MASS INDEX: 22.66 KG/M2 | WEIGHT: 153 LBS | HEIGHT: 69 IN

## 2024-04-11 DIAGNOSIS — E11.8 TYPE II DIABETES MELLITUS WITH COMPLICATION (HCC): Primary | ICD-10-CM

## 2024-04-11 DIAGNOSIS — B35.1 TINEA UNGUIUM: ICD-10-CM

## 2024-04-11 PROCEDURE — 99203 OFFICE O/P NEW LOW 30 MIN: CPT | Performed by: PODIATRIST

## 2024-04-11 NOTE — PROGRESS NOTES
"   PATIENT:  Jovani Stratton  1955      ASSESSMENT/PLAN:  1. Type II diabetes mellitus with complication (HCC)        2. Tinea unguium  Ambulatory Referral to Podiatry    CANCELED: Debridement            No orders of the defined types were placed in this encounter.       1. Reviewed medical records.  Educated disease prevention and risks related to diabetes.    2. Educated proper daily foot care and hygiene.  Loose skin was removed and scrubbed to remove all the dirt and skin peeling.  No ulcer in his feet.  Instructed wash his feet with soaking in soap water.  I explained that we cannot schedule him to get his feet washed because he cannot reach his feet or take a shower.  He has relatives in the area and discussed getting personal aid or help from his family for ADL.  Nails also debrided.    3. The recent blood work was reviewed / discussed and the last HbA1c was 6.7.  Continue tight BS control.     4. Pt to follow-up with Dr. Moncada for foot care.    5. The patient has low risk diabetic foot without complications and will return in 1 year for diabetic foot exam.      HPI:  Jovani Stratton is a 68 y.o.year old male referred to my office.  He says, \"my feet need to be washed\".  He has not washed his feet for about 3 years.  He cannot reach his feet due to back problem.  He cannot take a shower because he cannot stand for too long.  He does not take his shoes off either because he cannot tie his shoes.  His feet are dry and brown.  He has diabetes.  He has been seeing Dr. Moncada for toenails.  No significant numbness or paresthesia.  The patient denied any acute pedal disorder, redness, acute swelling, or recent injury.          PAST MEDICAL HISTORY:  Past Medical History:   Diagnosis Date    Candidal intertrigo 10/16/2015    Diabetes mellitus (HCC)     Thyroid nodule 10/16/2015       PAST SURGICAL HISTORY:  History reviewed. No pertinent surgical history. "     ALLERGIES:  Acetaminophen    MEDICATIONS:  Current Outpatient Medications   Medication Sig Dispense Refill    atorvastatin (LIPITOR) 40 mg tablet TAKE 1 TABLET (40 MG TOTAL) BY MOUTH IN THE MORNING 90 tablet 2    azelastine (OPTIVAR) 0.05 % ophthalmic solution       clotrimazole-betamethasone (LOTRISONE) 1-0.05 % lotion Apply topically 2 (two) times a day 30 mL 0    glipiZIDE (GLUCOTROL XL) 5 mg 24 hr tablet TAKE 1 TABLET (5 MG TOTAL) BY MOUTH IN THE MORNING 90 tablet 2    glucose blood (OneTouch Verio) test strip CHECK FASTING BLOOD SUGAR EVERY OTHER DAY IN THE MORNING BEFORE BREAKFAST. 25 strip 11    Jardiance 10 MG TABS tablet TAKE 1 TABLET (10 MG TOTAL) BY MOUTH EVERY MORNING. 90 tablet 3    lisinopril (ZESTRIL) 5 mg tablet TAKE 1 TABLET (5 MG TOTAL) BY MOUTH DAILY. 90 tablet 3    Blood Glucose Monitoring Suppl (FreeStyle Lite) BATSHEVA Check fasting blood sugar every other day in the morning before breakfast. 1 each 0    Lancets (freestyle) lancets Check fasting blood sugar every other day in the morning before breakfast. 300 each 1     No current facility-administered medications for this visit.       SOCIAL HISTORY:  Social History     Socioeconomic History    Marital status: Single     Spouse name: None    Number of children: None    Years of education: None    Highest education level: None   Occupational History    None   Tobacco Use    Smoking status: Never    Smokeless tobacco: Never   Vaping Use    Vaping status: Never Used   Substance and Sexual Activity    Alcohol use: Yes     Alcohol/week: 1.0 standard drink of alcohol     Types: 1 Cans of beer per week    Drug use: No    Sexual activity: None   Other Topics Concern    None   Social History Narrative    None     Social Determinants of Health     Financial Resource Strain: Medium Risk (12/24/2023)    Overall Financial Resource Strain (CARDIA)     Difficulty of Paying Living Expenses: Somewhat hard   Food Insecurity: Food Insecurity Present (12/24/2023)  "   Hunger Vital Sign     Worried About Running Out of Food in the Last Year: Sometimes true     Ran Out of Food in the Last Year: Never true   Transportation Needs: No Transportation Needs (12/24/2023)    PRAPARE - Transportation     Lack of Transportation (Medical): No     Lack of Transportation (Non-Medical): No   Physical Activity: Not on file   Stress: No Stress Concern Present (12/26/2023)    [employer] - Occupational Stress Questionnaire     Feeling of Stress : Not at all   Social Connections: Not on file   Intimate Partner Violence: Not At Risk (12/26/2023)    Humiliation, Afraid, Rape, and Kick questionnaire     Fear of Current or Ex-Partner: No     Emotionally Abused: No     Physically Abused: No     Sexually Abused: No   Housing Stability: Low Risk  (12/26/2023)    Housing Stability Vital Sign     Unable to Pay for Housing in the Last Year: No     Number of Places Lived in the Last Year: 1     Unstable Housing in the Last Year: No        REVIEW OF SYSTEMS:  GENERAL: No fever or chills  HEART: No chest pain, or palpitation  RESPIRATORY:  No acute SOB or cough  GI: No Nausea, vomit or diarrhea  NEUROLOGIC: No syncope or acute weakness    PHYSICAL EXAM:    Ht 5' 9\" (1.753 m)   Wt 69.4 kg (153 lb)   BMI 22.59 kg/m²     VASCULAR EXAM  Dorsalis pedis  +2, Posterior tibial artery  +2.  There is +1 lower extremity edema bilaterally.  CRT WNL.      NEUROLOGIC EXAM  Sensation is intact to light touch.  Sensation is intact to 10gm monofilament.  No focal neurologic deficit.          DERMATOLOGIC EXAM:   Plantar feet are covered with severe brown / black stains / dirt / skin grime with xerosis.  No ulcer or cellulitis noted.  The patient has hypertrophic toenails with discoloration, onycholysis, and subungal debris.      MUSCULOSKELETAL EXAM:   No acute joint pain, edema, or redness.  No acute musculoskeletal problem.        Diabetic Foot Exam    Patient's shoes and socks " removed.    Right Foot/Ankle   Right Foot Inspection  Skin Exam: dry skin. No erythema, no pre-ulcer and no ulcer.     Toe Exam: No swelling, no tenderness, erythema and  no right toe deformity    Sensory   Proprioception: intact  Monofilament testing: intact    Vascular  Capillary refills: < 3 seconds  The right DP pulse is 2+. The right PT pulse is 2+.     Left Foot/Ankle  Left Foot Inspection  Skin Exam: dry skin. No erythema, no pre-ulcer and no ulcer.     Toe Exam: No swelling, no tenderness, no erythema and no left toe deformity.     Sensory   Proprioception: intact  Monofilament testing: intact    Vascular  Capillary refills: < 3 seconds  The left DP pulse is 2+. The left PT pulse is 2+.     Assign Risk Category  No deformity present  No loss of protective sensation  No weak pulses  Risk: 0

## 2024-04-11 NOTE — LETTER
"April 12, 2024     Joaquín Delacruz DO  [address]  [place] [address]    Patient: Jovani Stratton   YOB: 1955   Date of Visit: 4/11/2024       Dear Dr. Delacruz:    Thank you for referring Jovani Stratton to me for evaluation. Below are my notes for this consultation.    If you have questions, please do not hesitate to call me. I look forward to following your patient along with you.         Sincerely,        Rajesh Badillo DPM        CC: No Recipients    Rajesh Badillo DPM  4/12/2024  9:32 AM  Sign when Signing Visit     PATIENT:  Jovani Stratton  1955      ASSESSMENT/PLAN:  1. Type II diabetes mellitus with complication (HCC)        2. Tinea unguium  Ambulatory Referral to Podiatry    Debridement            Orders Placed This Encounter   Procedures   • Debridement        1. Reviewed medical records.  Educated disease prevention and risks related to diabetes.    2. Educated proper daily foot care and hygiene.  Loose skin was removed and scrubbed to remove all the dirt and skin peeling.  No ulcer in his feet.  Instructed wash his feet with soaking in soap water.  I explained that we cannot schedule him to get his feet washed because he cannot reach his feet or take a shower.  He has relatives in the area and discussed getting personal aid or help from his family for ADL.  Nails also debrided.    3. The recent blood work was reviewed / discussed and the last HbA1c was 6.7.  Continue tight BS control.     4. Pt to follow-up with Dr. Moncada for foot care.    5. The patient has low risk diabetic foot without complications and will return in 1 year for diabetic foot exam.      HPI:  Jovani Stratton is a 68 y.o.year old male referred to my office.  He says, \"my feet need to be washed\".  He has not washed his feet for about 3 years.  He cannot reach his feet due to back problem.  He cannot take a shower because he cannot stand for too long.  He does not take his shoes off either because he cannot tie his shoes.  His feet " are dry and brown.  He has diabetes.  He has been seeing Dr. Moncada for toenails.  No significant numbness or paresthesia.  The patient denied any acute pedal disorder, redness, acute swelling, or recent injury.          PAST MEDICAL HISTORY:  Past Medical History:   Diagnosis Date   • Candidal intertrigo 10/16/2015   • Diabetes mellitus (HCC)    • Thyroid nodule 10/16/2015       PAST SURGICAL HISTORY:  History reviewed. No pertinent surgical history.     ALLERGIES:  Acetaminophen    MEDICATIONS:  Current Outpatient Medications   Medication Sig Dispense Refill   • atorvastatin (LIPITOR) 40 mg tablet TAKE 1 TABLET (40 MG TOTAL) BY MOUTH IN THE MORNING 90 tablet 2   • azelastine (OPTIVAR) 0.05 % ophthalmic solution      • clotrimazole-betamethasone (LOTRISONE) 1-0.05 % lotion Apply topically 2 (two) times a day 30 mL 0   • glipiZIDE (GLUCOTROL XL) 5 mg 24 hr tablet TAKE 1 TABLET (5 MG TOTAL) BY MOUTH IN THE MORNING 90 tablet 2   • glucose blood (OneTouch Verio) test strip CHECK FASTING BLOOD SUGAR EVERY OTHER DAY IN THE MORNING BEFORE BREAKFAST. 25 strip 11   • Jardiance 10 MG TABS tablet TAKE 1 TABLET (10 MG TOTAL) BY MOUTH EVERY MORNING. 90 tablet 3   • lisinopril (ZESTRIL) 5 mg tablet TAKE 1 TABLET (5 MG TOTAL) BY MOUTH DAILY. 90 tablet 3   • Blood Glucose Monitoring Suppl (FreeStyle Lite) BATSHEVA Check fasting blood sugar every other day in the morning before breakfast. 1 each 0   • Lancets (freestyle) lancets Check fasting blood sugar every other day in the morning before breakfast. 300 each 1     No current facility-administered medications for this visit.       SOCIAL HISTORY:  Social History     Socioeconomic History   • Marital status: Single     Spouse name: None   • Number of children: None   • Years of education: None   • Highest education level: None   Occupational History   • None   Tobacco Use   • Smoking status: Never   • Smokeless tobacco: Never   Vaping Use   • Vaping status: Never Used   Substance and  "Sexual Activity   • Alcohol use: Yes     Alcohol/week: 1.0 standard drink of alcohol     Types: 1 Cans of beer per week   • Drug use: No   • Sexual activity: None   Other Topics Concern   • None   Social History Narrative   • None     Social Determinants of Health     Financial Resource Strain: Medium Risk (12/24/2023)    Overall Financial Resource Strain (CARDIA)    • Difficulty of Paying Living Expenses: Somewhat hard   Food Insecurity: Food Insecurity Present (12/24/2023)    Hunger Vital Sign    • Worried About Running Out of Food in the Last Year: Sometimes true    • Ran Out of Food in the Last Year: Never true   Transportation Needs: No Transportation Needs (12/24/2023)    PRAPARE - Transportation    • Lack of Transportation (Medical): No    • Lack of Transportation (Non-Medical): No   Physical Activity: Not on file   Stress: No Stress Concern Present (12/26/2023)    [employer] - Occupational Stress Questionnaire    • Feeling of Stress : Not at all   Social Connections: Not on file   Intimate Partner Violence: Not At Risk (12/26/2023)    Humiliation, Afraid, Rape, and Kick questionnaire    • Fear of Current or Ex-Partner: No    • Emotionally Abused: No    • Physically Abused: No    • Sexually Abused: No   Housing Stability: Low Risk  (12/26/2023)    Housing Stability Vital Sign    • Unable to Pay for Housing in the Last Year: No    • Number of Places Lived in the Last Year: 1    • Unstable Housing in the Last Year: No        REVIEW OF SYSTEMS:  GENERAL: No fever or chills  HEART: No chest pain, or palpitation  RESPIRATORY:  No acute SOB or cough  GI: No Nausea, vomit or diarrhea  NEUROLOGIC: No syncope or acute weakness    PHYSICAL EXAM:    Ht 5' 9\" (1.753 m)   Wt 69.4 kg (153 lb)   BMI 22.59 kg/m²     VASCULAR EXAM  Dorsalis pedis  +2, Posterior tibial artery  +2.  There is +1 lower extremity edema bilaterally.  CRT WNL.      NEUROLOGIC EXAM  Sensation is intact to light touch.  " Sensation is intact to 10gm monofilament.  No focal neurologic deficit.          DERMATOLOGIC EXAM:   Plantar feet are covered with severe brown / black stains / dirt / skin grime with xerosis.  No ulcer or cellulitis noted.  The patient has hypertrophic toenails with discoloration, onycholysis, and subungal debris.      MUSCULOSKELETAL EXAM:   No acute joint pain, edema, or redness.  No acute musculoskeletal problem.        Diabetic Foot Exam    Patient's shoes and socks removed.    Right Foot/Ankle   Right Foot Inspection  Skin Exam: dry skin. No erythema, no pre-ulcer and no ulcer.     Toe Exam: No swelling, no tenderness, erythema and  no right toe deformity    Sensory   Proprioception: intact  Monofilament testing: intact    Vascular  Capillary refills: < 3 seconds  The right DP pulse is 2+. The right PT pulse is 2+.     Left Foot/Ankle  Left Foot Inspection  Skin Exam: dry skin. No erythema, no pre-ulcer and no ulcer.     Toe Exam: No swelling, no tenderness, no erythema and no left toe deformity.     Sensory   Proprioception: intact  Monofilament testing: intact    Vascular  Capillary refills: < 3 seconds  The left DP pulse is 2+. The left PT pulse is 2+.     Assign Risk Category  No deformity present  No loss of protective sensation  No weak pulses  Risk: 0

## 2024-04-22 ENCOUNTER — OFFICE VISIT (OUTPATIENT)
Dept: OBGYN CLINIC | Facility: HOSPITAL | Age: 69
End: 2024-04-22
Payer: COMMERCIAL

## 2024-04-22 ENCOUNTER — HOSPITAL ENCOUNTER (OUTPATIENT)
Dept: RADIOLOGY | Facility: HOSPITAL | Age: 69
Discharge: HOME | End: 2024-04-22
Attending: ORTHOPAEDIC SURGERY
Payer: COMMERCIAL

## 2024-04-22 VITALS
BODY MASS INDEX: 22.66 KG/M2 | SYSTOLIC BLOOD PRESSURE: 142 MMHG | WEIGHT: 153 LBS | HEIGHT: 69 IN | DIASTOLIC BLOOD PRESSURE: 83 MMHG | HEART RATE: 101 BPM

## 2024-04-22 DIAGNOSIS — R52 PAIN: ICD-10-CM

## 2024-04-22 DIAGNOSIS — G89.29 CHRONIC BILATERAL LOW BACK PAIN WITHOUT SCIATICA: ICD-10-CM

## 2024-04-22 DIAGNOSIS — M48.062 NEUROGENIC CLAUDICATION DUE TO LUMBAR SPINAL STENOSIS: Primary | ICD-10-CM

## 2024-04-22 DIAGNOSIS — M54.50 CHRONIC BILATERAL LOW BACK PAIN WITHOUT SCIATICA: ICD-10-CM

## 2024-04-22 PROCEDURE — 72100 X-RAY EXAM L-S SPINE 2/3 VWS: CPT

## 2024-04-22 PROCEDURE — 99204 OFFICE O/P NEW MOD 45 MIN: CPT | Performed by: ORTHOPAEDIC SURGERY

## 2024-04-22 NOTE — PROGRESS NOTES
Assessment & Plan/Medical Decision Makin y.o. male with Back Pain, Ambulatory Dysfunction, and Neurogenic Claudication and imaging findings most notable for lumbar spondylosis  and L5-S1 disc degeneration        The clinical, physical and imaging findings were reviewed with the patient.  Jovani  has a constellation of findings consistent with Lumbar Myofascial Pain, Ambulatory Dysfunction, and Neurogenic Claudication in the setting of lumbar degenerative disease.      Fortunately patient remains functional. Physical exam showing limitation in extension and lower extremity weakness.  We discussed the treatment options including physical therapy, at home exercises, activity modifications, chiropractic medicine, oral medications, interventional spine procedures.  At this time recommend obtaining an MRI to characterize the neural elements.    MRI lumbar spine to further evaluate patient's symptoms. Will review MRI in detail with patient at follow-up visit and discuss further treatment options at that time.    Patient instructed to return to office/ER sooner if symptoms are not improving, getting worse, or new worrisome/neurologic symptoms arise.  Patient will follow up for MRI review.     Subjective:      Chief Complaint: Back Pain    HPI:  Jovani Stratton is a 68 y.o. male presenting for initial visit with chief complaint of lower back pain for several years. Previously had a history of herniated discs in the , but his pain and radicular symptoms improved following PT at that time. He does not have radicular symptoms at this time.      Describes pain as dull and achy in nature.  Located in low back.  Denies numbness . Denies burning.  Back pain 100%, Leg pain 0%.  L > R. Pain is worse with prolonged standing and walking and improves with rest.    Denies any hussain trauma. Denies fever or chills, no night sweats. Denies any bladder or bowel changes.      Conservative therapy includes the following:    Medications: Motrin    Injections: none  Physical Therapy: in the past, nothing recent  Chiropractic Medicine: has not attempted  Accupunture/Massage Therapy: has not attempted   These therapeutic modalities were ineffective at providing sustained pain relief/functional improvement.     Nicotine dependent: No  Occupation: Retired  Living situation: Lives with family   ADLs: patient is able to perform     Objective:     History reviewed. No pertinent family history.    Past Medical History:   Diagnosis Date    Candidal intertrigo 10/16/2015    Diabetes mellitus (HCC)     Thyroid nodule 10/16/2015       Current Outpatient Medications   Medication Sig Dispense Refill    atorvastatin (LIPITOR) 40 mg tablet TAKE 1 TABLET (40 MG TOTAL) BY MOUTH IN THE MORNING 90 tablet 2    azelastine (OPTIVAR) 0.05 % ophthalmic solution       Blood Glucose Monitoring Suppl (FreeStyle Lite) BATSHEVA Check fasting blood sugar every other day in the morning before breakfast. 1 each 0    clotrimazole-betamethasone (LOTRISONE) 1-0.05 % lotion Apply topically 2 (two) times a day 30 mL 0    glipiZIDE (GLUCOTROL XL) 5 mg 24 hr tablet TAKE 1 TABLET (5 MG TOTAL) BY MOUTH IN THE MORNING 90 tablet 2    glucose blood (OneTouch Verio) test strip CHECK FASTING BLOOD SUGAR EVERY OTHER DAY IN THE MORNING BEFORE BREAKFAST. 25 strip 11    Jardiance 10 MG TABS tablet TAKE 1 TABLET (10 MG TOTAL) BY MOUTH EVERY MORNING. 90 tablet 3    Lancets (freestyle) lancets Check fasting blood sugar every other day in the morning before breakfast. 300 each 1    lisinopril (ZESTRIL) 5 mg tablet TAKE 1 TABLET (5 MG TOTAL) BY MOUTH DAILY. 90 tablet 3     No current facility-administered medications for this visit.       History reviewed. No pertinent surgical history.    Social History     Socioeconomic History    Marital status: Single     Spouse name: Not on file    Number of children: Not on file    Years of education: Not on file    Highest education level: Not on file    Occupational History    Not on file   Tobacco Use    Smoking status: Never    Smokeless tobacco: Never   Vaping Use    Vaping status: Never Used   Substance and Sexual Activity    Alcohol use: Yes     Alcohol/week: 1.0 standard drink of alcohol     Types: 1 Cans of beer per week    Drug use: No    Sexual activity: Not on file   Other Topics Concern    Not on file   Social History Narrative    Not on file     Social Determinants of Health     Financial Resource Strain: Medium Risk (12/24/2023)    Overall Financial Resource Strain (CARDIA)     Difficulty of Paying Living Expenses: Somewhat hard   Food Insecurity: Food Insecurity Present (12/24/2023)    Hunger Vital Sign     Worried About Running Out of Food in the Last Year: Sometimes true     Ran Out of Food in the Last Year: Never true   Transportation Needs: No Transportation Needs (12/24/2023)    PRAPARE - Transportation     Lack of Transportation (Medical): No     Lack of Transportation (Non-Medical): No   Physical Activity: Not on file   Stress: No Stress Concern Present (12/26/2023)    [employer] [place] [employer] - Occupational Stress Questionnaire     Feeling of Stress : Not at all   Social Connections: Not on file   Intimate Partner Violence: Not At Risk (12/26/2023)    Humiliation, Afraid, Rape, and Kick questionnaire     Fear of Current or Ex-Partner: No     Emotionally Abused: No     Physically Abused: No     Sexually Abused: No   Housing Stability: Low Risk  (12/26/2023)    Housing Stability Vital Sign     Unable to Pay for Housing in the Last Year: No     Number of Places Lived in the Last Year: 1     Unstable Housing in the Last Year: No       Allergies   Allergen Reactions    Acetaminophen Nausea Only       Review of Systems  General- denies fever/chills  HEENT- denies hearing loss or sore throat  Eyes- denies eye pain or visual disturbances, denies red eyes  Respiratory- denies cough or SOB  Cardio- denies chest pain or  "palpitations  GI- denies abdominal pain  Endocrine- denies urinary frequency  Urinary- denies pain with urination  Musculoskeletal- Negative except noted above  Skin- denies rashes or wounds  Neurological- denies dizziness or headache  Psychiatric- denies anxiety or difficulty concentrating    Physical Exam  /83   Pulse 101   Ht 5' 9\" (1.753 m)   Wt 69.4 kg (153 lb)   BMI 22.59 kg/m²     General/Constitutional: No apparent distress: well-nourished and well developed.  Lymphatic: No appreciable lymphadenopathy  Respiratory: Non-labored breathing  Vascular: No edema, swelling or tenderness, except as noted in detailed exam.  Integumentary: No impressive skin lesions present, except as noted in detailed exam.  Psych: Normal mood and affect, oriented to person, place and time.  MSK: normal other than stated in HPI and exam  Gait & balance: no evidence of myelopathic gait, ambulates with a Cane    Lumbar spine range of motion:  -Forward flexion to 90  -Extension to neutral  -Lateral bend 25 right, 25 left  -Rotation 25 right, 25 left  There tenderness with palpation along lumbar paraspinal musculature, no midline tenderness     Neurologic:    Lower Extremity Motor Function    Right  Left    Iliopsoas  5/5  5/5    Quadriceps 4/5 4/5   Tibialis anterior  4/5  4/5    EHL  4+/5  4+/5    Gastroc. muscle  5/5  5/5    Heel rise  4/5  4/5    Toe rise  4/5  4/5      Sensory: light touch is intact to bilateral upper and lower extremities     Reflexes:    Right Left   Patellar 0 1+   Achilles 0 0   Babinski neg neg     Other tests:  Straight Leg Raise: negative  Fannie SI: negative  NATALY SI: negative  Greater troch: no tenderness   Internal/external hip ROM: intact, no pain   Flexion/extension knee ROM: intact, no pain   Vascular: WWP extremities, 2+DP bilateral      Diagnostic Tests   IMAGING: I have personally reviewed the images and these are my findings:  Lumbar Spine X-rays from 3/28 and 4/22/24: multi level lumbar " "spondylosis with loss of disc height, osteophyte formation and facet hypertrophy, no apparent spondylolisthesis, no appreciated lytic/blastic lesions, no obvious instability    CT Renal Stone Study from 11/28/2019: calcified discs with lumbar spondylosis, loss of disc height, and severe central stenosis at L3-4 and L4-5.    Electronic Medical Records were reviewed including imaging studies and PMHx    Procedures, if performed today     None performed       Portions of the record may have been created with voice recognition software.  Occasional wrong word or \"sound a like\" substitutions may have occurred due to the inherent limitations of voice recognition software.  Read the chart carefully and recognize, using context, where substitutions have occurred.    "

## 2024-05-19 ENCOUNTER — HOSPITAL ENCOUNTER (OUTPATIENT)
Dept: MRI IMAGING | Facility: HOSPITAL | Age: 69
Discharge: HOME | End: 2024-05-19
Payer: COMMERCIAL

## 2024-05-19 DIAGNOSIS — M48.062 NEUROGENIC CLAUDICATION DUE TO LUMBAR SPINAL STENOSIS: ICD-10-CM

## 2024-05-19 PROCEDURE — 72148 MRI LUMBAR SPINE W/O DYE: CPT

## 2024-05-28 ENCOUNTER — OFFICE VISIT (OUTPATIENT)
Dept: OBGYN CLINIC | Facility: HOSPITAL | Age: 69
End: 2024-05-28
Payer: COMMERCIAL

## 2024-05-28 VITALS
SYSTOLIC BLOOD PRESSURE: 169 MMHG | HEIGHT: 69 IN | WEIGHT: 153 LBS | DIASTOLIC BLOOD PRESSURE: 80 MMHG | BODY MASS INDEX: 22.66 KG/M2 | HEART RATE: 97 BPM

## 2024-05-28 DIAGNOSIS — M48.062 NEUROGENIC CLAUDICATION DUE TO LUMBAR SPINAL STENOSIS: ICD-10-CM

## 2024-05-28 DIAGNOSIS — G89.29 CHRONIC BILATERAL LOW BACK PAIN WITHOUT SCIATICA: Primary | ICD-10-CM

## 2024-05-28 DIAGNOSIS — M54.50 CHRONIC BILATERAL LOW BACK PAIN WITHOUT SCIATICA: Primary | ICD-10-CM

## 2024-05-28 PROCEDURE — 99214 OFFICE O/P EST MOD 30 MIN: CPT | Performed by: ORTHOPAEDIC SURGERY

## 2024-05-28 RX ORDER — GABAPENTIN 300 MG/1
300 CAPSULE ORAL
Qty: 30 CAPSULE | Refills: 0 | Status: SHIPPED | OUTPATIENT
Start: 2024-05-28 | End: 2024-06-27

## 2024-05-28 NOTE — PROGRESS NOTES
Assessment & Plan/Medical Decision Makin y.o. male with Back Pain, Ambulatory Dysfunction, and Neurogenic Claudication and imaging findings most notable for lumbar spondylosis  and L3-4, L4-5 disc degeneration          The clinical, physical and imaging findings were reviewed with the patient.  Jovani has a constellation of findings consistent with Lumbar Myofascial Pain, Ambulatory Dysfunction, and Neurogenic Claudication in the setting of lumbar degenerative disease.      Fortunately patient remains functional. Physical exam showing limitation in extension and lower extremity weakness. We discussed the treatment options including physical therapy, at home exercises, activity modifications, chiropractic medicine, oral medications, interventional spine procedures.  We did discuss the role of surgical intervention given his severe spinal stenosis, however recommend trial of conservative treatments.      Referral placed for physical therapy to work on core strengthening, lumbar ROM, strengthening, and stretching exercises.   Referral to pain management for evaluation and treatment. Discussed potential role of steroid injection at or near the source of pain to provide targeted relief.   Gabapentin rx sent to patient's pharmacy. Discussed reasoning for prescribing medication, proper usage, and potential side effects.     Patient instructed to return to office/ER sooner if symptoms are not improving, getting worse, or new worrisome/neurologic symptoms arise.  Patient will follow up in 2 months for re-evaluation.     Subjective:      Chief Complaint: Back Pain    HPI:  Jovani Stratton is a 68 y.o. male presenting for initial visit with chief complaint of lower back pain for several years. Previously had a history of herniated discs in the , but his pain and radicular symptoms improved following PT at that time. He does not have radicular symptoms at this time.      Describes pain as dull and achy in nature.   Located in low back.  Denies numbness . Denies burning.  Back pain 100%, Leg pain 0%.  L > R. Pain is worse with prolonged standing and walking and improves with rest.    Denies any hussain trauma. Denies fever or chills, no night sweats. Denies any bladder or bowel changes.      Conservative therapy includes the following:   Medications: Motrin    Injections: none  Physical Therapy: in the past, nothing recent  Chiropractic Medicine: has not attempted  Accupunture/Massage Therapy: has not attempted   These therapeutic modalities were ineffective at providing sustained pain relief/functional improvement.     Nicotine dependent: No  Occupation: Retired  Living situation: Lives with family   ADLs: patient is able to perform     Update 5/28/24  Presents for follow up to discuss MRI results. He reports since previous appointment, his back pain is slightly worse. He denies leg pain, denies numbness or paresthesias. 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 He occasionally takes ibuprofen, but it does not relieve his symptoms.     Objective:     History reviewed. No pertinent family history.    Past Medical History:   Diagnosis Date    Candidal intertrigo 10/16/2015    Diabetes mellitus (HCC)     Thyroid nodule 10/16/2015       Current Outpatient Medications   Medication Sig Dispense Refill    atorvastatin (LIPITOR) 40 mg tablet TAKE 1 TABLET (40 MG TOTAL) BY MOUTH IN THE MORNING 90 tablet 2    azelastine (OPTIVAR) 0.05 % ophthalmic solution       Blood Glucose Monitoring Suppl (FreeStyle Lite) BATSHEVA Check fasting blood sugar every other day in the morning before breakfast. 1 each 0    clotrimazole-betamethasone (LOTRISONE) 1-0.05 % lotion Apply topically 2 (two) times a day 30 mL 0    glipiZIDE (GLUCOTROL XL) 5 mg 24 hr tablet TAKE 1 TABLET (5 MG TOTAL) BY MOUTH IN THE MORNING 90 tablet 2    glucose blood  (OneTouch Verio) test strip CHECK FASTING BLOOD SUGAR EVERY OTHER DAY IN THE MORNING BEFORE BREAKFAST. 25 strip 11    Jardiance 10 MG TABS tablet TAKE 1 TABLET (10 MG TOTAL) BY MOUTH EVERY MORNING. 90 tablet 3    Lancets (freestyle) lancets Check fasting blood sugar every other day in the morning before breakfast. 300 each 1    lisinopril (ZESTRIL) 5 mg tablet TAKE 1 TABLET (5 MG TOTAL) BY MOUTH DAILY. 90 tablet 3     No current facility-administered medications for this visit.       History reviewed. No pertinent surgical history.    Social History     Socioeconomic History    Marital status: Single     Spouse name: Not on file    Number of children: Not on file    Years of education: Not on file    Highest education level: Not on file   Occupational History    Not on file   Tobacco Use    Smoking status: Never    Smokeless tobacco: Never   Vaping Use    Vaping status: Never Used   Substance and Sexual Activity    Alcohol use: Yes     Alcohol/week: 1.0 standard drink of alcohol     Types: 1 Cans of beer per week    Drug use: No    Sexual activity: Not on file   Other Topics Concern    Not on file   Social History Narrative    Not on file     Social Determinants of Health     Financial Resource Strain: Medium Risk (12/24/2023)    Overall Financial Resource Strain (CARDIA)     Difficulty of Paying Living Expenses: Somewhat hard   Food Insecurity: Food Insecurity Present (12/24/2023)    Hunger Vital Sign     Worried About Running Out of Food in the Last Year: Sometimes true     Ran Out of Food in the Last Year: Never true   Transportation Needs: No Transportation Needs (12/24/2023)    PRAPARE - Transportation     Lack of Transportation (Medical): No     Lack of Transportation (Non-Medical): No   Physical Activity: Not on file   Stress: No Stress Concern Present (12/26/2023)    [employer] [place] [employer] - Occupational Stress Questionnaire     Feeling of Stress : Not at all   Social Connections: Not on  file   Intimate Partner Violence: Not At Risk (12/26/2023)    Humiliation, Afraid, Rape, and Kick questionnaire     Fear of Current or Ex-Partner: No     Emotionally Abused: No     Physically Abused: No     Sexually Abused: No   Housing Stability: Low Risk  (12/26/2023)    Housing Stability Vital Sign     Unable to Pay for Housing in the Last Year: No     Number of Places Lived in the Last Year: 1     Unstable Housing in the Last Year: No       Allergies   Allergen Reactions    Acetaminophen Nausea Only       Review of Systems  General- denies fever/chills  HEENT- denies hearing loss or sore throat  Eyes- denies eye pain or visual disturbances, denies red eyes  Respiratory- denies cough or SOB  Cardio- denies chest pain or palpitations  GI- denies abdominal pain  Endocrine- denies urinary frequency  Urinary- denies pain with urination  Musculoskeletal- Negative except noted above  Skin- denies rashes or wounds  Neurological- denies dizziness or headache  Psychiatric- denies anxiety or difficulty concentrating    Physical Exam  There were no vitals taken for this visit.    General/Constitutional: No apparent distress: well-nourished and well developed.  Lymphatic: No appreciable lymphadenopathy  Respiratory: Non-labored breathing  Vascular: No edema, swelling or tenderness, except as noted in detailed exam.  Integumentary: No impressive skin lesions present, except as noted in detailed exam.  Psych: Normal mood and affect, oriented to person, place and time.  MSK: normal other than stated in HPI and exam  Gait & balance: no evidence of myelopathic gait, ambulates with a Cane    Lumbar spine range of motion:  -Forward flexion to 90  -Extension to neutral  -Lateral bend 25 right, 25 left  -Rotation 25 right, 25 left  There tenderness with palpation along lumbar paraspinal musculature, no midline tenderness     Neurologic:    Lower Extremity Motor Function    Right  Left    Iliopsoas  5/5  5/5    Quadriceps 4/5 4/5  "  Tibialis anterior  4/5  4/5    EHL  4+/5  4+/5    Gastroc. muscle  5/5  5/5    Heel rise  4/5  4/5    Toe rise  4/5  4/5      Sensory: light touch is intact to bilateral upper and lower extremities     Reflexes:    Right Left   Patellar 0 1+   Achilles 0 0   Babinski neg neg     Other tests:  Straight Leg Raise: negative  Fannie SI: negative  NATALY SI: negative  Greater troch: no tenderness   Internal/external hip ROM: intact, no pain   Flexion/extension knee ROM: intact, no pain   Vascular: WWP extremities, 2+DP bilateral      Diagnostic Tests   IMAGING: I have personally reviewed the images and these are my findings:  Lumbar Spine X-rays from 3/28 and 4/22/24: multi level lumbar spondylosis with loss of disc height, osteophyte formation and facet hypertrophy, no apparent spondylolisthesis, no appreciated lytic/blastic lesions, no obvious instability    CT Renal Stone Study from 11/28/2019: calcified discs with lumbar spondylosis, loss of disc height, and severe central stenosis at L3-4 and L4-5.    Lumbar MRI from 5/19/24: multi level lumbar spondylosis with loss of disc height, osteophyte formation and facet hypertrophy, spinal stenosis at L3-4 and L4-5, no apparent spondylolisthesis, no appreciated lytic or blastic lesions, no obvious instability     Electronic Medical Records were reviewed including imaging studies and PMHx    Procedures, if performed today     None performed       Portions of the record may have been created with voice recognition software.  Occasional wrong word or \"sound a like\" substitutions may have occurred due to the inherent limitations of voice recognition software.  Read the chart carefully and recognize, using context, where substitutions have occurred.    "

## 2024-06-03 ENCOUNTER — EVALUATION (OUTPATIENT)
Dept: PHYSICAL THERAPY | Facility: CLINIC | Age: 69
End: 2024-06-03
Payer: COMMERCIAL

## 2024-06-03 DIAGNOSIS — M48.062 NEUROGENIC CLAUDICATION DUE TO LUMBAR SPINAL STENOSIS: ICD-10-CM

## 2024-06-03 DIAGNOSIS — G89.29 CHRONIC BILATERAL LOW BACK PAIN WITHOUT SCIATICA: ICD-10-CM

## 2024-06-03 DIAGNOSIS — M54.50 CHRONIC BILATERAL LOW BACK PAIN WITHOUT SCIATICA: ICD-10-CM

## 2024-06-03 PROCEDURE — 97112 NEUROMUSCULAR REEDUCATION: CPT | Performed by: PHYSICAL THERAPIST

## 2024-06-03 PROCEDURE — 97162 PT EVAL MOD COMPLEX 30 MIN: CPT | Performed by: PHYSICAL THERAPIST

## 2024-06-03 NOTE — PROGRESS NOTES
PT Evaluation     Today's date: 6/3/2024  Patient name: Jovani Stratton  : 1955  MRN: [id number]  Referring provider: Gui Brooks MD  Dx:   Encounter Diagnosis     ICD-10-CM    1. Chronic bilateral low back pain without sciatica  M54.50 Ambulatory Referral to Physical Therapy    G89.29       2. Neurogenic claudication due to lumbar spinal stenosis  M48.062 Ambulatory Referral to Physical Therapy                     Assessment  Impairments: abnormal gait, abnormal or restricted ROM, activity intolerance, impaired balance, impaired physical strength, lacks appropriate home exercise program, pain with function, weight-bearing intolerance, poor posture  and endurance  Symptom irritability: moderate    Assessment details: Pt presents with s/s consistent with a chronic lumbar derangement extension responder with significant postural abnormalities in both sitting and standing. He will benefit from skilled PT services to address his impairments in order to decrease pain and restore function.  Understanding of Dx/Px/POC: fair     Prognosis: fair    Goals  STG - 4 wks  1) pt will be able to normalize standing posture without cueing  2) pt will improve pain 50%    LTG - 8 wks  1) pt will be able to normalize sitting posture without cueing  2) pt will improve pain 75%    Plan  Patient would benefit from: skilled physical therapy    Planned therapy interventions: abdominal trunk stabilization, manual therapy, joint mobilization, neuromuscular re-education, postural training, strengthening, stretching, therapeutic activities, therapeutic exercise, home exercise program, functional ROM exercises, flexibility, gait training and body mechanics training    Frequency: 2x week  Duration in weeks: 8  Treatment plan discussed with: patient        Subjective Evaluation    History of Present Illness  Mechanism of injury: Pt is a 68 y.o. male with PMHx significant for DM II, CLBP. He reports worsening CLBP L>C radiating to  the lateral hip and anterior thigh. He reports increased LBP with walking and L hip pain with prolonged sitting with forward trunk lean and STS.    Patient Goals  Patient goals for therapy: decreased pain, increased motion, increased strength and independence with ADLs/IADLs    Pain  Current pain ratin  At best pain ratin  At worst pain ratin  Location: L>C LB, lateral hip, anterior thigh  Quality: dull ache, pressure and radiating  Relieving factors: change in position (sitting)  Aggravating factors: sitting, standing and walking  Progression: worsening      Diagnostic Tests  MRI studies: abnormal (L3-5 DDD)  Treatments  Current treatment: medication        Objective     Concurrent Complaints  Negative for night pain, disturbed sleep, bladder dysfunction, bowel dysfunction, saddle (S4) numbness, history of cancer, history of trauma and infection    Postural Observations  Seated posture: poor  Standing posture: poor    Additional Postural Observation Details  Pt demonstrates moderate forward lean in sitting and standing    Neurological Testing     Sensation     Lumbar   Left   Intact: light touch    Right   Intact: light touch    Comments   Left light touch: L1-S1  Right light touch: L1-S1    Reflexes   Left   Patellar (L4): normal (2+)  Achilles (S1): trace (1+)    Right   Patellar (L4): normal (2+)  Achilles (S1): trace (1+)    Active Range of Motion     Lumbar   Flexion:  Restriction level: minimal  Extension:  with pain Restriction level: maximal  Left lateral flexion:  with pain Restriction level: moderate  Right lateral flexion:  Restriction level: maximal  Mechanical Assessment    Cervical      Thoracic      Lumbar    Sitting flexion: repeated movements  Pain location: no change  Pain intensity: worse  Standing extension: repeated movements  Pain location: no change  Pain intensity: better  Pain level: produced    Strength/Myotome Testing     Left Hip   Planes of Motion   Flexion: 4-    Right Hip    Planes of Motion   Flexion: 4    Left Knee   Flexion: 4  Extension: 4    Right Knee   Flexion: 4+  Extension: 4+    Left Ankle/Foot   Dorsiflexion: 4+  Plantar flexion: 4-  Great toe extension: 4+    Right Ankle/Foot   Dorsiflexion: 4+  Plantar flexion: 4-  Great toe extension: 4+             Precautions: PMHx: DM II  Dx: chronic lumbar derangement extension responder, postural abnormalities    Manuals 6/3                                                                Neuro Re-Ed             Postural correction and awareness training 15 min            SJ hips, hands against table 3x10            Standing hip EXT                                                                 Ther Ex                                                                                                                     Ther Activity             STS with postural correction                          Gait Training             Gait training on floor with postural correction                          Modalities

## 2024-06-05 ENCOUNTER — OFFICE VISIT (OUTPATIENT)
Dept: PHYSICAL THERAPY | Facility: CLINIC | Age: 69
End: 2024-06-05
Payer: COMMERCIAL

## 2024-06-05 DIAGNOSIS — M54.50 CHRONIC BILATERAL LOW BACK PAIN WITHOUT SCIATICA: Primary | ICD-10-CM

## 2024-06-05 DIAGNOSIS — M48.062 NEUROGENIC CLAUDICATION DUE TO LUMBAR SPINAL STENOSIS: ICD-10-CM

## 2024-06-05 DIAGNOSIS — G89.29 CHRONIC BILATERAL LOW BACK PAIN WITHOUT SCIATICA: Primary | ICD-10-CM

## 2024-06-05 PROCEDURE — 97112 NEUROMUSCULAR REEDUCATION: CPT | Performed by: PHYSICAL THERAPIST

## 2024-06-05 PROCEDURE — 97530 THERAPEUTIC ACTIVITIES: CPT | Performed by: PHYSICAL THERAPIST

## 2024-06-05 NOTE — PROGRESS NOTES
Daily Note     Today's date: 2024  Patient name: Jovani Stratton  : 1955  MRN: [id number]  Referring provider: Gui Brooks MD  Dx:   Encounter Diagnosis     ICD-10-CM    1. Chronic bilateral low back pain without sciatica  M54.50     G89.29       2. Neurogenic claudication due to lumbar spinal stenosis  M48.062                      Subjective: Pt reports fair compliance with HEP, mildly improved LBP.    Objective: See treatment diary below    Assessment: Pt demonstrated improved sitting and standing posture.    Plan: Cont. POC.     Precautions: PMHx: DM II  Dx: chronic lumbar derangement extension responder, postural abnormalities    Manuals 6/3 6/5                                                               Neuro Re-Ed             Postural correction and awareness training 15 min 10 min           SJ hips, hands against table 3x10 3x10           Standing hip EXT  1x10 ea 2x10 ea                                                              Ther Ex                                                                                                                     Ther Activity             STS with postural correction  1x7           Standing postural correction  3x1 min                        Gait Training             Gait training on floor with postural correction  2x75 ft                        Modalities

## 2024-06-10 ENCOUNTER — OFFICE VISIT (OUTPATIENT)
Dept: PHYSICAL THERAPY | Facility: CLINIC | Age: 69
End: 2024-06-10
Payer: COMMERCIAL

## 2024-06-10 DIAGNOSIS — M54.50 CHRONIC BILATERAL LOW BACK PAIN WITHOUT SCIATICA: Primary | ICD-10-CM

## 2024-06-10 DIAGNOSIS — G89.29 CHRONIC BILATERAL LOW BACK PAIN WITHOUT SCIATICA: Primary | ICD-10-CM

## 2024-06-10 DIAGNOSIS — M48.062 NEUROGENIC CLAUDICATION DUE TO LUMBAR SPINAL STENOSIS: ICD-10-CM

## 2024-06-10 PROCEDURE — 97530 THERAPEUTIC ACTIVITIES: CPT

## 2024-06-10 PROCEDURE — 97112 NEUROMUSCULAR REEDUCATION: CPT

## 2024-06-10 NOTE — PROGRESS NOTES
"Daily Note     Today's date: 6/10/2024  Patient name: Jovani Stratton  : 1955  MRN: [id number]  Referring provider: Gui Brooks MD  Dx:   Encounter Diagnosis     ICD-10-CM    1. Chronic bilateral low back pain without sciatica  M54.50     G89.29       2. Neurogenic claudication due to lumbar spinal stenosis  M48.062           Start Time: 1400  Stop Time: 1430  Total time in clinic (min): 30 minutes    Subjective: Patient reports, \"3-4/10\" low back pain with no radicular symptoms prior to therapy. \"2/10\" pain post.     Objective: See treatment diary below    Assessment: Patient require cueing to maintain good standing posture t/o therapy. Limited lumbar extension noted. He quickly fatigues with exercises listed below. Good response to therapy thus far.     Plan: Cont. POC.     Precautions: PMHx: DM II  Dx: chronic lumbar derangement extension responder, postural abnormalities    Manuals 6/3 6/5 6/10                                                              Neuro Re-Ed             Postural correction and awareness training 15 min 10 min 10 min          SJ hips, hands against table 3x10 3x10 3x10          Standing hip EXT  1x10 ea 1x10 ea                                                              Ther Ex                                                                                                                     Ther Activity             STS with postural correction  1x7 1x10          Standing postural correction  3x1 min 3x1 min                       Gait Training             Gait training on floor with postural correction  2x75 ft 1x140 ft                       Modalities                                          "

## 2024-06-13 ENCOUNTER — OFFICE VISIT (OUTPATIENT)
Dept: PHYSICAL THERAPY | Facility: CLINIC | Age: 69
End: 2024-06-13
Payer: COMMERCIAL

## 2024-06-13 DIAGNOSIS — M54.50 CHRONIC BILATERAL LOW BACK PAIN WITHOUT SCIATICA: Primary | ICD-10-CM

## 2024-06-13 DIAGNOSIS — G89.29 CHRONIC BILATERAL LOW BACK PAIN WITHOUT SCIATICA: Primary | ICD-10-CM

## 2024-06-13 DIAGNOSIS — M48.062 NEUROGENIC CLAUDICATION DUE TO LUMBAR SPINAL STENOSIS: ICD-10-CM

## 2024-06-13 PROCEDURE — 97112 NEUROMUSCULAR REEDUCATION: CPT | Performed by: PHYSICAL THERAPIST

## 2024-06-13 NOTE — PROGRESS NOTES
Daily Note     Today's date: 2024  Patient name: Jovani Stratton  : 1955  MRN: [id number]  Referring provider: Gui Brooks MD  Dx:   Encounter Diagnosis     ICD-10-CM    1. Chronic bilateral low back pain without sciatica  M54.50     G89.29       2. Neurogenic claudication due to lumbar spinal stenosis  M48.062                      Subjective: Pt reports intermittent LBP currently, none currently. He reports poor compliance with HEP.    Objective: See treatment diary below    Assessment: Pt require mod cueing to initiate postural correction in sitting or standing but lacks necessary lumbar extension ROM to correct fully.    Plan: Cont. POC.     Precautions: PMHx: DM II  Dx: chronic lumbar derangement extension responder, postural abnormalities    Manuals 6/3 6/5 6/10 6/13                                                             Neuro Re-Ed             Postural correction and awareness training 15 min 10 min 10 min 5 min         SJ hips, hands against table 3x10 3x10 3x10 1x15  2x10         Standing hip EXT  1x10 ea 1x10 ea 2x10 ea         TB rows    R/  1x15                                                Ther Ex                                                                                                                     Ther Activity             STS with postural correction  1x7 1x10 1x10         Standing postural correction  3x1 min 3x1 min                       Gait Training             Gait training on floor with postural correction  2x75 ft 1x140 ft 140 ft                      Modalities

## 2024-06-17 ENCOUNTER — OFFICE VISIT (OUTPATIENT)
Dept: PHYSICAL THERAPY | Facility: CLINIC | Age: 69
End: 2024-06-17
Payer: COMMERCIAL

## 2024-06-17 DIAGNOSIS — M48.062 NEUROGENIC CLAUDICATION DUE TO LUMBAR SPINAL STENOSIS: ICD-10-CM

## 2024-06-17 DIAGNOSIS — M54.50 CHRONIC BILATERAL LOW BACK PAIN WITHOUT SCIATICA: Primary | ICD-10-CM

## 2024-06-17 DIAGNOSIS — G89.29 CHRONIC BILATERAL LOW BACK PAIN WITHOUT SCIATICA: Primary | ICD-10-CM

## 2024-06-17 PROCEDURE — 97530 THERAPEUTIC ACTIVITIES: CPT

## 2024-06-17 PROCEDURE — 97112 NEUROMUSCULAR REEDUCATION: CPT

## 2024-06-17 NOTE — PROGRESS NOTES
Daily Note     Today's date: 2024  Patient name: Jovani Stratton  : 1955  MRN: [id number]  Referring provider: Gui Brooks MD  Dx:   Encounter Diagnosis     ICD-10-CM    1. Chronic bilateral low back pain without sciatica  M54.50     G89.29       2. Neurogenic claudication due to lumbar spinal stenosis  M48.062                      Subjective: Pt reports 6/10 low back pain after having to do some work earlier today that required him to bend forward to look under shelves. Pt reports fair HEP compliance, 2-3 times per day.    Objective: See treatment diary below    Assessment: Pt demonstrated poor postural awareness but he was able to correct more effectively following SJ. Pt was instructed to perform SJ at increased frequency to 5x/day. Pain levels decreased to 3-4/10 post tx.     Plan: Cont. POC.     Precautions: PMHx: DM II  Dx: chronic lumbar derangement extension responder, postural abnormalities    Manuals 6/3 6/5 6/10 6/13 6/17                                                            Neuro Re-Ed             Postural correction and awareness training 15 min 10 min 10 min 5 min 5 min        SJ hips, hands against table 3x10 3x10 3x10 1x15  2x10 4x10        Standing hip EXT  1x10 ea 1x10 ea 2x10 ea 2x10 ea        TB rows    R/  1x15 R/   1x20                                               Ther Ex                                                                                                                     Ther Activity             STS with postural correction  1x7 1x10 1x10 1x10  1x5        Standing postural correction  3x1 min 3x1 min                       Gait Training             Gait training on floor with postural correction  2x75 ft 1x140 ft 140 ft 150 ft                     Modalities

## 2024-06-20 ENCOUNTER — OFFICE VISIT (OUTPATIENT)
Dept: PHYSICAL THERAPY | Facility: CLINIC | Age: 69
End: 2024-06-20
Payer: COMMERCIAL

## 2024-06-20 DIAGNOSIS — M54.50 CHRONIC BILATERAL LOW BACK PAIN WITHOUT SCIATICA: Primary | ICD-10-CM

## 2024-06-20 DIAGNOSIS — M48.062 NEUROGENIC CLAUDICATION DUE TO LUMBAR SPINAL STENOSIS: ICD-10-CM

## 2024-06-20 DIAGNOSIS — G89.29 CHRONIC BILATERAL LOW BACK PAIN WITHOUT SCIATICA: Primary | ICD-10-CM

## 2024-06-20 PROCEDURE — 97530 THERAPEUTIC ACTIVITIES: CPT | Performed by: PHYSICAL THERAPIST

## 2024-06-20 PROCEDURE — 97112 NEUROMUSCULAR REEDUCATION: CPT | Performed by: PHYSICAL THERAPIST

## 2024-06-20 NOTE — PROGRESS NOTES
Daily Note     Today's date: 2024  Patient name: Jovani Stratton  : 1955  MRN: [id number]  Referring provider: Gui Brooks MD  Dx:   Encounter Diagnosis     ICD-10-CM    1. Chronic bilateral low back pain without sciatica  M54.50     G89.29       2. Neurogenic claudication due to lumbar spinal stenosis  M48.062                      Subjective: Pt reports fair compliance with HEP, 3/10 LBP currently. He reports LBP and moderate fatigue when moving clothes from the washer to the dryer immediately to the left. He reports a cluttered floor in front of the dryer that he cannot tidy up and unable to place a basket on top of the dryer to be able to move clothes step-wise to  the dryer.    Objective: See treatment diary below    Assessment: Pt demonstrated improved postural awareness but still unable to extend to neutral in standing, and resistant to suggestions for household modifications to facilitate better lifting mechanics.    Plan: Cont. POC.     Precautions: PMHx: DM II  Dx: chronic lumbar derangement extension responder, postural abnormalities    Manuals 6/3 6/5 6/10 6/13 6/17 6/20                                                           Neuro Re-Ed             Postural correction and awareness training 15 min 10 min 10 min 5 min 5 min 5 min       SJ hips, hands against table 3x10 3x10 3x10 1x15  2x10 4x10 3x10  1x10       Standing OH reaching      1x15 ea       Standing hip EXT  1x10 ea 1x10 ea 2x10 ea 2x10 ea 2x12 ea       TB rows    R/  1x15 R/   1x20        Slouch-overcorrect seated      1x20                                 Ther Ex                                                                                                                     Ther Activity             STS with postural correction  1x7 1x10 1x10 1x10  1x5 2x10       Standing postural correction  3x1 min 3x1 min                       Gait Training             Gait training on floor with postural correction  2x75 ft 1x140  ft 140 ft 150 ft 150 ft                    Modalities

## 2024-06-24 ENCOUNTER — OFFICE VISIT (OUTPATIENT)
Dept: PHYSICAL THERAPY | Facility: CLINIC | Age: 69
End: 2024-06-24
Payer: COMMERCIAL

## 2024-06-24 ENCOUNTER — TELEPHONE (OUTPATIENT)
Age: 69
End: 2024-06-24

## 2024-06-24 DIAGNOSIS — M48.062 NEUROGENIC CLAUDICATION DUE TO LUMBAR SPINAL STENOSIS: ICD-10-CM

## 2024-06-24 DIAGNOSIS — G89.29 CHRONIC BILATERAL LOW BACK PAIN WITHOUT SCIATICA: ICD-10-CM

## 2024-06-24 DIAGNOSIS — M54.50 CHRONIC BILATERAL LOW BACK PAIN WITHOUT SCIATICA: Primary | ICD-10-CM

## 2024-06-24 DIAGNOSIS — M54.50 CHRONIC BILATERAL LOW BACK PAIN WITHOUT SCIATICA: ICD-10-CM

## 2024-06-24 DIAGNOSIS — G89.29 CHRONIC BILATERAL LOW BACK PAIN WITHOUT SCIATICA: Primary | ICD-10-CM

## 2024-06-24 PROCEDURE — 97530 THERAPEUTIC ACTIVITIES: CPT

## 2024-06-24 PROCEDURE — 97112 NEUROMUSCULAR REEDUCATION: CPT

## 2024-06-24 RX ORDER — GABAPENTIN 300 MG/1
300 CAPSULE ORAL
Qty: 30 CAPSULE | Refills: 0 | Status: SHIPPED | OUTPATIENT
Start: 2024-06-24 | End: 2024-07-24

## 2024-06-24 NOTE — TELEPHONE ENCOUNTER
Caller: Patient    Doctor: Todd    Reason for call: Patient states no major side effects.  No dizziness but feels like he just needs to hold on for balance sometimes not all the time.  Wishes to proceed with a refill and will continue with PT.    [facility]    Call back#: [phone]

## 2024-06-24 NOTE — PROGRESS NOTES
Daily Note     Today's date: 2024  Patient name: Jovani Stratton  : 1955  MRN: [id number]  Referring provider: Gui Brooks MD  Dx:   Encounter Diagnosis     ICD-10-CM    1. Chronic bilateral low back pain without sciatica  M54.50     G89.29       2. Neurogenic claudication due to lumbar spinal stenosis  M48.062                      Subjective: Pt reports 3-4/10 left-sided low back pain currently. Pt reports he fell in his basement 3 days ago trying to step over something without being able to hold onto anything for support. Pt denies hurting anything or hitting his head. Pt reports fair HEP compliance.    Objective: See treatment diary below    Assessment: Pt demonstrated difficulty achieving a neutral spine during TE program. Pt demonstrated moderate decreased low back extension ROM and a slight increase in left-sided low back pain following treatment.     Plan: Cont. POC.     Precautions: PMHx: DM II  Dx: chronic lumbar derangement extension responder, postural abnormalities    Manuals 6/3 6/5 6/10 6/13 6/17 6/20 6/24                                                          Neuro Re-Ed             Postural correction and awareness training 15 min 10 min 10 min 5 min 5 min 5 min 2 min      SJ hips, hands against table 3x10 3x10 3x10 1x15  2x10 4x10 3x10  1x10 3x10  1x10      Standing OH reaching      1x15 ea 1x15       Standing hip EXT  1x10 ea 1x10 ea 2x10 ea 2x10 ea 2x12 ea 2x12 ea      TB rows    R/  1x15 R/   1x20        Slouch-overcorrect seated      1x20 1x20                                Ther Ex                                                                                                                     Ther Activity             STS with postural correction  1x7 1x10 1x10 1x10  1x5 2x10 2x10      Standing postural correction  3x1 min 3x1 min    2x1 min                   Gait Training             Gait training on floor with postural correction  2x75 ft 1x140 ft 140 ft 150 ft 150  ft                    Modalities

## 2024-06-24 NOTE — TELEPHONE ENCOUNTER
[facility]  [address]  [facility] [address]  Phone: [phone]    01/16/18    Divina Delgadillo  [address]  [place] [address]      To whom it may concern:     Divina was treated in clinic today. Please excuse her for missed work 1/15/18-1/16/18. She may return tomorrow without restrictions.    Sincerely,      VERONIQUE Marion CNP             Caller: Patient    Doctor: Todd    Reason for call: Patient is calling to find out if he should continue his PT and medication until his 2 month follow up or does he stop them because he was only given a month    Call back#: [phone]

## 2024-06-25 ENCOUNTER — OFFICE VISIT (OUTPATIENT)
Dept: FAMILY MEDICINE CLINIC | Facility: CLINIC | Age: 69
End: 2024-06-25

## 2024-06-25 ENCOUNTER — TELEPHONE (OUTPATIENT)
Age: 69
End: 2024-06-25

## 2024-06-25 VITALS
BODY MASS INDEX: 22.36 KG/M2 | HEIGHT: 69 IN | SYSTOLIC BLOOD PRESSURE: 113 MMHG | DIASTOLIC BLOOD PRESSURE: 69 MMHG | WEIGHT: 151 LBS | RESPIRATION RATE: 20 BRPM | HEART RATE: 92 BPM | TEMPERATURE: 98.4 F | OXYGEN SATURATION: 98 %

## 2024-06-25 DIAGNOSIS — M54.50 CHRONIC BILATERAL LOW BACK PAIN WITHOUT SCIATICA: Primary | ICD-10-CM

## 2024-06-25 DIAGNOSIS — G89.29 CHRONIC BILATERAL LOW BACK PAIN WITHOUT SCIATICA: Primary | ICD-10-CM

## 2024-06-25 PROCEDURE — 99213 OFFICE O/P EST LOW 20 MIN: CPT | Performed by: FAMILY MEDICINE

## 2024-06-25 PROCEDURE — G2211 COMPLEX E/M VISIT ADD ON: HCPCS | Performed by: FAMILY MEDICINE

## 2024-06-25 NOTE — PROGRESS NOTES
"Ambulatory Visit  Name: Jovani Stratton      : 1955      MRN: [id number]  Encounter Provider: Joaquín Delacruz DO  Encounter Date: 2024   Encounter department: [facility]    Assessment & Plan   1. Chronic bilateral low back pain without sciatica  Assessment & Plan:  - Patient has chronic back pain secondary to multiple level spondylosis  -He reports he has episode of fall few days ago, no loss of consciousness no head strike.  -Patient reported he tripped on a wire and fell  -Patient still following with PT  Plan  -Encouraging patient to continue his complying with PT follow-up  -Will follow-up with patient in 6 months       History of Present Illness     HPI  68 years old gentleman with history of chronic low back pain present today to follow-up on his back pain symptoms.  He reported he felt PT to be helpful to control the pain.  He is eating well, no night sweats as well as drastic weight loss in the past few years.  He intermittently taken Tylenol for pain however he is committed to following through with PT treatment.   Review of Systems   Constitutional:  Negative for chills and fever.   HENT:  Negative for ear pain and sore throat.    Eyes:  Negative for pain and visual disturbance.   Respiratory:  Negative for cough and shortness of breath.    Cardiovascular:  Negative for chest pain and palpitations.   Gastrointestinal:  Negative for abdominal pain and vomiting.   Genitourinary:  Negative for dysuria and hematuria.   Musculoskeletal:  Positive for back pain. Negative for arthralgias.   Skin:  Negative for color change and rash.   Neurological:  Negative for seizures and syncope.   All other systems reviewed and are negative.      Objective     /69   Pulse 92   Temp 98.4 °F (36.9 °C) (Temporal)   Resp 20   Ht 5' 9\" (1.753 m)   Wt 68.5 kg (151 lb)   SpO2 98%   BMI 22.30 kg/m²     Physical Exam  Vitals and nursing note reviewed.   Constitutional:       " General: He is not in acute distress.     Appearance: He is well-developed.   HENT:      Head: Normocephalic and atraumatic.   Eyes:      Conjunctiva/sclera: Conjunctivae normal.   Cardiovascular:      Rate and Rhythm: Normal rate and regular rhythm.      Heart sounds: No murmur heard.  Pulmonary:      Effort: Pulmonary effort is normal. No respiratory distress.      Breath sounds: Normal breath sounds.   Abdominal:      Palpations: Abdomen is soft.      Tenderness: There is no abdominal tenderness.   Musculoskeletal:         General: Tenderness present. No swelling.      Cervical back: Neck supple.      Comments: Low back around L4-S1 region bilaterally, no sciatica   Skin:     General: Skin is warm and dry.      Capillary Refill: Capillary refill takes less than 2 seconds.   Neurological:      Mental Status: He is alert.   Psychiatric:         Mood and Affect: Mood normal.       Administrative Statements

## 2024-06-25 NOTE — TELEPHONE ENCOUNTER
Caller: Jovani     Doctor: Todd    Reason for call:Returning a missed call from earlier today     Call back#: [phone]

## 2024-06-25 NOTE — ASSESSMENT & PLAN NOTE
- Patient has chronic back pain secondary to multiple level spondylosis  -He reports he has episode of fall few days ago, no loss of consciousness no head strike.  -Patient reported he tripped on a wire and fell  -Patient still following with PT  Plan  -Encouraging patient to continue his complying with PT follow-up  -Will follow-up with patient in 6 months

## 2024-06-26 NOTE — TELEPHONE ENCOUNTER
Caller: Patient    Doctor: Todd    Reason for call: Patient calling back.  Relayed message.  Patient understands.    Call back#: n/a

## 2024-06-26 NOTE — TELEPHONE ENCOUNTER
There are two encounters open, please see previous encounter w/JOVANA Del Toro's msg that needs to be relayed to pt. CLM on VM to relay that msg to pt this AM and am awaiting CB.

## 2024-06-27 ENCOUNTER — OFFICE VISIT (OUTPATIENT)
Dept: PHYSICAL THERAPY | Facility: CLINIC | Age: 69
End: 2024-06-27
Payer: COMMERCIAL

## 2024-06-27 DIAGNOSIS — M54.50 CHRONIC BILATERAL LOW BACK PAIN WITHOUT SCIATICA: Primary | ICD-10-CM

## 2024-06-27 DIAGNOSIS — G89.29 CHRONIC BILATERAL LOW BACK PAIN WITHOUT SCIATICA: Primary | ICD-10-CM

## 2024-06-27 DIAGNOSIS — M48.062 NEUROGENIC CLAUDICATION DUE TO LUMBAR SPINAL STENOSIS: ICD-10-CM

## 2024-06-27 PROCEDURE — 97112 NEUROMUSCULAR REEDUCATION: CPT | Performed by: PHYSICAL THERAPIST

## 2024-06-27 NOTE — PROGRESS NOTES
Daily Note     Today's date: 2024  Patient name: Jovani Stratton  : 1955  MRN: [id number]  Referring provider: Gui Brooks MD  Dx:   Encounter Diagnosis     ICD-10-CM    1. Chronic bilateral low back pain without sciatica  M54.50     G89.29       2. Neurogenic claudication due to lumbar spinal stenosis  M48.062                      Subjective: Pt reports less pain and difficulty getting out of the car but now with sharp pain when returning to baseline standing posture from extension end ROM x 2 wks s/p fall.    Objective: See treatment diary below    Assessment: Pt is now able to achieve a neutral spine actively in standing after SJ but lacks sufficient neuromuscular control to meet his ROM gains.    Plan: Cont. POC. Progress core stab as tolerated.     Precautions: PMHx: DM II  Dx: chronic lumbar derangement extension responder, postural abnormalities    Manuals 6/3 6/5 6/10 6/13 6/17 6/20 6/24 6/27                                                         Neuro Re-Ed             Postural correction and awareness training 15 min 10 min 10 min 5 min 5 min 5 min 2 min 2 min     SJ hips, hands against table 3x10 3x10 3x10 1x15  2x10 4x10 3x10  1x10 3x10  1x10 1x20     Standing OH reaching      1x15 ea 1x15  1x20 ea     Standing hip EXT  1x10 ea 1x10 ea 2x10 ea 2x10 ea 2x12 ea 2x12 ea 2x10 ea     TB rows    R/  1x15 R/   1x20        Slouch-overcorrect seated      1x20 1x20 1x20                               Ther Ex                                                                                                                     Ther Activity             STS with postural correction  1x7 1x10 1x10 1x10  1x5 2x10 2x10 1x20     Standing postural correction  3x1 min 3x1 min    2x1 min                   Gait Training             Gait training on floor with postural correction  2x75 ft 1x140 ft 140 ft 150 ft 150 ft  75 ft                  Modalities

## 2024-07-01 ENCOUNTER — OFFICE VISIT (OUTPATIENT)
Dept: PHYSICAL THERAPY | Facility: CLINIC | Age: 69
End: 2024-07-01
Payer: COMMERCIAL

## 2024-07-01 DIAGNOSIS — M54.50 CHRONIC BILATERAL LOW BACK PAIN WITHOUT SCIATICA: Primary | ICD-10-CM

## 2024-07-01 DIAGNOSIS — G89.29 CHRONIC BILATERAL LOW BACK PAIN WITHOUT SCIATICA: Primary | ICD-10-CM

## 2024-07-01 DIAGNOSIS — M48.062 NEUROGENIC CLAUDICATION DUE TO LUMBAR SPINAL STENOSIS: ICD-10-CM

## 2024-07-01 PROCEDURE — 97112 NEUROMUSCULAR REEDUCATION: CPT

## 2024-07-01 NOTE — PROGRESS NOTES
Daily Note     Today's date: 2024  Patient name: Jovani Stratton  : 1955  MRN: [id number]  Referring provider: Gui Brooks MD  Dx:   Encounter Diagnosis     ICD-10-CM    1. Chronic bilateral low back pain without sciatica  M54.50     G89.29       2. Neurogenic claudication due to lumbar spinal stenosis  M48.062                      Subjective: Pt reports good compliance with HEP over the weekend until he experienced an episode of increased sharp low back pain with bending forward today. Pt reports 3/10 left-sided low back pain pre tx.     Objective: See treatment diary below    Assessment: Pt demonstrated increased tolerance to core stabilization and no episodes of sharp low back pain post tx.      Plan: Cont. POC. Progress core stab as tolerated.     Precautions: PMHx: DM II  Dx: chronic lumbar derangement extension responder, postural abnormalities    Manuals 6/3 6/5 6/10 6/13 6/17 6/20 6/24 6/27 7/1                                                        Neuro Re-Ed             Postural correction and awareness training 15 min 10 min 10 min 5 min 5 min 5 min 2 min 2 min 2 min    SJ hips, hands against table 3x10 3x10 3x10 1x15  2x10 4x10 3x10  1x10 3x10  1x10 1x20 1x20    Standing OH reaching      1x15 ea 1x15  1x20 ea 1x20    Standing hip EXT  1x10 ea 1x10 ea 2x10 ea 2x10 ea 2x12 ea 2x12 ea 2x10 ea 2x10 ea    TB rows    R/  1x15 R/   1x20        Slouch-overcorrect seated      1x20 1x20 1x20 1x20                              Ther Ex                                                                                                                     Ther Activity             STS with postural correction  1x7 1x10 1x10 1x10  1x5 2x10 2x10 1x20 1x25    Standing postural correction  3x1 min 3x1 min    2x1 min                   Gait Training             Gait training on floor with postural correction  2x75 ft 1x140 ft 140 ft 150 ft 150 ft  75 ft 75 ft                 Modalities

## 2024-07-05 ENCOUNTER — OFFICE VISIT (OUTPATIENT)
Dept: PHYSICAL THERAPY | Facility: CLINIC | Age: 69
End: 2024-07-05
Payer: COMMERCIAL

## 2024-07-05 DIAGNOSIS — G89.29 CHRONIC BILATERAL LOW BACK PAIN WITHOUT SCIATICA: Primary | ICD-10-CM

## 2024-07-05 DIAGNOSIS — M54.50 CHRONIC BILATERAL LOW BACK PAIN WITHOUT SCIATICA: Primary | ICD-10-CM

## 2024-07-05 DIAGNOSIS — M48.062 NEUROGENIC CLAUDICATION DUE TO LUMBAR SPINAL STENOSIS: ICD-10-CM

## 2024-07-05 PROCEDURE — 97112 NEUROMUSCULAR REEDUCATION: CPT | Performed by: PHYSICAL THERAPIST

## 2024-07-05 PROCEDURE — 97530 THERAPEUTIC ACTIVITIES: CPT | Performed by: PHYSICAL THERAPIST

## 2024-07-05 NOTE — PROGRESS NOTES
Daily Note     Today's date: 2024  Patient name: Jovani Stratton  : 1955  MRN: [id number]  Referring provider: Gui Brooks MD  Dx:   Encounter Diagnosis     ICD-10-CM    1. Chronic bilateral low back pain without sciatica  M54.50     G89.29       2. Neurogenic claudication due to lumbar spinal stenosis  M48.062                      Subjective: Pt reports L LBP with sitting.    Objective: See treatment diary below    SJ P,B (improved extension ROM, pain with postural correction)    Assessment: Pt demonstrated no pain with a neutral spine in prone lying but could not progress ROM in prone 2/2 UE weakness.    Plan: Cont. POC.     Precautions: PMHx: DM II  Dx: chronic lumbar derangement extension responder, postural abnormalities    Manuals 6/3 6/5 6/10 6/13 6/17 6/20 6/24 6/27 7/1 7/5                                                       Neuro Re-Ed             Postural correction and awareness training 15 min 10 min 10 min 5 min 5 min 5 min 2 min 2 min 2 min    Prone lying          2 pillows  2 min    1 pillow  2 min    Neutral  2 min   SJ hips, hands against table 3x10 3x10 3x10 1x15  2x10 4x10 3x10  1x10 3x10  1x10 1x20 1x20 2x15   bridges          1x15  1x10    (3x10 nv)   Standing OH reaching      1x15 ea 1x15  1x20 ea 1x20    Standing hip EXT  1x10 ea 1x10 ea 2x10 ea 2x10 ea 2x12 ea 2x12 ea 2x10 ea 2x10 ea    TB rows    R/  1x15 R/   1x20        Slouch-overcorrect seated      1x20 1x20 1x20 1x20 1x25                             Ther Ex                                                                                                                     Ther Activity             STS with postural correction  1x7 1x10 1x10 1x10  1x5 2x10 2x10 1x20 1x25 1x20   Standing postural correction  3x1 min 3x1 min    2x1 min                   Gait Training             Gait training on floor with postural correction  2x75 ft 1x140 ft 140 ft 150 ft 150 ft  75 ft 75 ft 75 ft                Modalities

## 2024-07-08 ENCOUNTER — OFFICE VISIT (OUTPATIENT)
Dept: PHYSICAL THERAPY | Facility: CLINIC | Age: 69
End: 2024-07-08
Payer: COMMERCIAL

## 2024-07-08 DIAGNOSIS — M48.062 NEUROGENIC CLAUDICATION DUE TO LUMBAR SPINAL STENOSIS: ICD-10-CM

## 2024-07-08 DIAGNOSIS — G89.29 CHRONIC BILATERAL LOW BACK PAIN WITHOUT SCIATICA: Primary | ICD-10-CM

## 2024-07-08 DIAGNOSIS — M54.50 CHRONIC BILATERAL LOW BACK PAIN WITHOUT SCIATICA: Primary | ICD-10-CM

## 2024-07-08 PROCEDURE — 97530 THERAPEUTIC ACTIVITIES: CPT

## 2024-07-08 PROCEDURE — 97112 NEUROMUSCULAR REEDUCATION: CPT

## 2024-07-08 NOTE — PROGRESS NOTES
Daily Note     Today's date: 2024  Patient name: Jovani Stratton  : 1955  MRN: [id number]  Referring provider: Gui Brooks MD  Dx:   Encounter Diagnosis     ICD-10-CM    1. Chronic bilateral low back pain without sciatica  M54.50     G89.29       2. Neurogenic claudication due to lumbar spinal stenosis  M48.062           Start Time: 1400  Stop Time: 1440  Total time in clinic (min): 40 minutes    Subjective: Patient reports having minimal pain prior to therapy.     Objective: See treatment diary below    SJ P,B (improved extension ROM, pain with postural correction)    Assessment: Patient demonstrates good form with moderate cueing required. Continued SJ secondary to a better response.     Plan: Cont. POC.     Precautions: PMHx: DM II  Dx: chronic lumbar derangement extension responder, postural abnormalities    Manuals                                                        Neuro Re-Ed             Postural correction and awareness training      5 min 2 min 2 min 2 min    Prone lying          2 pillows  2 min    1 pillow  2 min    Neutral  2 min   SJ hips, hands against table 2x15     3x10  1x10 3x10  1x10 1x20 1x20 2x15   bridges 3x10         1x15  1x10    (3x10 nv)   Standing OH reaching      1x15 ea 1x15  1x20 ea 1x20    Standing hip EXT 2x10 ea     2x12 ea 2x12 ea 2x10 ea 2x10 ea    TB rows             Slouch-overcorrect seated 1x25     1x20 1x20 1x20 1x20 1x25                             Ther Ex                                                                                                                     Ther Activity             STS with postural correction 1x20     2x10 2x10 1x20 1x25 1x20   Standing postural correction       2x1 min                   Gait Training             Gait training on floor with postural correction 1x75 ft     150 ft  75 ft 75 ft 75 ft                Modalities

## 2024-07-11 ENCOUNTER — OFFICE VISIT (OUTPATIENT)
Dept: PHYSICAL THERAPY | Facility: CLINIC | Age: 69
End: 2024-07-11
Payer: COMMERCIAL

## 2024-07-11 DIAGNOSIS — G89.29 CHRONIC BILATERAL LOW BACK PAIN WITHOUT SCIATICA: Primary | ICD-10-CM

## 2024-07-11 DIAGNOSIS — M48.062 NEUROGENIC CLAUDICATION DUE TO LUMBAR SPINAL STENOSIS: ICD-10-CM

## 2024-07-11 DIAGNOSIS — M54.50 CHRONIC BILATERAL LOW BACK PAIN WITHOUT SCIATICA: Primary | ICD-10-CM

## 2024-07-11 PROCEDURE — 97112 NEUROMUSCULAR REEDUCATION: CPT | Performed by: PHYSICAL THERAPIST

## 2024-07-15 ENCOUNTER — OFFICE VISIT (OUTPATIENT)
Dept: PHYSICAL THERAPY | Facility: CLINIC | Age: 69
End: 2024-07-15
Payer: COMMERCIAL

## 2024-07-15 DIAGNOSIS — M54.50 CHRONIC BILATERAL LOW BACK PAIN WITHOUT SCIATICA: Primary | ICD-10-CM

## 2024-07-15 DIAGNOSIS — M48.062 NEUROGENIC CLAUDICATION DUE TO LUMBAR SPINAL STENOSIS: ICD-10-CM

## 2024-07-15 DIAGNOSIS — G89.29 CHRONIC BILATERAL LOW BACK PAIN WITHOUT SCIATICA: Primary | ICD-10-CM

## 2024-07-15 PROCEDURE — 97112 NEUROMUSCULAR REEDUCATION: CPT

## 2024-07-15 NOTE — PROGRESS NOTES
"Daily Note     Today's date: 7/15/2024  Patient name: Jovani Stratton  : 1955  MRN: [id number]  Referring provider: Gui Brooks MD  Dx:   Encounter Diagnosis     ICD-10-CM    1. Chronic bilateral low back pain without sciatica  M54.50     G89.29       2. Neurogenic claudication due to lumbar spinal stenosis  M48.062           Start Time: 1400  Stop Time: 1440  Total time in clinic (min): 40 minutes    Subjective: Patient reports, \"3/10\" low back pain prior to therapy. He notes occasionally having LLE symptoms after sitting for a while at his computer.     Objective: See treatment diary below    Assessment: Patient required cueing to maintain an upright posture while ambulating and completing STS's. Limited lumbar ROM. Continue to progress as able.     Plan: Cont. POC.     Precautions: PMHx: DM II  Dx: chronic lumbar derangement extension responder, postural abnormalities    Manuals 7/8 7/11 7/15                                                              Neuro Re-Ed             Postural correction and awareness training             Prone lying             SJ hips, hands against table 2x15 1x20  1x10 3x10          bridges 3x10 3x10 3x10          Standing OH reaching             Standing hip EXT 2x10 ea 3x10 ea 3x10 ea          TB rows             Slouch-overcorrect seated 1x25 1x30 1x30                                    Ther Ex                                                                                                                     Ther Activity             STS with postural correction 1x20 1x20 1x20          Standing postural correction                          Gait Training             Gait training on floor with postural correction 1x75 ft 150 ft 210 ft                       Modalities                                          "

## 2024-07-18 ENCOUNTER — OFFICE VISIT (OUTPATIENT)
Dept: PHYSICAL THERAPY | Facility: CLINIC | Age: 69
End: 2024-07-18
Payer: COMMERCIAL

## 2024-07-18 DIAGNOSIS — M54.50 CHRONIC BILATERAL LOW BACK PAIN WITHOUT SCIATICA: Primary | ICD-10-CM

## 2024-07-18 DIAGNOSIS — G89.29 CHRONIC BILATERAL LOW BACK PAIN WITHOUT SCIATICA: Primary | ICD-10-CM

## 2024-07-18 DIAGNOSIS — M48.062 NEUROGENIC CLAUDICATION DUE TO LUMBAR SPINAL STENOSIS: ICD-10-CM

## 2024-07-18 PROCEDURE — 97530 THERAPEUTIC ACTIVITIES: CPT | Performed by: PHYSICAL THERAPIST

## 2024-07-18 PROCEDURE — 97112 NEUROMUSCULAR REEDUCATION: CPT | Performed by: PHYSICAL THERAPIST

## 2024-07-18 NOTE — PROGRESS NOTES
Daily Note     Today's date: 2024  Patient name: Jovani Stratton  : 1955  MRN: [id number]  Referring provider: Gui Brooks MD  Dx:   Encounter Diagnosis     ICD-10-CM    1. Chronic bilateral low back pain without sciatica  M54.50     G89.29       2. Neurogenic claudication due to lumbar spinal stenosis  M48.062                      Subjective: Pt reports 3/10 L>R LBP currently which will radiate to mid posterior L thigh upon standing if he sits for too long. He reports no compliance with HEP x 2 days due to computer problems and things falling out of his fridge when he opens it.     Objective: See treatment diary below    SJ I,B  Postural correction (standing) P,NW    Assessment: Pt demonstrates a more FF'ed posture in WB today secondary to prolonged sitting and HEP noncompliance.    Plan: Cont. POC.     Precautions: PMHx: DM II  Dx: chronic lumbar derangement extension responder, postural abnormalities    Manuals 7/8 7/11 7/15 7/18                                                             Neuro Re-Ed             Postural correction and awareness training    3 min         Prone lying             SJ hips, hands against table 2x15 1x20  1x10 3x10 3x10         bridges 3x10 3x10 3x10 3x10         Standing OH reaching             Standing hip EXT 2x10 ea 3x10 ea 3x10 ea 3x10 ea         TB rows             Slouch-overcorrect seated 1x25 1x30 1x30 1x30                                   Ther Ex                                                                                                                     Ther Activity             STS with postural correction 1x20 1x20 1x20 1x20         Standing postural correction                          Gait Training             Gait training on floor with postural correction 1x75 ft 150 ft 210 ft 100 ft                      Modalities

## 2024-07-22 ENCOUNTER — OFFICE VISIT (OUTPATIENT)
Dept: PHYSICAL THERAPY | Facility: CLINIC | Age: 69
End: 2024-07-22
Payer: COMMERCIAL

## 2024-07-22 DIAGNOSIS — G89.29 CHRONIC BILATERAL LOW BACK PAIN WITHOUT SCIATICA: Primary | ICD-10-CM

## 2024-07-22 DIAGNOSIS — M48.062 NEUROGENIC CLAUDICATION DUE TO LUMBAR SPINAL STENOSIS: ICD-10-CM

## 2024-07-22 DIAGNOSIS — M54.50 CHRONIC BILATERAL LOW BACK PAIN WITHOUT SCIATICA: Primary | ICD-10-CM

## 2024-07-22 PROCEDURE — 97530 THERAPEUTIC ACTIVITIES: CPT

## 2024-07-22 PROCEDURE — 97112 NEUROMUSCULAR REEDUCATION: CPT

## 2024-07-22 NOTE — PROGRESS NOTES
Daily Note     Today's date: 2024  Patient name: Jovani Stratton  : 1955  MRN: [id number]  Referring provider: Gui Brooks MD  Dx:   Encounter Diagnosis     ICD-10-CM    1. Chronic bilateral low back pain without sciatica  M54.50     G89.29       2. Neurogenic claudication due to lumbar spinal stenosis  M48.062                      Subjective: Pt reports no low back pain currently and not much pain over the weekend. Pt reports he was a little more compliant with his exercise program this weekend.     Objective: See treatment diary below      Assessment: Pt required constant vcs for postural correction. He demonstrated increased strength with bridging and decreased pain with ambulation.     Plan: Cont. POC.     Precautions: PMHx: DM II  Dx: chronic lumbar derangement extension responder, postural abnormalities    Manuals 7/8 7/11 7/15 7/18 7/22                                                            Neuro Re-Ed             Postural correction and awareness training    3 min 2 min        Prone lying             SJ hips, hands against table 2x15 1x20  1x10 3x10 3x10 3x10         bridges 3x10 3x10 3x10 3x10 3x12        Standing OH reaching             Standing hip EXT 2x10 ea 3x10 ea 3x10 ea 3x10 ea 3x10 ea        TB rows             Slouch-overcorrect seated 1x25 1x30 1x30 1x30 1x30                                  Ther Ex                                                                                                                     Ther Activity             STS with postural correction 1x20 1x20 1x20 1x20 1x20        Standing postural correction     1 min                     Gait Training             Gait training on floor with postural correction 1x75 ft 150 ft 210 ft 100 ft 100 ft                     Modalities

## 2024-07-23 DIAGNOSIS — R80.9 TYPE 2 DIABETES MELLITUS WITH MICROALBUMINURIA, WITHOUT LONG-TERM CURRENT USE OF INSULIN (HCC): ICD-10-CM

## 2024-07-23 DIAGNOSIS — E11.29 TYPE 2 DIABETES MELLITUS WITH MICROALBUMINURIA, WITHOUT LONG-TERM CURRENT USE OF INSULIN (HCC): ICD-10-CM

## 2024-07-23 PROCEDURE — 3066F NEPHROPATHY DOC TX: CPT | Performed by: ORTHOPAEDIC SURGERY

## 2024-07-24 PROCEDURE — 4010F ACE/ARB THERAPY RXD/TAKEN: CPT | Performed by: ORTHOPAEDIC SURGERY

## 2024-07-24 RX ORDER — LISINOPRIL 5 MG/1
5 TABLET ORAL DAILY
Qty: 90 TABLET | Refills: 0 | Status: SHIPPED | OUTPATIENT
Start: 2024-07-24

## 2024-07-25 ENCOUNTER — OFFICE VISIT (OUTPATIENT)
Dept: PHYSICAL THERAPY | Facility: CLINIC | Age: 69
End: 2024-07-25
Payer: COMMERCIAL

## 2024-07-25 DIAGNOSIS — G89.29 CHRONIC BILATERAL LOW BACK PAIN WITHOUT SCIATICA: Primary | ICD-10-CM

## 2024-07-25 DIAGNOSIS — M48.062 NEUROGENIC CLAUDICATION DUE TO LUMBAR SPINAL STENOSIS: ICD-10-CM

## 2024-07-25 DIAGNOSIS — M54.50 CHRONIC BILATERAL LOW BACK PAIN WITHOUT SCIATICA: Primary | ICD-10-CM

## 2024-07-25 PROCEDURE — 97112 NEUROMUSCULAR REEDUCATION: CPT | Performed by: PHYSICAL THERAPIST

## 2024-07-25 NOTE — PROGRESS NOTES
Daily Note     Today's date: 2024  Patient name: Jovani Stratton  : 1955  MRN: [id number]  Referring provider: Gui Brooks MD  Dx:   Encounter Diagnosis     ICD-10-CM    1. Chronic bilateral low back pain without sciatica  M54.50     G89.29       2. Neurogenic claudication due to lumbar spinal stenosis  M48.062                      Subjective: Pt reports 3/10 L LBP with STS, prolonged sitting, and postural correction.    Objective: See treatment diary below    SJ increases, no worse (increased ROM)  RFISit increases, no worse (decreased ROM)    Assessment: Pt continues to demonstrate an extension DP but lacks sufficient ROM, postural awareness, and HEP frequency to maintain any reduction.    Plan: Cont. POC.     Precautions: PMHx: DM II  Dx: chronic lumbar derangement extension responder, postural abnormalities    Manuals 7/8 7/11 7/15 7/18 7/22 7/25                                                           Neuro Re-Ed             Postural correction and awareness training    3 min 2 min        Prone lying             SJ hips, hands against table 2x15 1x20  1x10 3x10 3x10 3x10  3x10       bridges 3x10 3x10 3x10 3x10 3x12 3x10       Standing OH reaching             Standing hip EXT 2x10 ea 3x10 ea 3x10 ea 3x10 ea 3x10 ea 3x10 ea       TB rows             Slouch-overcorrect seated 1x25 1x30 1x30 1x30 1x30 nv                                 Ther Ex                                                                                                                     Ther Activity             STS with postural correction 1x20 1x20 1x20 1x20 1x20 nv       Standing postural correction     1 min                     Gait Training             Gait training on floor with postural correction 1x75 ft 150 ft 210 ft 100 ft 100 ft 100 ft                    Modalities

## 2024-07-29 ENCOUNTER — OFFICE VISIT (OUTPATIENT)
Dept: PHYSICAL THERAPY | Facility: CLINIC | Age: 69
End: 2024-07-29
Payer: COMMERCIAL

## 2024-07-29 DIAGNOSIS — G89.29 CHRONIC BILATERAL LOW BACK PAIN WITHOUT SCIATICA: Primary | ICD-10-CM

## 2024-07-29 DIAGNOSIS — M48.062 NEUROGENIC CLAUDICATION DUE TO LUMBAR SPINAL STENOSIS: ICD-10-CM

## 2024-07-29 DIAGNOSIS — M54.50 CHRONIC BILATERAL LOW BACK PAIN WITHOUT SCIATICA: Primary | ICD-10-CM

## 2024-07-29 PROCEDURE — 97112 NEUROMUSCULAR REEDUCATION: CPT | Performed by: PHYSICAL THERAPIST

## 2024-07-29 PROCEDURE — 97140 MANUAL THERAPY 1/> REGIONS: CPT | Performed by: PHYSICAL THERAPIST

## 2024-07-29 NOTE — PROGRESS NOTES
Daily Note     Today's date: 2024  Patient name: Jovani Stratton  : 1955  MRN: [id number]  Referring provider: Gui Brooks MD  Dx:   Encounter Diagnosis     ICD-10-CM    1. Chronic bilateral low back pain without sciatica  M54.50     G89.29       2. Neurogenic claudication due to lumbar spinal stenosis  M48.062                      Subjective: Pt reports 5/10 B LBP without radicular symptoms since having to do bent-over yardwork yesterday. He reports not attempting HEP secondary to increased LBP x 2 days.    Objective: See treatment diary below    SJ increases, NW  Prone lying abolishes, better  PA mobs prone abolishes, better    Assessment: Pt continues to demonstrate an extension DP but lacks sufficient ROM, postural awareness, and HEP frequency to maintain any reduction. Pt required prone lying and clin OP to improve pain.    Plan: Cont. POC.     Precautions: PMHx: DM II  Dx: chronic lumbar derangement extension responder, postural abnormalities    Manuals 7/8 7/11 7/15 7/18 7/22 7/25 7/29      Prone L5,S1 PA mobs Gr III-IV       1x20 ea                                             Neuro Re-Ed             Postural correction and awareness training    3 min 2 min        Prone lying       5 min      SJ hips, hands against table 2x15 1x20  1x10 3x10 3x10 3x10  3x10 4x10      bridges 3x10 3x10 3x10 3x10 3x12 3x10 3x10      Standing OH reaching             Standing hip EXT 2x10 ea 3x10 ea 3x10 ea 3x10 ea 3x10 ea 3x10 ea       TB rows             Slouch-overcorrect seated 1x25 1x30 1x30 1x30 1x30 nv                                 Ther Ex                                                                                                                     Ther Activity             STS with postural correction 1x20 1x20 1x20 1x20 1x20 nv       Standing postural correction     1 min                     Gait Training             Gait training on floor with postural correction 1x75 ft 150 ft 210 ft 100  ft 100 ft 100 ft                    Modalities

## 2024-07-30 ENCOUNTER — OFFICE VISIT (OUTPATIENT)
Dept: OBGYN CLINIC | Facility: HOSPITAL | Age: 69
End: 2024-07-30
Payer: COMMERCIAL

## 2024-07-30 VITALS
HEIGHT: 69 IN | DIASTOLIC BLOOD PRESSURE: 86 MMHG | WEIGHT: 151.01 LBS | SYSTOLIC BLOOD PRESSURE: 133 MMHG | HEART RATE: 93 BPM | BODY MASS INDEX: 22.37 KG/M2

## 2024-07-30 DIAGNOSIS — M54.50 CHRONIC BILATERAL LOW BACK PAIN WITHOUT SCIATICA: ICD-10-CM

## 2024-07-30 DIAGNOSIS — G89.29 CHRONIC BILATERAL LOW BACK PAIN WITHOUT SCIATICA: ICD-10-CM

## 2024-07-30 DIAGNOSIS — M48.062 NEUROGENIC CLAUDICATION DUE TO LUMBAR SPINAL STENOSIS: Primary | ICD-10-CM

## 2024-07-30 PROCEDURE — 99212 OFFICE O/P EST SF 10 MIN: CPT | Performed by: ORTHOPAEDIC SURGERY

## 2024-07-30 PROCEDURE — 1160F RVW MEDS BY RX/DR IN RCRD: CPT | Performed by: ORTHOPAEDIC SURGERY

## 2024-07-30 PROCEDURE — 1159F MED LIST DOCD IN RCRD: CPT | Performed by: ORTHOPAEDIC SURGERY

## 2024-07-30 NOTE — PROGRESS NOTES
Assessment & Plan/Medical Decision Makin y.o. male with Back Pain, Ambulatory Dysfunction, and Neurogenic Claudication and imaging findings most notable for lumbar spondylosis  and L3-4, L4-5 disc degeneration          The clinical, physical and imaging findings were reviewed with the patient.  Jovani has a constellation of findings consistent with Lumbar Myofascial Pain, Ambulatory Dysfunction, and Neurogenic Claudication in the setting of lumbar degenerative disease.      Fortunately patient remains functional. Physical exam showing limitation in extension and lower extremity weakness. We discussed the treatment options including physical therapy, at home exercises, activity modifications, chiropractic medicine, oral medications, interventional spine procedures.  We did again discuss the role of surgical intervention given his severe spinal stenosis.      He was encouraged to continue physical therapy. Referral to spine and pain provided for consideration of injections. Reviewed that surgery is an option but would not provide him with more mobility which patient was concerned about.     Patient instructed to return to office/ER sooner if symptoms are not improving, getting worse, or new worrisome/neurologic symptoms arise.  Patient will follow up in 2 months for re-evaluation.     Subjective:      Chief Complaint: Back Pain    HPI:  Jovani Stratton is a 68 y.o. male presenting for initial visit with chief complaint of lower back pain for several years. Previously had a history of herniated discs in the , but his pain and radicular symptoms improved following PT at that time. He does not have radicular symptoms at this time.      Describes pain as dull and achy in nature.  Located in low back.  Denies numbness . Denies burning.  Back pain 100%, Leg pain 0%.  L > R. Pain is worse with prolonged standing and walking and improves with rest.    Denies any hussain trauma. Denies fever or chills, no night  sweats. Denies any bladder or bowel changes.      Conservative therapy includes the following:   Medications: Motrin    Injections: none  Physical Therapy: in the past, nothing recent  Chiropractic Medicine: has not attempted  Accupunture/Massage Therapy: has not attempted   These therapeutic modalities were ineffective at providing sustained pain relief/functional improvement.     Nicotine dependent: No  Occupation: Retired  Living situation: Lives with family   ADLs: patient is able to perform     Update 5/28/24  Presents for follow up to discuss MRI results. He reports since previous appointment, his back pain is slightly worse. He denies leg pain, denies numbness or paresthesias. 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 He occasionally takes ibuprofen, but it does not relieve his symptoms.     Update 07/30/2024  68 y.o. male presents to the office for follow up of low back pain. Since last visit he has been taking gabapentin and started physical therapy. He feels that physical therapy is helping him stand more erect and walk slightly longer but still has pain. He also notes that he has had some side effects from the gabapentin. He continues to have difficulty with forward bending and doing his yard work. He also struggles with his balance and notes a fall a few weeks ago.    Objective:     History reviewed. No pertinent family history.    Past Medical History:   Diagnosis Date    Candidal intertrigo 10/16/2015    Diabetes mellitus (HCC)     Thyroid nodule 10/16/2015       Current Outpatient Medications   Medication Sig Dispense Refill    atorvastatin (LIPITOR) 40 mg tablet TAKE 1 TABLET (40 MG TOTAL) BY MOUTH IN THE MORNING 90 tablet 2    azelastine (OPTIVAR) 0.05 % ophthalmic solution       clotrimazole-betamethasone (LOTRISONE) 1-0.05 % lotion Apply topically 2 (two) times a day 30 mL 0     glipiZIDE (GLUCOTROL XL) 5 mg 24 hr tablet TAKE 1 TABLET (5 MG TOTAL) BY MOUTH IN THE MORNING 90 tablet 2    glucose blood (OneTouch Verio) test strip CHECK FASTING BLOOD SUGAR EVERY OTHER DAY IN THE MORNING BEFORE BREAKFAST. 25 strip 11    Jardiance 10 MG TABS tablet TAKE 1 TABLET (10 MG TOTAL) BY MOUTH EVERY MORNING. 90 tablet 3    lisinopril (ZESTRIL) 5 mg tablet TAKE 1 TABLET (5 MG TOTAL) BY MOUTH DAILY. 90 tablet 0    Blood Glucose Monitoring Suppl (FreeStyle Lite) BATSHEVA Check fasting blood sugar every other day in the morning before breakfast. 1 each 0    gabapentin (Neurontin) 300 mg capsule Take 1 capsule (300 mg total) by mouth daily at bedtime 30 capsule 0    Lancets (freestyle) lancets Check fasting blood sugar every other day in the morning before breakfast. 300 each 1     No current facility-administered medications for this visit.       History reviewed. No pertinent surgical history.    Social History     Socioeconomic History    Marital status: Single     Spouse name: Not on file    Number of children: Not on file    Years of education: Not on file    Highest education level: Not on file   Occupational History    Not on file   Tobacco Use    Smoking status: Never    Smokeless tobacco: Never   Vaping Use    Vaping status: Never Used   Substance and Sexual Activity    Alcohol use: Yes     Alcohol/week: 1.0 standard drink of alcohol     Types: 1 Cans of beer per week    Drug use: No    Sexual activity: Not on file   Other Topics Concern    Not on file   Social History Narrative    Not on file     Social Determinants of Health     Financial Resource Strain: Medium Risk (12/24/2023)    Overall Financial Resource Strain (CARDIA)     Difficulty of Paying Living Expenses: Somewhat hard   Food Insecurity: Food Insecurity Present (12/24/2023)    Hunger Vital Sign     Worried About Running Out of Food in the Last Year: Sometimes true     Ran Out of Food in the Last Year: Never true   Transportation Needs: No  "Transportation Needs (12/24/2023)    PRAPARE - Transportation     Lack of Transportation (Medical): No     Lack of Transportation (Non-Medical): No   Physical Activity: Not on file   Stress: No Stress Concern Present (12/26/2023)    [employer] - Occupational Stress Questionnaire     Feeling of Stress : Not at all   Social Connections: Unknown (6/18/2024)    Received from [facility]    Social [employer]     How often do you feel lonely or isolated from those around you? (Adult - for ages 18 years and over): Not on file   Intimate Partner Violence: Not At Risk (12/26/2023)    Humiliation, Afraid, Rape, and Kick questionnaire     Fear of Current or Ex-Partner: No     Emotionally Abused: No     Physically Abused: No     Sexually Abused: No   Housing Stability: Low Risk  (12/26/2023)    Housing Stability Vital Sign     Unable to Pay for Housing in the Last Year: No     Number of Times Moved in the Last Year: 1     Homeless in the Last Year: No       Allergies   Allergen Reactions    Acetaminophen Diarrhea, Nausea Only, Dizziness and Lightheadedness     Liquid only        Review of Systems  General- denies fever/chills  HEENT- denies hearing loss or sore throat  Eyes- denies eye pain or visual disturbances, denies red eyes  Respiratory- denies cough or SOB  Cardio- denies chest pain or palpitations  GI- denies abdominal pain  Endocrine- denies urinary frequency  Urinary- denies pain with urination  Musculoskeletal- Negative except noted above  Skin- denies rashes or wounds  Neurological- denies dizziness or headache  Psychiatric- denies anxiety or difficulty concentrating    Physical Exam  /86   Pulse 93   Ht 5' 9\" (1.753 m)   Wt 68.5 kg (151 lb 0.2 oz)   BMI 22.30 kg/m²     General/Constitutional: No apparent distress: well-nourished and well developed.  Lymphatic: No appreciable lymphadenopathy  Respiratory: Non-labored breathing  Vascular: No edema, swelling or tenderness, except " as noted in detailed exam.  Integumentary: No impressive skin lesions present, except as noted in detailed exam.  Psych: Normal mood and affect, oriented to person, place and time.  MSK: normal other than stated in HPI and exam  Gait & balance: no evidence of myelopathic gait, ambulates with a Cane    Lumbar spine range of motion:  -Forward flexion to 90  -Extension to neutral  -Lateral bend 25 right, 25 left  -Rotation 25 right, 25 left  There tenderness with palpation along lumbar paraspinal musculature, no midline tenderness     Neurologic:    Lower Extremity Motor Function    Right  Left    Iliopsoas  5/5  5/5    Quadriceps 4/5 4/5   Tibialis anterior  4/5  4/5    EHL  4+/5  4+/5    Gastroc. muscle  5/5  5/5    Heel rise  4/5  4/5    Toe rise  4/5  4/5      Sensory: light touch is intact to bilateral upper and lower extremities     Reflexes:    Right Left   Patellar 0 1+   Achilles 0 0   Babinski neg neg     Other tests:  Straight Leg Raise: negative  Fannie SI: negative  NATALY SI: negative  Greater troch: no tenderness   Internal/external hip ROM: intact, no pain   Flexion/extension knee ROM: intact, no pain   Vascular: WWP extremities, 2+DP bilateral      Diagnostic Tests   IMAGING: I have personally reviewed the images and these are my findings:  Lumbar Spine X-rays from 3/28 and 4/22/24: multi level lumbar spondylosis with loss of disc height, osteophyte formation and facet hypertrophy, no apparent spondylolisthesis, no appreciated lytic/blastic lesions, no obvious instability    CT Renal Stone Study from 11/28/2019: calcified discs with lumbar spondylosis, loss of disc height, and severe central stenosis at L3-4 and L4-5.    Lumbar MRI from 5/19/24: multi level lumbar spondylosis with loss of disc height, osteophyte formation and facet hypertrophy, spinal stenosis at L3-4 and L4-5, no apparent spondylolisthesis, no appreciated lytic or blastic lesions, no obvious instability     Electronic Medical Records  "were reviewed including imaging studies and PMHx    Procedures, if performed today     None performed       Portions of the record may have been created with voice recognition software.  Occasional wrong word or \"sound a like\" substitutions may have occurred due to the inherent limitations of voice recognition software.  Read the chart carefully and recognize, using context, where substitutions have occurred.  "

## 2024-07-31 ENCOUNTER — TELEPHONE (OUTPATIENT)
Dept: OTHER | Facility: OTHER | Age: 69
End: 2024-07-31

## 2024-07-31 NOTE — TELEPHONE ENCOUNTER
Pt called to try to set up an initial appointment with Pain Management.  Pt will call office tomorrow afternoon when office is open.  Pt does work night shift and may be sleeping during the early daytime hours.

## 2024-08-01 ENCOUNTER — OFFICE VISIT (OUTPATIENT)
Dept: PHYSICAL THERAPY | Facility: CLINIC | Age: 69
End: 2024-08-01
Payer: COMMERCIAL

## 2024-08-01 DIAGNOSIS — G89.29 CHRONIC BILATERAL LOW BACK PAIN WITHOUT SCIATICA: Primary | ICD-10-CM

## 2024-08-01 DIAGNOSIS — M48.062 NEUROGENIC CLAUDICATION DUE TO LUMBAR SPINAL STENOSIS: ICD-10-CM

## 2024-08-01 DIAGNOSIS — M54.50 CHRONIC BILATERAL LOW BACK PAIN WITHOUT SCIATICA: Primary | ICD-10-CM

## 2024-08-01 PROCEDURE — 97140 MANUAL THERAPY 1/> REGIONS: CPT | Performed by: PHYSICAL THERAPIST

## 2024-08-01 PROCEDURE — 97112 NEUROMUSCULAR REEDUCATION: CPT | Performed by: PHYSICAL THERAPIST

## 2024-08-01 NOTE — PROGRESS NOTES
"Daily Note     Today's date: 2024  Patient name: Jovani Stratton  : 1955  MRN: [id number]  Referring provider: Gui Brooks MD  Dx:   Encounter Diagnosis     ICD-10-CM    1. Chronic bilateral low back pain without sciatica  M54.50     G89.29       2. Neurogenic claudication due to lumbar spinal stenosis  M48.062                      Subjective: Pt reports 1/10 LBP currently.    Objective: See treatment diary below    Assessment: Pt demonstrated improved tolerance to bridges and postural correction but continues with poor extension ROM.    Plan: Cont. POC.     Precautions: PMHx: DM II  Dx: chronic lumbar derangement extension responder, postural abnormalities    Manuals 7/8 7/11 7/15 7/18 7/22 7/25 7/29 8/1     Prone L5,S1 PA mobs Gr III-IV       1x20 ea L4-S1  1x20 ea                                            Neuro Re-Ed             Postural correction and awareness training    3 min 2 min        Prone lying       5 min 2 min     SJ hips, hands against table 2x15 1x20  1x10 3x10 3x10 3x10  3x10 4x10 3x10     bridges 3x10 3x10 3x10 3x10 3x12 3x10 3x10 3x15                  Standing hip EXT 2x10 ea 3x10 ea 3x10 ea 3x10 ea 3x10 ea 3x10 ea       TB rows        RTB/  1x15     Slouch-overcorrect seated 1x25 1x30 1x30 1x30 1x30 nv  1x30                               Ther Ex             Standing hip flexor stretch        10\"x2 ea                                                                                                Ther Activity             STS with postural correction 1x20 1x20 1x20 1x20 1x20 nv       Standing postural correction     1 min                     Gait Training             Gait training on floor with postural correction 1x75 ft 150 ft 210 ft 100 ft 100 ft 100 ft                    Modalities                                          "

## 2024-08-05 ENCOUNTER — OFFICE VISIT (OUTPATIENT)
Dept: PHYSICAL THERAPY | Facility: CLINIC | Age: 69
End: 2024-08-05
Payer: COMMERCIAL

## 2024-08-05 DIAGNOSIS — M48.062 NEUROGENIC CLAUDICATION DUE TO LUMBAR SPINAL STENOSIS: ICD-10-CM

## 2024-08-05 DIAGNOSIS — M54.50 CHRONIC BILATERAL LOW BACK PAIN WITHOUT SCIATICA: Primary | ICD-10-CM

## 2024-08-05 DIAGNOSIS — G89.29 CHRONIC BILATERAL LOW BACK PAIN WITHOUT SCIATICA: Primary | ICD-10-CM

## 2024-08-05 PROCEDURE — 97112 NEUROMUSCULAR REEDUCATION: CPT

## 2024-08-05 PROCEDURE — 97110 THERAPEUTIC EXERCISES: CPT

## 2024-08-05 NOTE — PROGRESS NOTES
"Daily Note     Today's date: 2024  Patient name: Jovani Stratton  : 1955  MRN: [id number]  Referring provider: Gui Brooks MD  Dx:   Encounter Diagnosis     ICD-10-CM    1. Chronic bilateral low back pain without sciatica  M54.50     G89.29       2. Neurogenic claudication due to lumbar spinal stenosis  M48.062                      Subjective: Pt reports 1/10 LBP currently. Did note problems when trying to go upstairs after prolonged sitting at computer; has pain at L gluteal fold.    Objective: See treatment diary below    Assessment: Pt demonstrated improved tolerance to bridges and postural correction but continues with poor extension ROM. Continued posture education including ergonomic set-up for computer and getting vision tested/lens prescription updated as he reports having to hunch over to see screen clearly.     Plan: Cont. POC.     Precautions: PMHx: DM II  Dx: chronic lumbar derangement extension responder, postural abnormalities    Manuals 7/8 7/11 7/15 7/18 7/22 7/25 7/29 8/1 8/5    Prone L5,S1 PA mobs Gr III-IV       1x20 ea L4-S1  1x20 ea                                            Neuro Re-Ed             Postural correction and awareness training    3 min 2 min    2 min    Prone lying       5 min 2 min 5 min    SJ hips, hands against table 2x15 1x20  1x10 3x10 3x10 3x10  3x10 4x10 3x10 3x10    bridges 3x10 3x10 3x10 3x10 3x12 3x10 3x10 3x15 3x12                 Standing hip EXT 2x10 ea 3x10 ea 3x10 ea 3x10 ea 3x10 ea 3x10 ea       TB rows        RTB/  1x15 RTB 2x15    Slouch-overcorrect seated 1x25 1x30 1x30 1x30 1x30 nv  1x30                               Ther Ex             Standing hip flexor stretch        10\"x2 ea 3x15\" ea                                                                                               Ther Activity             STS with postural correction 1x20 1x20 1x20 1x20 1x20 nv       Standing postural correction     1 min                     Gait Training   "           Gait training on floor with postural correction 1x75 ft 150 ft 210 ft 100 ft 100 ft 100 ft                    Modalities

## 2024-08-08 ENCOUNTER — APPOINTMENT (OUTPATIENT)
Dept: PHYSICAL THERAPY | Facility: CLINIC | Age: 69
End: 2024-08-08
Payer: COMMERCIAL

## 2024-08-12 ENCOUNTER — OFFICE VISIT (OUTPATIENT)
Dept: PHYSICAL THERAPY | Facility: CLINIC | Age: 69
End: 2024-08-12
Payer: COMMERCIAL

## 2024-08-12 DIAGNOSIS — M48.062 NEUROGENIC CLAUDICATION DUE TO LUMBAR SPINAL STENOSIS: ICD-10-CM

## 2024-08-12 DIAGNOSIS — M54.50 CHRONIC BILATERAL LOW BACK PAIN WITHOUT SCIATICA: Primary | ICD-10-CM

## 2024-08-12 DIAGNOSIS — G89.29 CHRONIC BILATERAL LOW BACK PAIN WITHOUT SCIATICA: Primary | ICD-10-CM

## 2024-08-12 PROCEDURE — 97112 NEUROMUSCULAR REEDUCATION: CPT

## 2024-08-12 PROCEDURE — 97110 THERAPEUTIC EXERCISES: CPT

## 2024-08-12 NOTE — PROGRESS NOTES
"Daily Note     Today's date: 2024  Patient name: Jovani Stratton  : 1955  MRN: [id number]  Referring provider: Gui Brooks MD  Dx:   Encounter Diagnosis     ICD-10-CM    1. Chronic bilateral low back pain without sciatica  M54.50     G89.29       2. Neurogenic claudication due to lumbar spinal stenosis  M48.062                      Subjective: Pt reports 2-3/10 LBP currently and poor HEP compliance over the weekend. Pt reports changing his seat position when on the computer to help sit with better posture.    Objective: See treatment diary below    Assessment: Pt required frequent cueing to correct posture throughout session. Pt demonstrated no significant change in pain levels post tx but did demonstrate increased extension ROM.     Plan: Cont. POC.     Precautions: PMHx: DM II  Dx: chronic lumbar derangement extension responder, postural abnormalities    Manuals 7/8 7/11 7/15 7/18 7/22 7/25 7/29 8/1 8/5 8/12   Prone L5,S1 PA mobs Gr III-IV       1x20 ea L4-S1  1x20 ea                                            Neuro Re-Ed             Postural correction and awareness training    3 min 2 min    2 min 1 min   Prone lying       5 min 2 min 5 min 5 min   SJ hips, hands against table 2x15 1x20  1x10 3x10 3x10 3x10  3x10 4x10 3x10 3x10 4x10   bridges 3x10 3x10 3x10 3x10 3x12 3x10 3x10 3x15 3x12 3x15                Standing hip EXT 2x10 ea 3x10 ea 3x10 ea 3x10 ea 3x10 ea 3x10 ea       TB rows        RTB/  1x15 RTB 2x15 RTB  2x15   Slouch-overcorrect seated 1x25 1x30 1x30 1x30 1x30 nv  1x30 1x30 1x30                             Ther Ex             Standing hip flexor stretch        10\"x2 ea 3x15\" ea 3x15\" ea                                                                                              Ther Activity             STS with postural correction 1x20 1x20 1x20 1x20 1x20 nv       Standing postural correction     1 min                     Gait Training             Gait training on floor with " postural correction 1x75 ft 150 ft 210 ft 100 ft 100 ft 100 ft                    Modalities

## 2024-08-15 ENCOUNTER — OFFICE VISIT (OUTPATIENT)
Dept: PHYSICAL THERAPY | Facility: CLINIC | Age: 69
End: 2024-08-15
Payer: COMMERCIAL

## 2024-08-15 DIAGNOSIS — M48.062 NEUROGENIC CLAUDICATION DUE TO LUMBAR SPINAL STENOSIS: ICD-10-CM

## 2024-08-15 DIAGNOSIS — G89.29 CHRONIC BILATERAL LOW BACK PAIN WITHOUT SCIATICA: Primary | ICD-10-CM

## 2024-08-15 DIAGNOSIS — M54.50 CHRONIC BILATERAL LOW BACK PAIN WITHOUT SCIATICA: Primary | ICD-10-CM

## 2024-08-15 PROCEDURE — 97112 NEUROMUSCULAR REEDUCATION: CPT | Performed by: PHYSICAL THERAPIST

## 2024-08-15 NOTE — PROGRESS NOTES
"Daily Note     Today's date: 8/15/2024  Patient name: Jovani Stratton  : 1955  MRN: [id number]  Referring provider: Gui Brooks MD  Dx:   Encounter Diagnosis     ICD-10-CM    1. Chronic bilateral low back pain without sciatica  M54.50     G89.29       2. Neurogenic claudication due to lumbar spinal stenosis  M48.062                      Subjective: Pt reports 2.5/10 L LBP currently with radiation into the proximal post L thigh with sitting. He reports poor compliance with HEP > postural correction.    Objective: See treatment diary below    Assessment: Pt demonstrated NE, NE with SJ and core stab however continues to lack sufficient lumbar extension ROM for postural correction in sitting or standing.    Plan: Cont. POC.     Precautions: PMHx: DM II  Dx: chronic lumbar derangement extension responder, postural abnormalities    Manuals 8/15         8/12   Prone L5,S1 PA mobs Gr III-IV                                                    Neuro Re-Ed             Postural correction and awareness training          1 min   Prone lying          5 min   SJ hips, hands against table 3x10         4x10   bridges 3x15         3x15                             TB rows RTB  3x15         RTB  2x15   Slouch-overcorrect seated 1x30         1x30                             Ther Ex             Standing hip flexor stretch 15\"x3 ea         3x15\" ea                                                                                              Ther Activity             STS with postural correction             Standing postural correction                          Gait Training             Gait training on floor with postural correction                          Modalities                                          "

## 2024-08-19 ENCOUNTER — OFFICE VISIT (OUTPATIENT)
Dept: PHYSICAL THERAPY | Facility: CLINIC | Age: 69
End: 2024-08-19
Payer: COMMERCIAL

## 2024-08-19 DIAGNOSIS — M48.062 NEUROGENIC CLAUDICATION DUE TO LUMBAR SPINAL STENOSIS: ICD-10-CM

## 2024-08-19 DIAGNOSIS — M54.50 CHRONIC BILATERAL LOW BACK PAIN WITHOUT SCIATICA: Primary | ICD-10-CM

## 2024-08-19 DIAGNOSIS — G89.29 CHRONIC BILATERAL LOW BACK PAIN WITHOUT SCIATICA: Primary | ICD-10-CM

## 2024-08-19 PROCEDURE — 97112 NEUROMUSCULAR REEDUCATION: CPT | Performed by: PHYSICAL THERAPIST

## 2024-08-19 NOTE — PROGRESS NOTES
"Daily Note     Today's date: 2024  Patient name: Jovani Stratton  : 1955  MRN: [id number]  Referring provider: Gui Brooks MD  Dx:   Encounter Diagnosis     ICD-10-CM    1. Chronic bilateral low back pain without sciatica  M54.50     G89.29       2. Neurogenic claudication due to lumbar spinal stenosis  M48.062                      Subjective: Pt reports L LBP currently with radiation into the proximal post L thigh when sitting. He reports poor compliance with HEP > postural correction.    Objective: See treatment diary below    Assessment: Pt demonstrated no change in postural awareness or pain likely secondary to poor HEP compliance.    Plan: Cont. POC.     Precautions: PMHx: DM II  Dx: chronic lumbar derangement extension responder, postural abnormalities    Manuals 8/15 8/19           Prone L5,S1 PA mobs Gr III-IV                                                    Neuro Re-Ed             Postural correction and awareness training             Prone lying             SJ hips, hands against table 3x10 4x10           bridges 3x15 4x12                                     TB rows RTB  3x15 G/  3x10           Slouch-overcorrect seated 1x30 3x10                                     Ther Ex             Standing hip flexor stretch 15\"x3 ea 15\"x3 ea                                                                                                      Ther Activity             STS with postural correction             Standing postural correction                          Gait Training             Gait training on floor with postural correction                          Modalities                                          "

## 2024-08-22 ENCOUNTER — OFFICE VISIT (OUTPATIENT)
Dept: PHYSICAL THERAPY | Facility: CLINIC | Age: 69
End: 2024-08-22
Payer: COMMERCIAL

## 2024-08-22 DIAGNOSIS — M48.062 NEUROGENIC CLAUDICATION DUE TO LUMBAR SPINAL STENOSIS: ICD-10-CM

## 2024-08-22 DIAGNOSIS — G89.29 CHRONIC BILATERAL LOW BACK PAIN WITHOUT SCIATICA: Primary | ICD-10-CM

## 2024-08-22 DIAGNOSIS — M54.50 CHRONIC BILATERAL LOW BACK PAIN WITHOUT SCIATICA: Primary | ICD-10-CM

## 2024-08-22 PROCEDURE — 97112 NEUROMUSCULAR REEDUCATION: CPT | Performed by: PHYSICAL THERAPIST

## 2024-08-26 ENCOUNTER — OFFICE VISIT (OUTPATIENT)
Dept: PHYSICAL THERAPY | Facility: CLINIC | Age: 69
End: 2024-08-26
Payer: COMMERCIAL

## 2024-08-26 DIAGNOSIS — M54.50 CHRONIC BILATERAL LOW BACK PAIN WITHOUT SCIATICA: Primary | ICD-10-CM

## 2024-08-26 DIAGNOSIS — G89.29 CHRONIC BILATERAL LOW BACK PAIN WITHOUT SCIATICA: Primary | ICD-10-CM

## 2024-08-26 DIAGNOSIS — M48.062 NEUROGENIC CLAUDICATION DUE TO LUMBAR SPINAL STENOSIS: ICD-10-CM

## 2024-08-26 PROCEDURE — 97112 NEUROMUSCULAR REEDUCATION: CPT

## 2024-08-26 NOTE — PROGRESS NOTES
"Daily Note     Today's date: 2024  Patient name: Jovani Stratton  : 1955  MRN: [id number]  Referring provider: Gui Brooks MD  Dx:   Encounter Diagnosis     ICD-10-CM    1. Chronic bilateral low back pain without sciatica  M54.50     G89.29       2. Neurogenic claudication due to lumbar spinal stenosis  M48.062                      Subjective: Pt reports 1/10 low back pain pre tx, better tolerance to walking. Pt reports fair HEP compliance.     Objective: See treatment diary below    Assessment: Pt demonstrated increased postural endurance with less vcs required from therapist to correct. Pt continues to demonstrate fair HEP compliance.     Plan: Cont. POC.     Precautions: PMHx: DM II  Dx: chronic lumbar derangement extension responder, postural abnormalities    Manuals 8/15 8/19 8/22 8/26         Prone L5,S1 PA mobs Gr III-IV                                                    Neuro Re-Ed             Postural correction and awareness training             Prone lying             SJ hips, hands against table 3x10 4x10 4x10 5x10         bridges 3x15 4x12 4x12 4x12                                   TB rows RTB  3x15 G/  3x10 G/  3x10 G/  3x12         Slouch-overcorrect seated 1x30 3x10 3x10 3x10                                   Ther Ex             Standing hip flexor stretch 15\"x3 ea 15\"x3 ea 15\"x3 ea 15\"x3 ea                                                                                                    Ther Activity             STS with postural correction             Standing postural correction                          Gait Training             Gait training on floor with postural correction                          Modalities                                          "

## 2024-08-28 ENCOUNTER — TELEPHONE (OUTPATIENT)
Age: 69
End: 2024-08-28

## 2024-08-28 NOTE — TELEPHONE ENCOUNTER
Caller: Patient    Doctor: Todd    Reason for call: Patient has appointment scheduled w/ Dr Brooks on 9/10, and an appointment with pain management on 9/13.    He would like to know if Dr Brooks would like him to reschedule his appointment until after that with the pain management, or if he should keep the one he has on 9/10.    Please reach back out to patient to advise    Call back#: [phone]  Alternate#: [phone]

## 2024-08-29 ENCOUNTER — OFFICE VISIT (OUTPATIENT)
Dept: PHYSICAL THERAPY | Facility: CLINIC | Age: 69
End: 2024-08-29
Payer: COMMERCIAL

## 2024-08-29 ENCOUNTER — CONSULT (OUTPATIENT)
Dept: PAIN MEDICINE | Facility: CLINIC | Age: 69
End: 2024-08-29
Payer: COMMERCIAL

## 2024-08-29 VITALS
HEIGHT: 69 IN | SYSTOLIC BLOOD PRESSURE: 146 MMHG | BODY MASS INDEX: 22.1 KG/M2 | WEIGHT: 149.2 LBS | DIASTOLIC BLOOD PRESSURE: 76 MMHG | HEART RATE: 85 BPM

## 2024-08-29 DIAGNOSIS — M48.061 SPINAL STENOSIS OF LUMBAR REGION, UNSPECIFIED WHETHER NEUROGENIC CLAUDICATION PRESENT: ICD-10-CM

## 2024-08-29 DIAGNOSIS — M47.816 LUMBAR SPONDYLOSIS: Primary | ICD-10-CM

## 2024-08-29 DIAGNOSIS — M54.50 CHRONIC BILATERAL LOW BACK PAIN WITHOUT SCIATICA: Primary | ICD-10-CM

## 2024-08-29 DIAGNOSIS — G89.29 CHRONIC BILATERAL LOW BACK PAIN WITHOUT SCIATICA: Primary | ICD-10-CM

## 2024-08-29 DIAGNOSIS — M48.062 NEUROGENIC CLAUDICATION DUE TO LUMBAR SPINAL STENOSIS: ICD-10-CM

## 2024-08-29 PROBLEM — M54.16 LUMBAR RADICULOPATHY: Status: ACTIVE | Noted: 2024-08-29

## 2024-08-29 PROCEDURE — 99204 OFFICE O/P NEW MOD 45 MIN: CPT | Performed by: ANESTHESIOLOGY

## 2024-08-29 PROCEDURE — G2211 COMPLEX E/M VISIT ADD ON: HCPCS | Performed by: ANESTHESIOLOGY

## 2024-08-29 PROCEDURE — 97112 NEUROMUSCULAR REEDUCATION: CPT

## 2024-08-29 NOTE — PROGRESS NOTES
"Daily Note     Today's date: 2024  Patient name: Jovani Stratton  : 1955  MRN: [id number]  Referring provider: Gui Brooks MD  Dx:   Encounter Diagnosis     ICD-10-CM    1. Chronic bilateral low back pain without sciatica  M54.50     G89.29       2. Neurogenic claudication due to lumbar spinal stenosis  M48.062                      Subjective: Pt reports 2/10 low back pain, fair HEP compliance.     Objective: See treatment diary below    Assessment: Pt demonstrated increased postural strength and decreased pain levels.     Plan: Cont. POC.     Precautions: PMHx: DM II  Dx: chronic lumbar derangement extension responder, postural abnormalities    Manuals 8/15 8/19 8/22 8/26 8/29        Prone L5,S1 PA mobs Gr III-IV                                                    Neuro Re-Ed             Postural correction and awareness training             Prone lying             SJ hips, hands against table 3x10 4x10 4x10 5x10 5x10        bridges 3x15 4x12 4x12 4x12 4x15                                  TB rows RTB  3x15 G/  3x10 G/  3x10 G/  3x12 G/  3x15        Slouch-overcorrect seated 1x30 3x10 3x10 3x10 3x10                                  Ther Ex             Standing hip flexor stretch 15\"x3 ea 15\"x3 ea 15\"x3 ea 15\"x3 ea 15\"x3 ea                                                                                                   Ther Activity             STS with postural correction             Standing postural correction                          Gait Training             Gait training on floor with postural correction                          Modalities                                          "

## 2024-08-29 NOTE — PROGRESS NOTES
Assessment  1. Lumbar spondylosis    2. Spinal stenosis of lumbar region, unspecified whether neurogenic claudication present        Plan  69-year-old male with a history of diabetes, referred by Dr. Brooks, presenting for initial consultation regarding axial lumbosacral back pain.  He will occasionally get some left buttock and thigh pain after sitting for a prolonged period of time which only last about 5 minutes, whereas his low back pain is constant.  MRI of the lumbar spine demonstrates multilevel spondylosis with varying degrees of central and foraminal stenosis most pronounced at L3-4 and L4-5 centrally and L4-5 and L5-S1 foraminally.  The patient does take Tylenol and ibuprofen as needed with some relief.  He was trialing gabapentin which was ineffective.  Physical therapy does provide some transient relief.  Although the patient does have significant stenosis he denies any radicular symptoms or symptoms consistent with neurogenic claudication.  He does however complain of mechanical axial low back pain which seems to be largely secondary to facet arthrosis.    1.  I did offer the patient bilateral L3-5 medial branch blocks to address the facet mediated component of his low back pain.  If he has a favorable result x 2 we would proceed with RFA for longer lasting relief.  He was unsure if he wanted to proceed with interventional therapy, therefore literature was given for him to review  2.  Patient will continue with PT  3.  Patient may take Tylenol 500 to 1000 mg every 8 hours as needed  4.  Patient may take ibuprofen 600 mg every 8 hours as needed  5.  I will follow-up with the patient in 3 months or sooner if needed      My impressions and treatment recommendations were discussed in detail with the patient who verbalized understanding and had no further questions.  Discharge instructions were provided. I personally saw and examined the patient and I agree with the above discussed plan of care.    No  orders of the defined types were placed in this encounter.    No orders of the defined types were placed in this encounter.      History of Present Illness    Jovani Stratton is a 69 y.o. male with a history of diabetes, referred by Dr. Brooks, presenting for initial consultation regarding axial lumbosacral back pain.  He will occasionally get some left buttock and thigh pain after sitting for a prolonged period of time which only last about 5 minutes, whereas his low back pain is constant.  MRI of the lumbar spine demonstrates multilevel spondylosis with varying degrees of central and foraminal stenosis most pronounced at L3-4 and L4-5 centrally and L4-5 and L5-S1 foraminally.  The patient does take Tylenol and ibuprofen as needed with some relief.  He was trialing gabapentin which was ineffective.  Physical therapy does provide some transient relief.  The patient rates his pain a 2-9 out of 10 depending upon activity and intermittent.  The pain does not follow any particular pattern throughout the day.  The pain is described as dull and aching.  The pain is increased with standing, bending, walking, and transitional movements.  The pain is alleviated with sitting, lying down, and relaxation.      Other than as stated above, the patient denies any interval changes in medications, medical condition, mental condition, symptoms, or allergies since the last office visit.    I have personally reviewed and/or updated the patient's past medical history, past surgical history, family history, social history, current medications, allergies, and vital signs today.     Review of Systems    Patient Active Problem List   Diagnosis    Type 2 diabetes mellitus with microalbuminuria, without long-term current use of insulin (HCC)    Hyperlipidemia    History of thyroid nodule    History of herniated intervertebral disc    Exposure to COVID-19 virus    Sore throat    Chronic low back pain with sciatica    Chronic bilateral low  "back pain without sciatica    Longitudinal overcurvature of nails       Past Medical History:   Diagnosis Date    Candidal intertrigo 10/16/2015    Diabetes mellitus (HCC)     Thyroid nodule 10/16/2015       No past surgical history on file.    No family history on file.    Social History     Occupational History    Not on file   Tobacco Use    Smoking status: Never    Smokeless tobacco: Never   Vaping Use    Vaping status: Never Used   Substance and Sexual Activity    Alcohol use: Yes     Alcohol/week: 1.0 standard drink of alcohol     Types: 1 Cans of beer per week    Drug use: No    Sexual activity: Not on file       Current Outpatient Medications on File Prior to Visit   Medication Sig    atorvastatin (LIPITOR) 40 mg tablet TAKE 1 TABLET (40 MG TOTAL) BY MOUTH IN THE MORNING    azelastine (OPTIVAR) 0.05 % ophthalmic solution     Blood Glucose Monitoring Suppl (FreeStyle Lite) BATSHEVA Check fasting blood sugar every other day in the morning before breakfast.    clotrimazole-betamethasone (LOTRISONE) 1-0.05 % lotion Apply topically 2 (two) times a day    gabapentin (Neurontin) 300 mg capsule Take 1 capsule (300 mg total) by mouth daily at bedtime    glipiZIDE (GLUCOTROL XL) 5 mg 24 hr tablet TAKE 1 TABLET (5 MG TOTAL) BY MOUTH IN THE MORNING    glucose blood (OneTouch Verio) test strip CHECK FASTING BLOOD SUGAR EVERY OTHER DAY IN THE MORNING BEFORE BREAKFAST.    Jardiance 10 MG TABS tablet TAKE 1 TABLET (10 MG TOTAL) BY MOUTH EVERY MORNING.    Lancets (freestyle) lancets Check fasting blood sugar every other day in the morning before breakfast.    lisinopril (ZESTRIL) 5 mg tablet TAKE 1 TABLET (5 MG TOTAL) BY MOUTH DAILY.     No current facility-administered medications on file prior to visit.       Allergies   Allergen Reactions    Acetaminophen Diarrhea, Nausea Only, Dizziness and Lightheadedness     Liquid only        Physical Exam    /76   Pulse 85   Ht 5' 9\" (1.753 m)   Wt 67.7 kg (149 lb 3.2 oz)   BMI " 22.03 kg/m²     Constitutional: normal, well developed, well nourished, alert, in no distress and non-toxic and no overt pain behavior.  Eyes: anicteric  HEENT: grossly intact  Neck: supple, symmetric, trachea midline and no masses   Pulmonary:even and unlabored  Cardiovascular:No edema or pitting edema present  Skin:Normal without rashes or lesions and well hydrated  Psychiatric:Mood and affect appropriate  Neurologic:Cranial Nerves II-XII grossly intact  Musculoskeletal:shuffling gait.  Bilateral lumbar paraspinals mildly tender to palpation from L3-5.  Bilateral SI joints nontender to palpation.  Bilateral trochanteric flares nontender to palpation.  Bilateral patellar and Achilles reflexes were 2/4 and symmetrical.  No clonus was noted bilaterally.  Bilateral lower extremity strength was 5/5 in all muscle groups.  Sensation intact to light touch in L3 thru S1 dermatomes bilaterally.  Negative straight leg raise bilaterally.  Negative Marlon's and Gaenslen's test bilaterally.  Positive lumbar facet loading maneuvers bilaterally.    Imaging        Study Result    Narrative & Impression   MRI LUMBAR SPINE WITHOUT CONTRAST     INDICATION: M48.062: Spinal stenosis, lumbar region with neurogenic claudication.     COMPARISON: 4/22/2024; 3/28/2024     TECHNIQUE:  Multiplanar, multisequence imaging of the lumbar spine was performed. .        IMAGE QUALITY:  Diagnostic     FINDINGS:     VERTEBRAL BODIES:  There are 5 lumbar type vertebral bodies. Moderate levoscoliosis mid lumbar spine centered at the L3 level. Trace grade 1 retrolisthesis of L3 on L4. Alignment is similar to the previous radiograph. Scattered degenerative endplate   changes.  No focally suspicious marrow lesions.  No bone marrow edema or compression abnormality.     SACRUM:  Normal signal within the sacrum. No evidence of insufficiency or stress fracture.     DISTAL CORD AND CONUS:  Normal size and signal within the distal cord and conus. The conus  medullaris terminates at the L1-2 level.     PARASPINAL SOFT TISSUES: Mild asymmetric atrophy of the left psoas muscle.     LOWER THORACIC DISC SPACES:  Normal disc height and signal.  No disc herniation, canal stenosis or foraminal narrowing.     LUMBAR DISC SPACES:     L1-L2: There is bilateral facet hypertrophy. There is a left subarticular zone/neural foraminal disc protrusion. Mild narrowing left subarticular zone and neural foramen. Central canal and right neural foramen patent.     L2-L3: There is loss of disc height. There is bilateral facet hypertrophy which is mild. There is a right neural foraminal disc protrusion. Mild right neural foraminal narrowing. Central canal patent. Mild narrowing left subarticular zone.     L3-L4: There is a central disc herniation, protrusion type extending eccentrically into the right subarticular zone/neural foramen. There is bilateral facet hypertrophy. Severe narrowing of the central canal. Moderate narrowing of the right subarticular   zone and right neural foramen. Mild left neural foraminal narrowing.     L4-L5: There is loss of disc height and signal. There is bilateral facet hypertrophy. There is a central/left paracentral disc protrusion extending into the left subarticular recess and neural foramen. Severe narrowing of the central canal, left   subarticular recess and left neural foramen. Mild narrowing of the right subarticular recess and right neural foramen.     L5-S1: There is bilateral facet hypertrophy. There is a left neural foraminal disc protrusion. Severe narrowing of the left neural foramen. Central canal mildly narrowed. Right neural foramen patent.     OTHER FINDINGS:  None.     IMPRESSION:     Advanced multilevel spondylosis most pronounced at L3-4, L4-5 and L5-S1.        Workstation performed: [id number]       Study Result    Narrative & Impression         LUMBAR SPINE     INDICATION:   Pain, unspecified.      COMPARISON:  None.     VIEWS:  XR SPINE  LUMBAR 2 OR 3 VIEWS INJURY  Images: 2     FINDINGS:     There are 5 non rib bearing lumbar vertebral bodies.      There is no evidence of acute fracture or destructive osseous lesion.     There is mild retrolisthesis of L2 on L3 and L3 on L4.. There is mild kyphosis in the lumbar spine as well     There is moderate disc space narrowing spondylosis in the lumbar spine worse at L2-L3 and L3-L4.. Moderate facet disease lower lumbar spine.     The pedicles appear intact.     Soft tissues are unremarkable.     IMPRESSION:        Moderate multilevel spondylosis worse at L2-L3 and L3-L4 with mild retrolisthesis at these levels.     Electronically signed: 04/22/2024 05:29 PM Star Jarrell MD

## 2024-09-03 ENCOUNTER — OFFICE VISIT (OUTPATIENT)
Dept: PHYSICAL THERAPY | Facility: CLINIC | Age: 69
End: 2024-09-03
Payer: COMMERCIAL

## 2024-09-03 DIAGNOSIS — M48.062 NEUROGENIC CLAUDICATION DUE TO LUMBAR SPINAL STENOSIS: ICD-10-CM

## 2024-09-03 DIAGNOSIS — G89.29 CHRONIC BILATERAL LOW BACK PAIN WITHOUT SCIATICA: Primary | ICD-10-CM

## 2024-09-03 DIAGNOSIS — M54.50 CHRONIC BILATERAL LOW BACK PAIN WITHOUT SCIATICA: Primary | ICD-10-CM

## 2024-09-03 PROCEDURE — 97112 NEUROMUSCULAR REEDUCATION: CPT

## 2024-09-03 NOTE — PROGRESS NOTES
"Daily Note     Today's date: 9/3/2024  Patient name: Jovani Stratton  : 1955  MRN: [id number]  Referring provider: Gui Brooks MD  Dx:   Encounter Diagnosis     ICD-10-CM    1. Chronic bilateral low back pain without sciatica  M54.50     G89.29       2. Neurogenic claudication due to lumbar spinal stenosis  M48.062           Start Time: 1405  Stop Time: 1432  Total time in clinic (min): 27 minutes    Subjective: Pt reports 3/10 pain in L glute into LLE above the knee pre-tx.      Objective: See treatment diary below      Assessment: Tolerated treatment well as indicated by no increase in pain. Minimal c/o fatigue. Cueing for improved posture with standing rows. Pt would benefit from cont skilled PT to decrease pain for improved IND with ADLs.    Plan: Continue per plan of care.      Precautions: PMHx: DM II  Dx: chronic lumbar derangement extension responder, postural abnormalities    Manuals 8/15 8/19 8/22 8/26 8/29 9/3       Prone L5,S1 PA mobs Gr III-IV                                                    Neuro Re-Ed             Postural correction and awareness training             Prone lying             SJ hips, hands against table 3x10 4x10 4x10 5x10 5x10 5x10       bridges 3x15 4x12 4x12 4x12 4x15 4x15                                 TB rows RTB  3x15 G/  3x10 G/  3x10 G/  3x12 G/  3x15 G/ 3x15       Slouch-overcorrect seated 1x30 3x10 3x10 3x10 3x10 3x10                                 Ther Ex             Standing hip flexor stretch 15\"x3 ea 15\"x3 ea 15\"x3 ea 15\"x3 ea 15\"x3 ea 15\"x3 ea                                                                                                  Ther Activity             STS with postural correction             Standing postural correction                          Gait Training             Gait training on floor with postural correction                          Modalities                                            "

## 2024-09-05 ENCOUNTER — OFFICE VISIT (OUTPATIENT)
Dept: PHYSICAL THERAPY | Facility: CLINIC | Age: 69
End: 2024-09-05
Payer: COMMERCIAL

## 2024-09-05 DIAGNOSIS — M48.062 NEUROGENIC CLAUDICATION DUE TO LUMBAR SPINAL STENOSIS: ICD-10-CM

## 2024-09-05 DIAGNOSIS — M54.50 CHRONIC BILATERAL LOW BACK PAIN WITHOUT SCIATICA: Primary | ICD-10-CM

## 2024-09-05 DIAGNOSIS — G89.29 CHRONIC BILATERAL LOW BACK PAIN WITHOUT SCIATICA: Primary | ICD-10-CM

## 2024-09-05 PROCEDURE — 97112 NEUROMUSCULAR REEDUCATION: CPT

## 2024-09-05 NOTE — PROGRESS NOTES
"Daily Note     Today's date: 2024  Patient name: Jovani Stratton  : 1955  MRN: [id number]  Referring provider: Gui Brooks MD  Dx:   Encounter Diagnosis     ICD-10-CM    1. Chronic bilateral low back pain without sciatica  M54.50     G89.29       2. Neurogenic claudication due to lumbar spinal stenosis  M48.062                      Subjective: Pt reports 1-2/10 low back pain pre tx. Pt reports he does experience times where his pain levels increase to 8/10 with increased forward bending. Pt continues to experience episodes of  L LE radicular pain sometimes with sitting.       Objective: See treatment diary below      Assessment: Pt required moderate cueing to correct posture with TB rows. Pt demonstrated limited endurance with postural strengthening.     Plan: Continue per plan of care.      Precautions: PMHx: DM II  Dx: chronic lumbar derangement extension responder, postural abnormalities    Manuals 8/15 8/19 8/22 8/26 8/29 9/3 9/5      Prone L5,S1 PA mobs Gr III-IV                                                    Neuro Re-Ed             Postural correction and awareness training             Prone lying             SJ hips, hands against table 3x10 4x10 4x10 5x10 5x10 5x10 5x10      bridges 3x15 4x12 4x12 4x12 4x15 4x15 4x15                                TB rows RTB  3x15 G/  3x10 G/  3x10 G/  3x12 G/  3x15 G/ 3x15 B/  3x15      Slouch-overcorrect seated 1x30 3x10 3x10 3x10 3x10 3x10 3x10                                Ther Ex             Standing hip flexor stretch 15\"x3 ea 15\"x3 ea 15\"x3 ea 15\"x3 ea 15\"x3 ea 15\"x3 ea 15\"x3 ea                                                                                                 Ther Activity             STS with postural correction             Standing postural correction                          Gait Training             Gait training on floor with postural correction                          Modalities                                          "

## 2024-09-09 ENCOUNTER — OFFICE VISIT (OUTPATIENT)
Dept: PHYSICAL THERAPY | Facility: CLINIC | Age: 69
End: 2024-09-09
Payer: COMMERCIAL

## 2024-09-09 DIAGNOSIS — M48.062 NEUROGENIC CLAUDICATION DUE TO LUMBAR SPINAL STENOSIS: ICD-10-CM

## 2024-09-09 DIAGNOSIS — M54.50 CHRONIC BILATERAL LOW BACK PAIN WITHOUT SCIATICA: Primary | ICD-10-CM

## 2024-09-09 DIAGNOSIS — G89.29 CHRONIC BILATERAL LOW BACK PAIN WITHOUT SCIATICA: Primary | ICD-10-CM

## 2024-09-09 PROCEDURE — 97112 NEUROMUSCULAR REEDUCATION: CPT

## 2024-09-09 NOTE — PROGRESS NOTES
"Daily Note     Today's date: 2024  Patient name: Jovani Stratton  : 1955  MRN: [id number]  Referring provider: Gui Brooks MD  Dx:   Encounter Diagnosis     ICD-10-CM    1. Chronic bilateral low back pain without sciatica  M54.50     G89.29       2. Neurogenic claudication due to lumbar spinal stenosis  M48.062                      Subjective: Pt reports no low back pain pre tx. He reports poor HEP compliance over the weekend.      Objective: See treatment diary below      Assessment: Pt demonstrated decreased fatigue with postural strengthening. He continues to have difficulty maintaining corrected posture.    Plan: Continue per plan of care.      Precautions: PMHx: DM II  Dx: chronic lumbar derangement extension responder, postural abnormalities    Manuals 8/15 8/19 8/22 8/26 8/29 9/3 9/5      Prone L5,S1 PA mobs Gr III-IV                                                    Neuro Re-Ed             Postural correction and awareness training             Prone lying             SJ hips, hands against table 3x10 4x10 4x10 5x10 5x10 5x10 5x10      bridges 3x15 4x12 4x12 4x12 4x15 4x15 4x15                                TB rows RTB  3x15 G/  3x10 G/  3x10 G/  3x12 G/  3x15 G/ 3x15 B/  3x15      Slouch-overcorrect seated 1x30 3x10 3x10 3x10 3x10 3x10 3x10                                Ther Ex             Standing hip flexor stretch 15\"x3 ea 15\"x3 ea 15\"x3 ea 15\"x3 ea 15\"x3 ea 15\"x3 ea 15\"x3 ea                                                                                                 Ther Activity             STS with postural correction             Standing postural correction                          Gait Training             Gait training on floor with postural correction                          Modalities                                            "

## 2024-09-11 ENCOUNTER — OFFICE VISIT (OUTPATIENT)
Dept: OBGYN CLINIC | Facility: HOSPITAL | Age: 69
End: 2024-09-11
Payer: COMMERCIAL

## 2024-09-11 VITALS
BODY MASS INDEX: 22.11 KG/M2 | WEIGHT: 149.25 LBS | SYSTOLIC BLOOD PRESSURE: 131 MMHG | HEIGHT: 69 IN | DIASTOLIC BLOOD PRESSURE: 71 MMHG | HEART RATE: 102 BPM

## 2024-09-11 DIAGNOSIS — M48.062 NEUROGENIC CLAUDICATION DUE TO LUMBAR SPINAL STENOSIS: Primary | ICD-10-CM

## 2024-09-11 PROCEDURE — 99212 OFFICE O/P EST SF 10 MIN: CPT | Performed by: ORTHOPAEDIC SURGERY

## 2024-09-12 ENCOUNTER — OFFICE VISIT (OUTPATIENT)
Dept: PHYSICAL THERAPY | Facility: CLINIC | Age: 69
End: 2024-09-12
Payer: COMMERCIAL

## 2024-09-12 DIAGNOSIS — M54.50 CHRONIC BILATERAL LOW BACK PAIN WITHOUT SCIATICA: Primary | ICD-10-CM

## 2024-09-12 DIAGNOSIS — M48.062 NEUROGENIC CLAUDICATION DUE TO LUMBAR SPINAL STENOSIS: ICD-10-CM

## 2024-09-12 DIAGNOSIS — G89.29 CHRONIC BILATERAL LOW BACK PAIN WITHOUT SCIATICA: Primary | ICD-10-CM

## 2024-09-12 PROCEDURE — 97112 NEUROMUSCULAR REEDUCATION: CPT

## 2024-09-12 NOTE — PROGRESS NOTES
"Daily Note     Today's date: 2024  Patient name: Jovani Stratton  : 1955  MRN: [id number]  Referring provider: Gui Brooks MD  Dx:   Encounter Diagnosis     ICD-10-CM    1. Chronic bilateral low back pain without sciatica  M54.50     G89.29       2. Neurogenic claudication due to lumbar spinal stenosis  M48.062                      Subjective: Pt reports no mild low back pain pre tx. Pt reports he continues to experience L posterior hip pain with sitting.      Objective: See treatment diary below      Assessment: Pt demonstrated difficulty maintaining good posture with TB rows. Pt demonstrates poor HEP compliance which is slowing progress with meeting PT goals.     Plan: Continue per plan of care.      Precautions: PMHx: DM II  Dx: chronic lumbar derangement extension responder, postural abnormalities    Manuals 8/15 8/19 8/22 8/26 8/29 9/3 9/5 9/12     Prone L5,S1 PA mobs Gr III-IV                                                    Neuro Re-Ed             Postural correction and awareness training             Prone lying             SJ hips, hands against table 3x10 4x10 4x10 5x10 5x10 5x10 5x10 5x10     bridges 3x15 4x12 4x12 4x12 4x15 4x15 4x15 4x15                               TB rows RTB  3x15 G/  3x10 G/  3x10 G/  3x12 G/  3x15 G/ 3x15 B/  3x15 B/  3x15     Slouch-overcorrect seated 1x30 3x10 3x10 3x10 3x10 3x10 3x10 3x10                               Ther Ex             Standing hip flexor stretch 15\"x3 ea 15\"x3 ea 15\"x3 ea 15\"x3 ea 15\"x3 ea 15\"x3 ea 15\"x3 ea 15\"x3 ea                                                                                                Ther Activity             STS with postural correction             Standing postural correction                          Gait Training             Gait training on floor with postural correction                          Modalities                                            "

## 2024-09-12 NOTE — PROGRESS NOTES
Assessment & Plan/Medical Decision Makin y.o. male with Back Pain, Ambulatory Dysfunction, and Neurogenic Claudication and imaging findings most notable for lumbar spondylosis  and L3-4, L4-5 disc degeneration          The clinical, physical and imaging findings were reviewed with the patient.  Jovani has a constellation of findings consistent with Lumbar Myofascial Pain, Ambulatory Dysfunction, and Neurogenic Claudication in the setting of lumbar degenerative disease.      Fortunately patient remains functional. Physical exam showing limitation in extension and lower extremity weakness. We discussed the treatment options including physical therapy, at home exercises, activity modifications, chiropractic medicine, oral medications, interventional spine procedures.  We did again discuss the role of surgical intervention given his severe spinal stenosis.      He was encouraged to continue physical therapy.  He was also encouraged to consider treatment with Dr. Toscano.  Reviewed again that surgery is an option but would not provide him with more mobility which patient was concerned about.     Patient instructed to return to office/ER sooner if symptoms are not improving, getting worse, or new worrisome/neurologic symptoms arise.  Patient will follow up as needed.      Subjective:      Chief Complaint: Back Pain    HPI:  Jovani Stratton is a 69 y.o. male presenting for initial visit with chief complaint of lower back pain for several years. Previously had a history of herniated discs in the , but his pain and radicular symptoms improved following PT at that time. He does not have radicular symptoms at this time.      Describes pain as dull and achy in nature.  Located in low back.  Denies numbness . Denies burning.  Back pain 100%, Leg pain 0%.  L > R. Pain is worse with prolonged standing and walking and improves with rest.    Denies any hussain trauma. Denies fever or chills, no night sweats. Denies any  bladder or bowel changes.      Conservative therapy includes the following:   Medications: Motrin    Injections: none  Physical Therapy: in the past, nothing recent  Chiropractic Medicine: has not attempted  Accupunture/Massage Therapy: has not attempted   These therapeutic modalities were ineffective at providing sustained pain relief/functional improvement.     Nicotine dependent: No  Occupation: Retired  Living situation: Lives with family   ADLs: patient is able to perform     Update 5/28/24  Presents for follow up to discuss MRI results. He reports since previous appointment, his back pain is slightly worse. He denies leg pain, denies numbness or paresthesias. 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 He occasionally takes ibuprofen, but it does not relieve his symptoms.     Update 07/30/2024  69 y.o. male presents to the office for follow up of low back pain. Since last visit he has been taking gabapentin and started physical therapy. He feels that physical therapy is helping him stand more erect and walk slightly longer but still has pain. He also notes that he has had some side effects from the gabapentin. He continues to have difficulty with forward bending and doing his yard work. He also struggles with his balance and notes a fall a few weeks ago.    Update 9/11/2024  Patient is here for follow-up.  He was evaluated by pain management and recommended for medial branch blocks.  He is considering this option.  He continues to use a cane and has low back pain.    Objective:     History reviewed. No pertinent family history.    Past Medical History:   Diagnosis Date    Candidal intertrigo 10/16/2015    Diabetes mellitus (HCC)     Thyroid nodule 10/16/2015       Current Outpatient Medications   Medication Sig Dispense Refill    atorvastatin (LIPITOR) 40 mg tablet TAKE 1 TABLET (40 MG TOTAL) BY  MOUTH IN THE MORNING 90 tablet 2    azelastine (OPTIVAR) 0.05 % ophthalmic solution  (Patient not taking: Reported on 8/29/2024)      Blood Glucose Monitoring Suppl (FreeStyle Lite) BATSHEVA Check fasting blood sugar every other day in the morning before breakfast. 1 each 0    clotrimazole-betamethasone (LOTRISONE) 1-0.05 % lotion Apply topically 2 (two) times a day 30 mL 0    gabapentin (Neurontin) 300 mg capsule Take 1 capsule (300 mg total) by mouth daily at bedtime 30 capsule 0    glipiZIDE (GLUCOTROL XL) 5 mg 24 hr tablet TAKE 1 TABLET (5 MG TOTAL) BY MOUTH IN THE MORNING 90 tablet 2    glucose blood (OneTouch Verio) test strip CHECK FASTING BLOOD SUGAR EVERY OTHER DAY IN THE MORNING BEFORE BREAKFAST. 25 strip 11    Jardiance 10 MG TABS tablet TAKE 1 TABLET (10 MG TOTAL) BY MOUTH EVERY MORNING. 90 tablet 3    Lancets (freestyle) lancets Check fasting blood sugar every other day in the morning before breakfast. 300 each 1    lisinopril (ZESTRIL) 5 mg tablet TAKE 1 TABLET (5 MG TOTAL) BY MOUTH DAILY. 90 tablet 0     No current facility-administered medications for this visit.       History reviewed. No pertinent surgical history.    Social History     Socioeconomic History    Marital status: Single     Spouse name: Not on file    Number of children: Not on file    Years of education: Not on file    Highest education level: Not on file   Occupational History    Not on file   Tobacco Use    Smoking status: Never    Smokeless tobacco: Never   Vaping Use    Vaping status: Never Used   Substance and Sexual Activity    Alcohol use: Yes     Alcohol/week: 1.0 standard drink of alcohol     Types: 1 Cans of beer per week    Drug use: No    Sexual activity: Not on file   Other Topics Concern    Not on file   Social History Narrative    Not on file     Social Determinants of Health     Financial Resource Strain: Medium Risk (12/24/2023)    Overall Financial Resource Strain (CARDIA)     Difficulty of Paying Living Expenses:  "Somewhat hard   Food Insecurity: Food Insecurity Present (12/24/2023)    Hunger Vital Sign     Worried About Running Out of Food in the Last Year: Sometimes true     Ran Out of Food in the Last Year: Never true   Transportation Needs: No Transportation Needs (12/24/2023)    PRAPARE - Transportation     Lack of Transportation (Medical): No     Lack of Transportation (Non-Medical): No   Physical Activity: Not on file   Stress: No Stress Concern Present (12/26/2023)    [employer] [place] [employer] - Occupational Stress Questionnaire     Feeling of Stress : Not at all   Social Connections: Unknown (6/18/2024)    Received from [employer]     How often do you feel lonely or isolated from those around you? (Adult - for ages 18 years and over): Not on file   Intimate Partner Violence: Not At Risk (12/26/2023)    Humiliation, Afraid, Rape, and Kick questionnaire     Fear of Current or Ex-Partner: No     Emotionally Abused: No     Physically Abused: No     Sexually Abused: No   Housing Stability: Low Risk  (12/26/2023)    Housing Stability Vital Sign     Unable to Pay for Housing in the Last Year: No     Number of Times Moved in the Last Year: 1     Homeless in the Last Year: No       Allergies   Allergen Reactions    Acetaminophen Diarrhea, Nausea Only, Dizziness and Lightheadedness     Liquid only        Review of Systems  General- denies fever/chills  HEENT- denies hearing loss or sore throat  Eyes- denies eye pain or visual disturbances, denies red eyes  Respiratory- denies cough or SOB  Cardio- denies chest pain or palpitations  GI- denies abdominal pain  Endocrine- denies urinary frequency  Urinary- denies pain with urination  Musculoskeletal- Negative except noted above  Skin- denies rashes or wounds  Neurological- denies dizziness or headache  Psychiatric- denies anxiety or difficulty concentrating    Physical Exam  /71   Pulse 102   Ht 5' 9\" (1.753 m)   Wt 67.7 kg (149 lb 4 " oz)   BMI 22.04 kg/m²     General/Constitutional: No apparent distress: well-nourished and well developed.  Lymphatic: No appreciable lymphadenopathy  Respiratory: Non-labored breathing  Vascular: No edema, swelling or tenderness, except as noted in detailed exam.  Integumentary: No impressive skin lesions present, except as noted in detailed exam.  Psych: Normal mood and affect, oriented to person, place and time.  MSK: normal other than stated in HPI and exam  Gait & balance: no evidence of myelopathic gait, ambulates with a Cane    Lumbar spine range of motion:  -Forward flexion to 90  -Extension to neutral  -Lateral bend 25 right, 25 left  -Rotation 25 right, 25 left  There tenderness with palpation along lumbar paraspinal musculature, no midline tenderness     Neurologic:    Lower Extremity Motor Function    Right  Left    Iliopsoas  5/5  5/5    Quadriceps 4/5 4/5   Tibialis anterior  4/5  4/5    EHL  4+/5  4+/5    Gastroc. muscle  5/5  5/5    Heel rise  4/5  4/5    Toe rise  4/5  4/5      Sensory: light touch is intact to bilateral upper and lower extremities     Reflexes:    Right Left   Patellar 0 1+   Achilles 0 0   Babinski neg neg     Other tests:  Straight Leg Raise: negative  Fannie SI: negative  NATALY SI: negative  Greater troch: no tenderness   Internal/external hip ROM: intact, no pain   Flexion/extension knee ROM: intact, no pain   Vascular: WWP extremities, 2+DP bilateral      Diagnostic Tests   IMAGING: I have personally reviewed the images and these are my findings:  Lumbar Spine X-rays from 3/28 and 4/22/24: multi level lumbar spondylosis with loss of disc height, osteophyte formation and facet hypertrophy, no apparent spondylolisthesis, no appreciated lytic/blastic lesions, no obvious instability    CT Renal Stone Study from 11/28/2019: calcified discs with lumbar spondylosis, loss of disc height, and severe central stenosis at L3-4 and L4-5.    Lumbar MRI from 5/19/24: multi level lumbar  "spondylosis with loss of disc height, osteophyte formation and facet hypertrophy, spinal stenosis at L3-4 and L4-5, no apparent spondylolisthesis, no appreciated lytic or blastic lesions, no obvious instability     Electronic Medical Records were reviewed including imaging studies and PMHx    Procedures, if performed today     None performed       Portions of the record may have been created with voice recognition software.  Occasional wrong word or \"sound a like\" substitutions may have occurred due to the inherent limitations of voice recognition software.  Read the chart carefully and recognize, using context, where substitutions have occurred.  "

## 2024-09-16 ENCOUNTER — OFFICE VISIT (OUTPATIENT)
Dept: PHYSICAL THERAPY | Facility: CLINIC | Age: 69
End: 2024-09-16
Payer: COMMERCIAL

## 2024-09-16 DIAGNOSIS — G89.29 CHRONIC BILATERAL LOW BACK PAIN WITHOUT SCIATICA: Primary | ICD-10-CM

## 2024-09-16 DIAGNOSIS — M54.50 CHRONIC BILATERAL LOW BACK PAIN WITHOUT SCIATICA: Primary | ICD-10-CM

## 2024-09-16 DIAGNOSIS — M48.062 NEUROGENIC CLAUDICATION DUE TO LUMBAR SPINAL STENOSIS: ICD-10-CM

## 2024-09-16 PROCEDURE — 97112 NEUROMUSCULAR REEDUCATION: CPT | Performed by: PHYSICAL THERAPIST

## 2024-09-16 NOTE — PROGRESS NOTES
"Daily Note     Today's date: 2024  Patient name: Jovani Stratton  : 1955  MRN: [id number]  Referring provider: Gui Brooks MD  Dx:   Encounter Diagnosis     ICD-10-CM    1. Chronic bilateral low back pain without sciatica  M54.50     G89.29       2. Neurogenic claudication due to lumbar spinal stenosis  M48.062                      Subjective: Pt reports increased difficulty straightening up to walk for the past 2 wks. He reports [id number] compliance with HEP.    Objective: See treatment diary below    Educated pt on importance on HEP especially SJ several sessions t/o the day    Assessment: Pt very resistant to modifying his daily routine to accommodate improved HEP compliance. Pt requires adjustable table support and OP to approach a neutral spine during SJ.     Plan: Cont POC.     Precautions: PMHx: DM II  Dx: chronic lumbar derangement extension responder, postural abnormalities    Manuals 8/15 8/19 8/22 8/26 8/29 9/3 9/5 9/12 9/16    Prone L5,S1 PA mobs Gr III-IV                                                    Neuro Re-Ed             Postural correction and awareness training             Prone lying             SJ hips, hands against table 3x10 4x10 4x10 5x10 5x10 5x10 5x10 5x10 5x10    bridges 3x15 4x12 4x12 4x12 4x15 4x15 4x15 4x15 3x20                              TB rows RTB  3x15 G/  3x10 G/  3x10 G/  3x12 G/  3x15 G/ 3x15 B/  3x15 B/  3x15 Blue  3x15    Slouch-overcorrect seated 1x30 3x10 3x10 3x10 3x10 3x10 3x10 3x10 3x10                              Ther Ex             Standing hip flexor stretch 15\"x3 ea 15\"x3 ea 15\"x3 ea 15\"x3 ea 15\"x3 ea 15\"x3 ea 15\"x3 ea 15\"x3 ea 10\"x5 ea                                                                                               Ther Activity             STS with postural correction             Standing postural correction                          Gait Training             Gait training on floor with postural correction              "             Modalities

## 2024-09-19 ENCOUNTER — OFFICE VISIT (OUTPATIENT)
Dept: PHYSICAL THERAPY | Facility: CLINIC | Age: 69
End: 2024-09-19
Payer: COMMERCIAL

## 2024-09-19 DIAGNOSIS — M48.062 NEUROGENIC CLAUDICATION DUE TO LUMBAR SPINAL STENOSIS: ICD-10-CM

## 2024-09-19 DIAGNOSIS — M54.50 CHRONIC BILATERAL LOW BACK PAIN WITHOUT SCIATICA: Primary | ICD-10-CM

## 2024-09-19 DIAGNOSIS — G89.29 CHRONIC BILATERAL LOW BACK PAIN WITHOUT SCIATICA: Primary | ICD-10-CM

## 2024-09-19 PROCEDURE — 97112 NEUROMUSCULAR REEDUCATION: CPT

## 2024-09-19 PROCEDURE — 97110 THERAPEUTIC EXERCISES: CPT

## 2024-09-19 NOTE — PROGRESS NOTES
"Daily Note     Today's date: 2024  Patient name: Jovani Stratton  : 1955  MRN: [id number]  Referring provider: uGi Brooks MD  Dx:   Encounter Diagnosis     ICD-10-CM    1. Chronic bilateral low back pain without sciatica  M54.50     G89.29       2. Neurogenic claudication due to lumbar spinal stenosis  M48.062                      Subjective: Pt reports performing HEP 3 times per day for 2 days. Pt reports he felt good since last treatment but he continues to experience L LE symptoms with sitting. He reports 2/10 L LE pain pre tx.     Objective: See treatment diary below      Assessment: Pt demonstrated a need for increased frequency of HEP to address L LE pain. Pt demonstrated limited lumbar extension ROM with SJ but was able to abolish L LE radicular symptoms.     Plan: Cont POC.     Precautions: PMHx: DM II  Dx: chronic lumbar derangement extension responder, postural abnormalities    Manuals 8/15 8/19 8/22 8/26 8/29 9/3 9/5 9/12 9/16 9/19   Prone L5,S1 PA mobs Gr III-IV                                                    Neuro Re-Ed             Postural correction and awareness training             Prone lying             SJ hips, hands against table 3x10 4x10 4x10 5x10 5x10 5x10 5x10 5x10 5x10 6x10   bridges 3x15 4x12 4x12 4x12 4x15 4x15 4x15 4x15 3x20 3x20                             TB rows RTB  3x15 G/  3x10 G/  3x10 G/  3x12 G/  3x15 G/ 3x15 B/  3x15 B/  3x15 Blue  3x15 Blue  3x15   Slouch-overcorrect seated 1x30 3x10 3x10 3x10 3x10 3x10 3x10 3x10 3x10 3x10                             Ther Ex             Standing hip flexor stretch 15\"x3 ea 15\"x3 ea 15\"x3 ea 15\"x3 ea 15\"x3 ea 15\"x3 ea 15\"x3 ea 15\"x3 ea 10\"x5 ea 10\"x5 ea                                                                                              Ther Activity             STS with postural correction             Standing postural correction                          Gait Training             Gait training on floor with " postural correction                          Modalities

## 2024-09-23 ENCOUNTER — OFFICE VISIT (OUTPATIENT)
Dept: PHYSICAL THERAPY | Facility: CLINIC | Age: 69
End: 2024-09-23
Payer: COMMERCIAL

## 2024-09-23 DIAGNOSIS — G89.29 CHRONIC BILATERAL LOW BACK PAIN WITHOUT SCIATICA: Primary | ICD-10-CM

## 2024-09-23 DIAGNOSIS — M48.062 NEUROGENIC CLAUDICATION DUE TO LUMBAR SPINAL STENOSIS: ICD-10-CM

## 2024-09-23 DIAGNOSIS — M54.50 CHRONIC BILATERAL LOW BACK PAIN WITHOUT SCIATICA: Primary | ICD-10-CM

## 2024-09-23 PROCEDURE — 97112 NEUROMUSCULAR REEDUCATION: CPT

## 2024-09-23 NOTE — PROGRESS NOTES
"Daily Note     Today's date: 2024  Patient name: Jovani Stratton  : 1955  MRN: [id number]  Referring provider: Gui Brooks MD  Dx:   Encounter Diagnosis     ICD-10-CM    1. Chronic bilateral low back pain without sciatica  M54.50     G89.29       2. Neurogenic claudication due to lumbar spinal stenosis  M48.062                      Subjective: Pt reports no pain pre tx. Pt reports no change in frequency or intensity of LE radicular symptoms. Pt reports no pain currently.     Objective: See treatment diary below      Assessment: Pt continues to require cueing for postural correction with standing TE. Pt demonstrated no change in symptoms and fair HEP compliance.     Plan: Cont POC.     Precautions: PMHx: DM II  Dx: chronic lumbar derangement extension responder, postural abnormalities    Manuals 9/23   8/26 8/29 9/3 9/5 9/12 9/16 9/19   Prone L5,S1 PA mobs Gr III-IV                                                    Neuro Re-Ed             Postural correction and awareness training             Prone lying             SJ hips, hands against table 5x10   5x10 5x10 5x10 5x10 5x10 5x10 6x10   bridges 3x20   4x12 4x15 4x15 4x15 4x15 3x20 3x20                             TB rows Blue  3x15   G/  3x12 G/  3x15 G/ 3x15 B/  3x15 B/  3x15 Blue  3x15 Blue  3x15   Slouch-overcorrect seated 3x10   3x10 3x10 3x10 3x10 3x10 3x10 3x10                             Ther Ex             Standing hip flexor stretch 10\"x5 ea   15\"x3 ea 15\"x3 ea 15\"x3 ea 15\"x3 ea 15\"x3 ea 10\"x5 ea 10\"x5 ea                                                                                              Ther Activity             STS with postural correction             Standing postural correction                          Gait Training             Gait training on floor with postural correction                          Modalities                                            "

## 2024-09-26 ENCOUNTER — OFFICE VISIT (OUTPATIENT)
Dept: PHYSICAL THERAPY | Facility: CLINIC | Age: 69
End: 2024-09-26
Payer: COMMERCIAL

## 2024-09-26 DIAGNOSIS — M48.062 NEUROGENIC CLAUDICATION DUE TO LUMBAR SPINAL STENOSIS: ICD-10-CM

## 2024-09-26 DIAGNOSIS — M54.50 CHRONIC BILATERAL LOW BACK PAIN WITHOUT SCIATICA: Primary | ICD-10-CM

## 2024-09-26 DIAGNOSIS — G89.29 CHRONIC BILATERAL LOW BACK PAIN WITHOUT SCIATICA: Primary | ICD-10-CM

## 2024-09-26 PROCEDURE — 97112 NEUROMUSCULAR REEDUCATION: CPT | Performed by: PHYSICAL THERAPIST

## 2024-09-26 NOTE — PROGRESS NOTES
"Daily Note     Today's date: 2024  Patient name: Jovani Stratton  : 1955  MRN: [id number]  Referring provider: Gui Brooks MD  Dx:   Encounter Diagnosis     ICD-10-CM    1. Chronic bilateral low back pain without sciatica  M54.50     G89.29       2. Neurogenic claudication due to lumbar spinal stenosis  M48.062                      Subjective: Pt reports 7/10 L LBP radiating into the post L thigh upon getting up from prolonged sitting. He notes mildly improved walking tolerance over the past 2 wks.    Objective: See treatment diary below    TELMA SANDRA    Assessment: Pt continues to demonstrate an extension DP but lacks sufficient HEP performance to further improve ROM, function, and pain control.    Plan: Cont POC.     Precautions: PMHx: DM II  Dx: chronic lumbar derangement extension responder, postural abnormalities    Manuals            Prone L5,S1 PA mobs Gr III-IV                                                    Neuro Re-Ed             Postural correction and awareness training             Prone lying             SJ hips, hands against table 5x10 1x20  3x10 6x10          bridges 3x20 3x22                                     TB rows Blue  3x15 Blue  3x20           Slouch-overcorrect seated 3x10 3x15                                     Ther Ex             Standing hip flexor stretch 10\"x5 ea 10\"x5 ea                                                                                                      Ther Activity             STS with postural correction             Standing postural correction                          Gait Training             Gait training on floor with postural correction                          Modalities                                            "

## 2024-09-30 ENCOUNTER — OFFICE VISIT (OUTPATIENT)
Dept: PHYSICAL THERAPY | Facility: CLINIC | Age: 69
End: 2024-09-30
Payer: COMMERCIAL

## 2024-09-30 DIAGNOSIS — G89.29 CHRONIC BILATERAL LOW BACK PAIN WITHOUT SCIATICA: Primary | ICD-10-CM

## 2024-09-30 DIAGNOSIS — M48.062 NEUROGENIC CLAUDICATION DUE TO LUMBAR SPINAL STENOSIS: ICD-10-CM

## 2024-09-30 DIAGNOSIS — M54.50 CHRONIC BILATERAL LOW BACK PAIN WITHOUT SCIATICA: Primary | ICD-10-CM

## 2024-09-30 PROCEDURE — 97112 NEUROMUSCULAR REEDUCATION: CPT | Performed by: PHYSICAL THERAPIST

## 2024-09-30 NOTE — PROGRESS NOTES
"Daily Note     Today's date: 2024  Patient name: Jovani Stratton  : 1955  MRN: [id number]  Referring provider: Gui Brooks MD  Dx:   Encounter Diagnosis     ICD-10-CM    1. Chronic bilateral low back pain without sciatica  M54.50     G89.29       2. Neurogenic claudication due to lumbar spinal stenosis  M48.062                      Subjective: Pt reports severe L post upper thigh pain upon waking in supine or L SL that does not change with straightening his leg.    Objective: See treatment diary below    Instructed pt to attempt more sleeping in R SL    Assessment: Pt demonstrated NE, NE with SJ, no change in ROM.    Plan: Cont POC.     Precautions: PMHx: DM II  Dx: chronic lumbar derangement extension responder, postural abnormalities    Manuals           Prone L5,S1 PA mobs Gr III-IV                                                    Neuro Re-Ed             Postural correction and awareness training             Prone lying             SJ hips, hands against table 5x10 1x20  3x10 6x10          bridges 3x20 3x22 3x22                                    TB rows Blue  3x15 Blue  3x20 Blue  3x20          Slouch-overcorrect seated 3x10 3x15 3x15                                    Ther Ex             Standing hip flexor stretch 10\"x5 ea 10\"x5 ea 10\"x5 ea                                                                                                     Ther Activity             STS with postural correction             Standing postural correction                          Gait Training             Gait training on floor with postural correction                          Modalities                                            "

## 2024-10-03 ENCOUNTER — EVALUATION (OUTPATIENT)
Dept: PHYSICAL THERAPY | Facility: CLINIC | Age: 69
End: 2024-10-03
Payer: COMMERCIAL

## 2024-10-03 DIAGNOSIS — M48.062 NEUROGENIC CLAUDICATION DUE TO LUMBAR SPINAL STENOSIS: ICD-10-CM

## 2024-10-03 DIAGNOSIS — M54.50 CHRONIC BILATERAL LOW BACK PAIN WITHOUT SCIATICA: Primary | ICD-10-CM

## 2024-10-03 DIAGNOSIS — G89.29 CHRONIC BILATERAL LOW BACK PAIN WITHOUT SCIATICA: Primary | ICD-10-CM

## 2024-10-03 PROCEDURE — 97112 NEUROMUSCULAR REEDUCATION: CPT | Performed by: PHYSICAL THERAPIST

## 2024-10-03 NOTE — PROGRESS NOTES
"Daily Note     Today's date: 10/3/2024  Patient name: Jovani Stratton  : 1955  MRN: [id number]  Referring provider: Gui Brooks MD  Dx:   Encounter Diagnosis     ICD-10-CM    1. Chronic bilateral low back pain without sciatica  M54.50     G89.29       2. Neurogenic claudication due to lumbar spinal stenosis  M48.062                      Subjective: Pt reports no LE symptoms x 2 days.    Objective: See treatment diary below    Assessment: Pt demonstrated less pain, no change in postural correction or ROM.    Plan: Cont POC.     Precautions: PMHx: DM II  Dx: chronic lumbar derangement extension responder, postural abnormalities    Manuals 9/23 9/26 9/30 10/2         Prone L5,S1 PA mobs Gr III-IV                                                    Neuro Re-Ed             Postural correction and awareness training             Prone lying             SJ hips, hands against table 5x10 1x20  3x10 6x10 6x10         bridges 3x20 3x22 3x22 3x22                                   TB rows Blue  3x15 Blue  3x20 Blue  3x20 Blue  3x20         Slouch-overcorrect seated 3x10 3x15 3x15 3x15 ea                                   Ther Ex             Standing hip flexor stretch 10\"x5 ea 10\"x5 ea 10\"x5 ea 10\"x5 ea                                                                                                    Ther Activity             STS with postural correction    1x10         Standing postural correction                          Gait Training             Gait training on floor with postural correction                          Modalities                                            "

## 2024-10-07 ENCOUNTER — OFFICE VISIT (OUTPATIENT)
Dept: PHYSICAL THERAPY | Facility: CLINIC | Age: 69
End: 2024-10-07
Payer: COMMERCIAL

## 2024-10-07 DIAGNOSIS — G89.29 CHRONIC BILATERAL LOW BACK PAIN WITHOUT SCIATICA: Primary | ICD-10-CM

## 2024-10-07 DIAGNOSIS — M54.50 CHRONIC BILATERAL LOW BACK PAIN WITHOUT SCIATICA: Primary | ICD-10-CM

## 2024-10-07 DIAGNOSIS — M48.062 NEUROGENIC CLAUDICATION DUE TO LUMBAR SPINAL STENOSIS: ICD-10-CM

## 2024-10-07 PROCEDURE — 97112 NEUROMUSCULAR REEDUCATION: CPT

## 2024-10-07 NOTE — PROGRESS NOTES
"Daily Note     Today's date: 10/7/2024  Patient name: Jovani Stratton  : 1955  MRN: [id number]  Referring provider: Gui Brooks MD  Dx:   Encounter Diagnosis     ICD-10-CM    1. Chronic bilateral low back pain without sciatica  M54.50     G89.29       2. Neurogenic claudication due to lumbar spinal stenosis  M48.062                      Subjective: Patient noted that he had a lot of yard work that he was working on over the weekend. Patient noted pain in back post yard work. Patient noted his pain management appointment is scheduled for Thursday.      Objective: See treatment diary below      Assessment: Tolerated treatment fair. Patient needed VC throughout treatment for correction of form and for correct dosage of exercise. Patient noted feeling soreness post treatment in lumbar.       Plan: Continue per plan of care.      Precautions: PMHx: DM II  Dx: chronic lumbar derangement extension responder, postural abnormalities    Manuals 9/23 9/26 9/30 10/2 10/7        Prone L5,S1 PA mobs Gr III-IV                                                    Neuro Re-Ed             Postural correction and awareness training             Prone lying             SJ hips, hands against table 5x10 1x20  3x10 6x10 6x10 6 x 10          bridges 3x20 3x22 3x22 3x22 3 x 22                                  TB rows Blue  3x15 Blue  3x20 Blue  3x20 Blue  3x20 Blue 3 x 20        Slouch-overcorrect seated 3x10 3x15 3x15 3x15 ea 3 x 15 ea                                  Ther Ex             Standing hip flexor stretch 10\"x5 ea 10\"x5 ea 10\"x5 ea 10\"x5 ea 10\" x 5 ea                                                                                                   Ther Activity             STS with postural correction    1x10 1 x 10        Standing postural correction                          Gait Training             Gait training on floor with postural correction                          Modalities                                 "
normal...

## 2024-10-09 NOTE — PROGRESS NOTES
Assessment:  1. Lumbar spondylosis        Plan:  The patient has been experiencing moderate to severe axial spine pain that is causing functional deficit.  The pain has been present for at least 3 months and is not improving with conservative care.  Currently the patient is not experiencing any radicular features nor neurogenic claudication.  Non-facet pathology has been ruled out on clinical evaluation. We will schedule the patient for bilateral L3-5 medial branch blocks with intention of moving forward towards radiofrequency ablation if there is an appropriate diagnostic response. The initial blocks will be performed with 2% lidocaine and if an appropriate response is obtained upon review of the patient's pain diary, a confirmatory block will be scheduled.  Complete risks and benefits including bleeding, infection, tissue reaction, nerve injury and allergic reaction were discussed. The patient was agreeable and verbalized an understanding  Patient may continue with physical therapy as scheduled  Continue Tylenol and ibuprofen as needed  Follow-up pending results of blocks or sooner if needed.    History of Present Illness:    The patient is a 69 y.o. male with a history of diabetes last seen on 08/29/2024  who presents for a follow up office visit in regards to chronic axial low back pain.  He denies bowel or bladder incontinence or saddle anesthesia.  Patient has been participating in physical therapy with some improvement however he still finds that his pain significantly increases with any amount of distanceof ambulation or standing for any period of time.  He states he is hardly able to walk from his house to the mailbox without excruciating pain.  When he is sitting, his pain is improved.  He currently rates his pain a 2 out of 10 on the numeric pain rating scale however with ambulation his pain easily increases to a 7 out of 10 on the numeric pain rating scale.  He tries to avoid oral medications of  possible    I have personally reviewed and/or updated the patient's past medical history, past surgical history, family history, social history, current medications, allergies, and vital signs today.       Review of Systems:    Review of Systems      Past Medical History:   Diagnosis Date    Candidal intertrigo 10/16/2015    Diabetes mellitus (HCC)     Thyroid nodule 10/16/2015       History reviewed. No pertinent surgical history.    History reviewed. No pertinent family history.    Social History     Occupational History    Not on file   Tobacco Use    Smoking status: Never    Smokeless tobacco: Never   Vaping Use    Vaping status: Never Used   Substance and Sexual Activity    Alcohol use: Yes     Alcohol/week: 1.0 standard drink of alcohol     Types: 1 Cans of beer per week    Drug use: No    Sexual activity: Not on file         Current Outpatient Medications:     atorvastatin (LIPITOR) 40 mg tablet, TAKE 1 TABLET (40 MG TOTAL) BY MOUTH IN THE MORNING, Disp: 90 tablet, Rfl: 2    azelastine (OPTIVAR) 0.05 % ophthalmic solution, , Disp: , Rfl:     Blood Glucose Monitoring Suppl (FreeStyle Lite) BATSHEVA, Check fasting blood sugar every other day in the morning before breakfast., Disp: 1 each, Rfl: 0    clotrimazole-betamethasone (LOTRISONE) 1-0.05 % lotion, Apply topically 2 (two) times a day, Disp: 30 mL, Rfl: 0    glipiZIDE (GLUCOTROL XL) 5 mg 24 hr tablet, TAKE 1 TABLET (5 MG TOTAL) BY MOUTH IN THE MORNING, Disp: 90 tablet, Rfl: 2    glucose blood (OneTouch Verio) test strip, CHECK FASTING BLOOD SUGAR EVERY OTHER DAY IN THE MORNING BEFORE BREAKFAST., Disp: 25 strip, Rfl: 11    Jardiance 10 MG TABS tablet, TAKE 1 TABLET (10 MG TOTAL) BY MOUTH EVERY MORNING., Disp: 90 tablet, Rfl: 3    Lancets (freestyle) lancets, Check fasting blood sugar every other day in the morning before breakfast., Disp: 300 each, Rfl: 1    lisinopril (ZESTRIL) 5 mg tablet, TAKE 1 TABLET (5 MG TOTAL) BY MOUTH DAILY., Disp: 90 tablet, Rfl:  "0    Allergies   Allergen Reactions    Acetaminophen Diarrhea, Nausea Only, Dizziness and Lightheadedness     Liquid only        Physical Exam:    /77   Pulse 102   Ht 5' 9\" (1.753 m)   Wt 67.6 kg (149 lb)   BMI 22.00 kg/m²     Constitutional:normal, well developed, well nourished, alert, in no distress and non-toxic and no overt pain behavior.  Eyes:anicteric  HEENT:grossly intact  Neck:supple, symmetric, trachea midline and no masses   Pulmonary:even and unlabored  Cardiovascular:No edema or pitting edema present  Skin:Normal without rashes or lesions and well hydrated  Psychiatric:Mood and affect appropriate  Neurologic:Cranial Nerves II-XII grossly intact  Musculoskeletal:antalgic gait.  Bilateral lumbar facet loading maneuvers      Imaging  FL spine and pain procedure    (Results Pending)       Narrative & Impression  MRI LUMBAR SPINE WITHOUT CONTRAST     INDICATION: M48.062: Spinal stenosis, lumbar region with neurogenic claudication.     COMPARISON: 4/22/2024; 3/28/2024     TECHNIQUE:  Multiplanar, multisequence imaging of the lumbar spine was performed. .        IMAGE QUALITY:  Diagnostic     FINDINGS:     VERTEBRAL BODIES:  There are 5 lumbar type vertebral bodies. Moderate levoscoliosis mid lumbar spine centered at the L3 level. Trace grade 1 retrolisthesis of L3 on L4. Alignment is similar to the previous radiograph. Scattered degenerative endplate   changes.  No focally suspicious marrow lesions.  No bone marrow edema or compression abnormality.     SACRUM:  Normal signal within the sacrum. No evidence of insufficiency or stress fracture.     DISTAL CORD AND CONUS:  Normal size and signal within the distal cord and conus. The conus medullaris terminates at the L1-2 level.     PARASPINAL SOFT TISSUES: Mild asymmetric atrophy of the left psoas muscle.     LOWER THORACIC DISC SPACES:  Normal disc height and signal.  No disc herniation, canal stenosis or foraminal narrowing.     LUMBAR DISC " SPACES:     L1-L2: There is bilateral facet hypertrophy. There is a left subarticular zone/neural foraminal disc protrusion. Mild narrowing left subarticular zone and neural foramen. Central canal and right neural foramen patent.     L2-L3: There is loss of disc height. There is bilateral facet hypertrophy which is mild. There is a right neural foraminal disc protrusion. Mild right neural foraminal narrowing. Central canal patent. Mild narrowing left subarticular zone.     L3-L4: There is a central disc herniation, protrusion type extending eccentrically into the right subarticular zone/neural foramen. There is bilateral facet hypertrophy. Severe narrowing of the central canal. Moderate narrowing of the right subarticular   zone and right neural foramen. Mild left neural foraminal narrowing.     L4-L5: There is loss of disc height and signal. There is bilateral facet hypertrophy. There is a central/left paracentral disc protrusion extending into the left subarticular recess and neural foramen. Severe narrowing of the central canal, left   subarticular recess and left neural foramen. Mild narrowing of the right subarticular recess and right neural foramen.     L5-S1: There is bilateral facet hypertrophy. There is a left neural foraminal disc protrusion. Severe narrowing of the left neural foramen. Central canal mildly narrowed. Right neural foramen patent.     OTHER FINDINGS:  None.     IMPRESSION:     Advanced multilevel spondylosis most pronounced at L3-4, L4-5 and L5-S1.       Orders Placed This Encounter   Procedures    FL spine and pain procedure

## 2024-10-10 ENCOUNTER — OFFICE VISIT (OUTPATIENT)
Dept: PAIN MEDICINE | Facility: CLINIC | Age: 69
End: 2024-10-10
Payer: COMMERCIAL

## 2024-10-10 VITALS
WEIGHT: 149 LBS | HEART RATE: 102 BPM | DIASTOLIC BLOOD PRESSURE: 77 MMHG | BODY MASS INDEX: 22.07 KG/M2 | SYSTOLIC BLOOD PRESSURE: 133 MMHG | HEIGHT: 69 IN

## 2024-10-10 DIAGNOSIS — M47.816 LUMBAR SPONDYLOSIS: Primary | ICD-10-CM

## 2024-10-10 PROCEDURE — 99214 OFFICE O/P EST MOD 30 MIN: CPT | Performed by: NURSE PRACTITIONER

## 2024-10-10 PROCEDURE — G2211 COMPLEX E/M VISIT ADD ON: HCPCS | Performed by: NURSE PRACTITIONER

## 2024-10-14 ENCOUNTER — APPOINTMENT (OUTPATIENT)
Dept: PHYSICAL THERAPY | Facility: CLINIC | Age: 69
End: 2024-10-14
Payer: COMMERCIAL

## 2024-10-17 ENCOUNTER — OFFICE VISIT (OUTPATIENT)
Dept: PHYSICAL THERAPY | Facility: CLINIC | Age: 69
End: 2024-10-17
Payer: COMMERCIAL

## 2024-10-17 DIAGNOSIS — M54.50 CHRONIC BILATERAL LOW BACK PAIN WITHOUT SCIATICA: Primary | ICD-10-CM

## 2024-10-17 DIAGNOSIS — M48.062 NEUROGENIC CLAUDICATION DUE TO LUMBAR SPINAL STENOSIS: ICD-10-CM

## 2024-10-17 DIAGNOSIS — G89.29 CHRONIC BILATERAL LOW BACK PAIN WITHOUT SCIATICA: Primary | ICD-10-CM

## 2024-10-17 PROCEDURE — 97112 NEUROMUSCULAR REEDUCATION: CPT | Performed by: PHYSICAL THERAPIST

## 2024-10-17 NOTE — PROGRESS NOTES
"Daily Note     Today's date: 10/17/2024  Patient name: Jovani Stratton  : 1955  MRN: [id number]  Referring provider: Gui Brooks MD  Dx:   Encounter Diagnosis     ICD-10-CM    1. Chronic bilateral low back pain without sciatica  M54.50     G89.29       2. Neurogenic claudication due to lumbar spinal stenosis  M48.062                      Subjective: Pt reports mildly improved ambulation tolerance.    Objective: See treatment diary below    Assessment: Pt demonstrated mildly improved extension ROM following SJ 6x10 but still lacks sufficient extension ROM to maintain reductions in his derangement and pain.    Plan: Continue per plan of care.      Precautions: PMHx: DM II  Dx: chronic lumbar derangement extension responder, postural abnormalities    Manuals 9/23 9/26 9/30 10/2 10/7 10/17       Prone L5,S1 PA mobs Gr III-IV                                                    Neuro Re-Ed             Postural correction and awareness training             Prone lying             SJ hips, hands against table 5x10 1x20  3x10 6x10 6x10 6 x 10   6x10       bridges 3x20 3x22 3x22 3x22 3 x 22 3x22                                 TB rows Blue  3x15 Blue  3x20 Blue  3x20 Blue  3x20 Blue 3 x 20 Black  3x15       Slouch-overcorrect seated 3x10 3x15 3x15 3x15 ea 3 x 15 ea 3x15                                 Ther Ex             Standing hip flexor stretch 10\"x5 ea 10\"x5 ea 10\"x5 ea 10\"x5 ea 10\" x 5 ea 10\"x5 ea                                                                                                  Ther Activity             STS with postural correction    1x10 1 x 10 1x15       Standing postural correction                          Gait Training             Gait training on floor with postural correction                          Modalities                                              "

## 2024-10-21 ENCOUNTER — OFFICE VISIT (OUTPATIENT)
Dept: PHYSICAL THERAPY | Facility: CLINIC | Age: 69
End: 2024-10-21
Payer: COMMERCIAL

## 2024-10-21 DIAGNOSIS — G89.29 CHRONIC BILATERAL LOW BACK PAIN WITHOUT SCIATICA: Primary | ICD-10-CM

## 2024-10-21 DIAGNOSIS — M54.50 CHRONIC BILATERAL LOW BACK PAIN WITHOUT SCIATICA: Primary | ICD-10-CM

## 2024-10-21 DIAGNOSIS — M48.062 NEUROGENIC CLAUDICATION DUE TO LUMBAR SPINAL STENOSIS: ICD-10-CM

## 2024-10-21 PROCEDURE — 97112 NEUROMUSCULAR REEDUCATION: CPT

## 2024-10-21 NOTE — PROGRESS NOTES
"Daily Note     Today's date: 10/21/2024  Patient name: Jovani Stratton  : 1955  MRN: [id number]  Referring provider: Gui Brooks MD  Dx:   Encounter Diagnosis     ICD-10-CM    1. Chronic bilateral low back pain without sciatica  M54.50     G89.29       2. Neurogenic claudication due to lumbar spinal stenosis  M48.062                      Subjective: Pt reports 5-6/10 low back pain pre tx without radicular symptoms. Pt reports decreased episodes of LE pain.     Objective: See treatment diary below    Assessment: Pt continues to lack lumbar extension ROM with SJ. Pt was able to decreased intensity of low back pain with TE program but could not abolish lumbar pain.     Plan: Continue per plan of care.      Precautions: PMHx: DM II  Dx: chronic lumbar derangement extension responder, postural abnormalities    Manuals 9/23 9/26 9/30 10/2 10/7 10/17 10/21      Prone L5,S1 PA mobs Gr III-IV                                                    Neuro Re-Ed             Postural correction and awareness training             Prone lying             SJ hips, hands against table 5x10 1x20  3x10 6x10 6x10 6 x 10   6x10 6x10      bridges 3x20 3x22 3x22 3x22 3 x 22 3x22 3x22                                TB rows Blue  3x15 Blue  3x20 Blue  3x20 Blue  3x20 Blue 3 x 20 Black  3x15 Black  3x15      Slouch-overcorrect seated 3x10 3x15 3x15 3x15 ea 3 x 15 ea 3x15 3x15                                Ther Ex             Standing hip flexor stretch 10\"x5 ea 10\"x5 ea 10\"x5 ea 10\"x5 ea 10\" x 5 ea 10\"x5 ea 10\"x5 ea                                                                                                 Ther Activity             STS with postural correction    1x10 1 x 10 1x15 1x15      Standing postural correction                          Gait Training             Gait training on floor with postural correction                          Modalities                                              "

## 2024-10-22 DIAGNOSIS — E11.9 TYPE 2 DIABETES MELLITUS WITHOUT COMPLICATION, WITHOUT LONG-TERM CURRENT USE OF INSULIN (HCC): ICD-10-CM

## 2024-10-23 RX ORDER — GLIPIZIDE 5 MG/1
5 TABLET, FILM COATED, EXTENDED RELEASE ORAL DAILY
Qty: 90 TABLET | Refills: 2 | Status: SHIPPED | OUTPATIENT
Start: 2024-10-23

## 2024-10-24 ENCOUNTER — OFFICE VISIT (OUTPATIENT)
Dept: PHYSICAL THERAPY | Facility: CLINIC | Age: 69
End: 2024-10-24
Payer: COMMERCIAL

## 2024-10-24 DIAGNOSIS — G89.29 CHRONIC BILATERAL LOW BACK PAIN WITHOUT SCIATICA: Primary | ICD-10-CM

## 2024-10-24 DIAGNOSIS — M54.50 CHRONIC BILATERAL LOW BACK PAIN WITHOUT SCIATICA: Primary | ICD-10-CM

## 2024-10-24 DIAGNOSIS — M48.062 NEUROGENIC CLAUDICATION DUE TO LUMBAR SPINAL STENOSIS: ICD-10-CM

## 2024-10-24 PROCEDURE — 97112 NEUROMUSCULAR REEDUCATION: CPT | Performed by: PHYSICAL THERAPIST

## 2024-10-24 PROCEDURE — 97140 MANUAL THERAPY 1/> REGIONS: CPT | Performed by: PHYSICAL THERAPIST

## 2024-10-24 NOTE — PROGRESS NOTES
"Daily Note     Today's date: 10/24/2024  Patient name: Jovani Stratton  : 1955  MRN: [id number]  Referring provider: Gui Brooks MD  Dx:   Encounter Diagnosis     ICD-10-CM    1. Chronic bilateral low back pain without sciatica  M54.50     G89.29       2. Neurogenic claudication due to lumbar spinal stenosis  M48.062                      Subjective: Pt reports increased LBP without radicular symptoms x 1 wk. He reports 5/10 L>R LBP currently.    Objective: See treatment diary below    Assessment: Pt demonstrated D,B with PA mobs > lumbar SJ > thoracic REISit.    Plan: Cont POC.     Precautions: PMHx: DM II  Dx: chronic lumbar derangement extension responder, postural abnormalities    Manuals 9/23 9/26 9/30 10/2 10/7 10/17 10/21 10/24     Prone L5,S1 PA mobs Gr IV        1x50 ea                                            Neuro Re-Ed             Postural correction and awareness training             Prone lying             SJ hips, hands against table 5x10 1x20  3x10 6x10 6x10 6 x 10   6x10 6x10 8x10     Thoracic REISit        3x10     bridges 3x20 3x22 3x22 3x22 3 x 22 3x22 3x22 np                               TB rows Blue  3x15 Blue  3x20 Blue  3x20 Blue  3x20 Blue 3 x 20 Black  3x15 Black  3x15      Slouch-overcorrect seated 3x10 3x15 3x15 3x15 ea 3 x 15 ea 3x15 3x15                                Ther Ex             Standing hip flexor stretch 10\"x5 ea 10\"x5 ea 10\"x5 ea 10\"x5 ea 10\" x 5 ea 10\"x5 ea 10\"x5 ea 10\"x5 ea                                                                                                Ther Activity             STS with postural correction    1x10 1 x 10 1x15 1x15 nv     Standing postural correction                          Gait Training             Gait training on floor with postural correction                          Modalities                                              "

## 2024-10-28 ENCOUNTER — OFFICE VISIT (OUTPATIENT)
Dept: PHYSICAL THERAPY | Facility: CLINIC | Age: 69
End: 2024-10-28
Payer: COMMERCIAL

## 2024-10-28 DIAGNOSIS — M48.062 NEUROGENIC CLAUDICATION DUE TO LUMBAR SPINAL STENOSIS: ICD-10-CM

## 2024-10-28 DIAGNOSIS — G89.29 CHRONIC BILATERAL LOW BACK PAIN WITHOUT SCIATICA: Primary | ICD-10-CM

## 2024-10-28 DIAGNOSIS — M54.50 CHRONIC BILATERAL LOW BACK PAIN WITHOUT SCIATICA: Primary | ICD-10-CM

## 2024-10-28 PROCEDURE — 97112 NEUROMUSCULAR REEDUCATION: CPT

## 2024-10-28 NOTE — PROGRESS NOTES
"Daily Note     Today's date: 10/28/2024  Patient name: Jovani Stratton  : 1955  MRN: [id number]  Referring provider: Gui Brooks MD  Dx:   Encounter Diagnosis     ICD-10-CM    1. Chronic bilateral low back pain without sciatica  M54.50     G89.29       2. Neurogenic claudication due to lumbar spinal stenosis  M48.062           Start Time: 1400  Stop Time: 1440  Total time in clinic (min): 40 minutes    Subjective: Patient reports, \"3/10\" low back pain and no radicular symptoms prior to therapy.     Objective: See treatment diary below    Assessment: Patient able to decrease his low back pain to \"2/10\" following assigned TE.     Plan: Cont POC.     Precautions: PMHx: DM II  Dx: chronic lumbar derangement extension responder, postural abnormalities    Manuals 9/23 9/26 9/30 10/2 10/7 10/17 10/21 10/24 10/28    Prone L5,S1 PA mobs Gr IV        1x50 ea                                            Neuro Re-Ed             Postural correction and awareness training             Prone lying             SJ hips, hands against table 5x10 1x20  3x10 6x10 6x10 6 x 10   6x10 6x10 8x10 6x10    Thoracic REISit        3x10 3x10    bridges 3x20 3x22 3x22 3x22 3 x 22 3x22 3x22 np                               TB rows Blue  3x15 Blue  3x20 Blue  3x20 Blue  3x20 Blue 3 x 20 Black  3x15 Black  3x15      Slouch-overcorrect seated 3x10 3x15 3x15 3x15 ea 3 x 15 ea 3x15 3x15                                Ther Ex             Standing hip flexor stretch 10\"x5 ea 10\"x5 ea 10\"x5 ea 10\"x5 ea 10\" x 5 ea 10\"x5 ea 10\"x5 ea 10\"x5 ea 10\"x5 ea                                                                                               Ther Activity             STS with postural correction    1x10 1 x 10 1x15 1x15 nv     Standing postural correction                          Gait Training             Gait training on floor with postural correction                          Modalities                                          "

## 2024-10-30 ENCOUNTER — HOSPITAL ENCOUNTER (OUTPATIENT)
Dept: RADIOLOGY | Facility: CLINIC | Age: 69
Discharge: HOME | End: 2024-10-30
Payer: COMMERCIAL

## 2024-10-30 VITALS
TEMPERATURE: 97.8 F | OXYGEN SATURATION: 95 % | RESPIRATION RATE: 18 BRPM | HEART RATE: 95 BPM | SYSTOLIC BLOOD PRESSURE: 119 MMHG | DIASTOLIC BLOOD PRESSURE: 72 MMHG

## 2024-10-30 DIAGNOSIS — M47.816 LUMBAR SPONDYLOSIS: ICD-10-CM

## 2024-10-30 PROCEDURE — 64494 INJ PARAVERT F JNT L/S 2 LEV: CPT | Performed by: ANESTHESIOLOGY

## 2024-10-30 PROCEDURE — 64493 INJ PARAVERT F JNT L/S 1 LEV: CPT | Performed by: ANESTHESIOLOGY

## 2024-10-30 RX ORDER — LIDOCAINE HYDROCHLORIDE 10 MG/ML
4 INJECTION, SOLUTION EPIDURAL; INFILTRATION; INTRACAUDAL; PERINEURAL ONCE
Status: COMPLETED | OUTPATIENT
Start: 2024-10-30 | End: 2024-10-30

## 2024-10-30 RX ADMIN — LIDOCAINE HYDROCHLORIDE 4 ML: 10 INJECTION, SOLUTION EPIDURAL; INFILTRATION; INTRACAUDAL; PERINEURAL at 15:29

## 2024-10-30 RX ADMIN — LIDOCAINE HYDROCHLORIDE 3 ML: 20 INJECTION, SOLUTION EPIDURAL; INFILTRATION; INTRACAUDAL; PERINEURAL at 15:33

## 2024-10-30 NOTE — DISCHARGE INSTRUCTIONS

## 2024-10-30 NOTE — H&P
History of Present Illness: The patient is a 69 y.o. male who presents with complaints of low back pain.    Past Medical History:   Diagnosis Date    Candidal intertrigo 10/16/2015    Diabetes mellitus (HCC)     Thyroid nodule 10/16/2015       History reviewed. No pertinent surgical history.      Current Outpatient Medications:     atorvastatin (LIPITOR) 40 mg tablet, TAKE 1 TABLET (40 MG TOTAL) BY MOUTH IN THE MORNING, Disp: 90 tablet, Rfl: 2    azelastine (OPTIVAR) 0.05 % ophthalmic solution, , Disp: , Rfl:     Blood Glucose Monitoring Suppl (FreeStyle Lite) BATSHEVA, Check fasting blood sugar every other day in the morning before breakfast., Disp: 1 each, Rfl: 0    clotrimazole-betamethasone (LOTRISONE) 1-0.05 % lotion, Apply topically 2 (two) times a day, Disp: 30 mL, Rfl: 0    glipiZIDE (GLUCOTROL XL) 5 mg 24 hr tablet, TAKE 1 TABLET (5 MG TOTAL) BY MOUTH IN THE MORNING, Disp: 90 tablet, Rfl: 2    glucose blood (OneTouch Verio) test strip, CHECK FASTING BLOOD SUGAR EVERY OTHER DAY IN THE MORNING BEFORE BREAKFAST., Disp: 25 strip, Rfl: 11    Jardiance 10 MG TABS tablet, TAKE 1 TABLET (10 MG TOTAL) BY MOUTH EVERY MORNING., Disp: 90 tablet, Rfl: 3    Lancets (freestyle) lancets, Check fasting blood sugar every other day in the morning before breakfast., Disp: 300 each, Rfl: 1    lisinopril (ZESTRIL) 5 mg tablet, TAKE 1 TABLET (5 MG TOTAL) BY MOUTH DAILY., Disp: 90 tablet, Rfl: 0    Allergies   Allergen Reactions    Acetaminophen Diarrhea, Nausea Only, Dizziness and Lightheadedness     Liquid only        Physical Exam:   Vitals:    10/30/24 1507   BP: 137/75   Pulse: 65   Resp: 18   Temp: 97.8 °F (36.6 °C)   SpO2: 98%     General: Awake, Alert, Oriented x 3, Mood and affect appropriate  Respiratory: Respirations even and unlabored  Cardiovascular: Peripheral pulses intact; no edema  Musculoskeletal Exam: antalgic gait    ASA Score: 3    Patient/Chart Verification  Patient ID Verified: Verbal  ID Band Applied:  No  Consents Confirmed: Procedural  H&P( within 30 days) Verified: To be obtained in the Procedural area  Interval H&P(within 24 hr) Complete (required for Outpatients and Surgery Admit only): To be obtained in the Procedural area  Allergies Reviewed: Yes  Anticoag/NSAID held?: No  Currently on antibiotics?: No  Pre-op Lab/Test Results Available: In chart    Assessment:   1. Lumbar spondylosis        Plan: B/L L3-5 MBB

## 2024-10-31 ENCOUNTER — TELEPHONE (OUTPATIENT)
Age: 69
End: 2024-10-31

## 2024-10-31 ENCOUNTER — APPOINTMENT (OUTPATIENT)
Dept: PHYSICAL THERAPY | Facility: CLINIC | Age: 69
End: 2024-10-31
Payer: COMMERCIAL

## 2024-10-31 NOTE — TELEPHONE ENCOUNTER
AFTER VISIT SUMMARY             Delma Koenig           About your hospitalization     You were discharged on:  January 21, 2017 You last received care in the:  [facility]     Unit phone number:  [phone]       Medications    If you or your caregiver advised us that you are currently taking a medication and that medication is marked below as “Resume”, this simply indicates that we have reviewed those medications to make sure our new therapy recommendations do not interfere.  If you have concerns about medications other than those new ones which we are prescribing today, please consult the physician who prescribed them (or your primary physician).  Our review of your home medications is not meant to indicate that we are directing their use.             Your Medications      Your Medication List           Morning Noon Evening Bedtime As Needed    acetaminophen 325 MG tablet   Take 650 mg by mouth Every 6 (Six) Hours As Needed for mild pain (1-3) (for back pain).   Commonly known as:  TYLENOL                                ibuprofen 600 MG tablet   Take 1 tablet by mouth Every 6 (Six) Hours.   Commonly known as:  ADVIL,MOTRIN                                Prenatal 27-1 27-1 MG tablet tablet   Take  by mouth Daily.                                         Instructions for After Discharge         Follow-ups for After Discharge        Scheduled Appointments     Call MD on Monday 1/23/17 for appt. Need to be seen in 6 weeks or sooner if any problems or concerns           [facility] Signup Information     [facility] allows you to send messages to your doctor, view your test results, renew your prescriptions, schedule appointments, and more. To sign up, go to [employer] and click on the Sign Up Now link in the New User? box. Enter your [facility] Activation Code exactly as it appears below along with the last four digits of your Social Security Number and your Date of  Caller: Patient    Doctor: Dorcas    Reason for call: Patient indicates he faxed post procedure report. Was looking to see if you received it.    Faxed to: [phone]    Please advise.       Call back#: [phone]      Birth () to complete the sign-up process. If you do not sign up before the expiration date, you must request a new code.    [facility] Activation Code: [email or web address]  Expires: 2017 10:48 AM    If you have questions, you can email [email or web address] or call [phone] to talk to our [facility] staff. Remember, [facility] is NOT to be used for urgent needs. For medical emergencies, dial 911.           Summary of Your Hospitalization        Reason for Hospitalization     Your primary diagnosis was:  Not on File    Your diagnoses also included:  Normal Labor, Spontaneous Vaginal Delivery      Care Providers     Provider Service Role Specialty    [facility] Attending Provider Certified Nurse Midwife      Your Allergies  Date Reviewed: 2017    Allergen Reactions    Sulfa Antibiotics Not Noted    unknown      Pending Labs     Order Current Status    POC Albumin In process         SYMPTOMS OF A STROKE    Call 911 or have someone take you to the Emergency Department if you have any of the following:    · Sudden numbness or weakness of your face, arm or leg especially on one side of the body  · Sudden confusion, diffiiculty speaking or trouble understanding   · Changes in your vision or loss of sight in one eye  · Sudden severe headache with no known cause  · sudden dizziness, trouble walking, loss of balance or coordination    It is important to seek emergency care right away if you have further stroke symptoms. If you get emergency help quickly, the powerful clot-dissolving medicines can reduce the disabilities caused by a stroke.     For more information:    American Stroke Association  7-138-8-STROKE  www.strokeassociation.org           IF YOU SMOKE OR USE TOBACCO PLEASE READ THE FOLLOWING:    Why is smoking bad for me?  Smoking increases the risk of heart disease, lung disease, vascular disease, stroke, and cancer.     If you smoke, STOP!    If you would like more information on  quitting smoking, please visit the [employer] website: [email or web address]   This link will provide additional resources including the QUIT line and the Beat the Pack support groups.     For more information:    American Cancer Society  ([phone] 543-3807    American Heart Association  1-907.273.3801               YOU ARE THE MOST IMPORTANT FACTOR IN YOUR RECOVERY.     Follow all instructions carefully.     I have reviewed my discharge instructions with my nurse, including the following information, if applicable:     Information about my illness and diagnosis   Follow up appointments (including lab draws)   Wound Care   Equipment Needs   Medications (new and continuing) along with side effects   Preventative information such as vaccines and smoking cessations   Diet   Pain   I know when to contact my Doctor's office or seek emergency care      I want my nurse to describe the side effects of my medications: YES NO   If the answer is no, I understand the side effects of my medications: YES NO   My nurse described the side effects of my medications in a way that I could understand: YES NO   I have taken my personal belongings and my own medications with me at discharge: YES NO            Should a home visit be schedule with you:  a notification from your provider will be made prior to the visit.  If you have any questions or concerns about the visit, contact your provider.      I have received this information and my questions have been answered. I have discussed any concerns I see with this plan with the nurse or physician. I understand these instructions.    Signature of Patient or Responsible Person: _____________________________________    Date: _________________  Time: __________________    Signature of Healthcare Provider: _______________________________________  Date: _________________  Time: __________________

## 2024-11-04 NOTE — TELEPHONE ENCOUNTER
Caller: franca Hahn    Doctor: Dorcas    Reason for call: Pt called to see if we received the pain diary. I advised pt that we did receive the pain diary, and per Dr. Toscano procedure  will be calling the patient to schedule confirmatory block.     Provided pt with [employer] help desk number as well, pt was unable to login to [employer], to upload the pain diary.     Call back#: [phone]

## 2024-11-05 ENCOUNTER — TELEPHONE (OUTPATIENT)
Dept: RADIOLOGY | Facility: CLINIC | Age: 69
End: 2024-11-05

## 2024-11-05 DIAGNOSIS — M47.816 LUMBAR SPONDYLOSIS: Primary | ICD-10-CM

## 2024-11-05 NOTE — TELEPHONE ENCOUNTER
Caller: franca Chan    Doctor: Dorcas    Reason for call: pt returning the 's call.  Pt stated he is going out to vote and you can leave a detailed message with a date and time of the procedure    Call back#: [phone] please call pt's home #

## 2024-11-05 NOTE — TELEPHONE ENCOUNTER
Called patient back advise him home number was tried several times but it picks up and hangs up    Scheduled patient for procedure   Reviewed all instructions by phone upon scheduling  Mailed copy to home

## 2024-11-11 ENCOUNTER — OFFICE VISIT (OUTPATIENT)
Dept: PHYSICAL THERAPY | Facility: CLINIC | Age: 69
End: 2024-11-11
Payer: COMMERCIAL

## 2024-11-11 DIAGNOSIS — M48.062 NEUROGENIC CLAUDICATION DUE TO LUMBAR SPINAL STENOSIS: ICD-10-CM

## 2024-11-11 DIAGNOSIS — E11.29 TYPE 2 DIABETES MELLITUS WITH MICROALBUMINURIA, WITHOUT LONG-TERM CURRENT USE OF INSULIN (HCC): ICD-10-CM

## 2024-11-11 DIAGNOSIS — G89.29 CHRONIC BILATERAL LOW BACK PAIN WITHOUT SCIATICA: Primary | ICD-10-CM

## 2024-11-11 DIAGNOSIS — M54.50 CHRONIC BILATERAL LOW BACK PAIN WITHOUT SCIATICA: Primary | ICD-10-CM

## 2024-11-11 DIAGNOSIS — E78.5 HYPERLIPIDEMIA, UNSPECIFIED HYPERLIPIDEMIA TYPE: ICD-10-CM

## 2024-11-11 DIAGNOSIS — R80.9 TYPE 2 DIABETES MELLITUS WITH MICROALBUMINURIA, WITHOUT LONG-TERM CURRENT USE OF INSULIN (HCC): ICD-10-CM

## 2024-11-11 PROCEDURE — 97112 NEUROMUSCULAR REEDUCATION: CPT

## 2024-11-11 RX ORDER — ATORVASTATIN CALCIUM 40 MG/1
40 TABLET, FILM COATED ORAL DAILY
Qty: 90 TABLET | Refills: 2 | Status: SHIPPED | OUTPATIENT
Start: 2024-11-11

## 2024-11-11 RX ORDER — BLOOD SUGAR DIAGNOSTIC
STRIP MISCELLANEOUS
Qty: 25 STRIP | Refills: 11 | Status: SHIPPED | OUTPATIENT
Start: 2024-11-11

## 2024-11-11 RX ORDER — LISINOPRIL 5 MG/1
5 TABLET ORAL DAILY
Qty: 90 TABLET | Refills: 0 | Status: SHIPPED | OUTPATIENT
Start: 2024-11-11

## 2024-11-11 NOTE — PROGRESS NOTES
"Daily Note     Today's date: 2024  Patient name: Jovani Stratton  : 1955  MRN: [id number]  Referring provider: Gui Brooks MD  Dx:   Encounter Diagnosis     ICD-10-CM    1. Chronic bilateral low back pain without sciatica  M54.50     G89.29       2. Neurogenic claudication due to lumbar spinal stenosis  M48.062                      Subjective: Patient reports no low back pain currently and no radicular symptoms. Pt reports low back pain when walking without his cane. He reports poor compliance with HEP over the weekend and one episode of radicular symptoms with sitting in a chair. Pt reports no long term relief from medial branch block procedure.    Objective: See treatment diary below    Assessment: Patient demonstrated no significant changes in pain or radicular symptoms and poor HEP compliance.     Plan: Cont POC.     Precautions: PMHx: DM II  Dx: chronic lumbar derangement extension responder, postural abnormalities    Manuals 9/23 9/26 9/30 10/2 10/7 10/17 10/21 10/24 10/28 11/11   Prone L5,S1 PA mobs Gr IV        1x50 ea                                            Neuro Re-Ed             Postural correction and awareness training             Prone lying             SJ hips, hands against table 5x10 1x20  3x10 6x10 6x10 6 x 10   6x10 6x10 8x10 6x10 6x10   Thoracic REISit        3x10 3x10 3x10   bridges 3x20 3x22 3x22 3x22 3 x 22 3x22 3x22 np  3x22                             TB rows Blue  3x15 Blue  3x20 Blue  3x20 Blue  3x20 Blue 3 x 20 Black  3x15 Black  3x15      Slouch-overcorrect seated 3x10 3x15 3x15 3x15 ea 3 x 15 ea 3x15 3x15                                Ther Ex             Standing hip flexor stretch 10\"x5 ea 10\"x5 ea 10\"x5 ea 10\"x5 ea 10\" x 5 ea 10\"x5 ea 10\"x5 ea 10\"x5 ea 10\"x5 ea 10\"x5 ea                                                                                              Ther Activity             STS with postural correction    1x10 1 x 10 1x15 1x15 nv     Standing " postural correction                          Gait Training             Gait training on floor with postural correction                          Modalities

## 2024-11-15 ENCOUNTER — OFFICE VISIT (OUTPATIENT)
Dept: PHYSICAL THERAPY | Facility: CLINIC | Age: 69
End: 2024-11-15
Payer: COMMERCIAL

## 2024-11-15 DIAGNOSIS — M54.50 CHRONIC BILATERAL LOW BACK PAIN WITHOUT SCIATICA: Primary | ICD-10-CM

## 2024-11-15 DIAGNOSIS — M48.062 NEUROGENIC CLAUDICATION DUE TO LUMBAR SPINAL STENOSIS: ICD-10-CM

## 2024-11-15 DIAGNOSIS — G89.29 CHRONIC BILATERAL LOW BACK PAIN WITHOUT SCIATICA: Primary | ICD-10-CM

## 2024-11-15 PROCEDURE — 97112 NEUROMUSCULAR REEDUCATION: CPT

## 2024-11-15 PROCEDURE — 97110 THERAPEUTIC EXERCISES: CPT

## 2024-11-15 NOTE — PROGRESS NOTES
"Daily Note     Today's date: 11/15/2024  Patient name: Jovani Stratton  : 1955  MRN: [id number]  Referring provider: Gui Brooks MD  Dx:   Encounter Diagnosis     ICD-10-CM    1. Chronic bilateral low back pain without sciatica  M54.50     G89.29       2. Neurogenic claudication due to lumbar spinal stenosis  M48.062             Start Time: 1407  Stop Time: 1435  Total time in clinic (min): 28 minutes    Subjective: Patient reports he is having some pains today due to bending to wash his hair at the bathroom sink. He reports pain levels are at 3/10. He locates pain at his low back and posterior hip. Patient reports he is scheduled for a visit with pain management on . He reports he is going to get injections.     Objective: See treatment diary below    Assessment: Continued per plan of care with fairly good tolerance. Fatigue noted with bridging exercises. Patient will continue to benefit from skilled PT. Good status noted post session   PT 1:1 [id number]    Plan: Cont POC.     Precautions: PMHx: DM II  Dx: chronic lumbar derangement extension responder, postural abnormalities    Manuals 9/23 9/26 9/30 10/2 10/7 10/17 10/21 10/24 10/28 11/11 11/15   Prone L5,S1 PA mobs Gr IV        1x50 ea                                                Neuro Re-Ed              Postural correction and awareness training              Prone lying              SJ hips, hands against table 5x10 1x20  3x10 6x10 6x10 6 x 10   6x10 6x10 8x10 6x10 6x10 6x10    Thoracic REISit        3x10 3x10 3x10 3x10    bridges 3x20 3x22 3x22 3x22 3 x 22 3x22 3x22 np  3x22 3x22                               TB rows Blue  3x15 Blue  3x20 Blue  3x20 Blue  3x20 Blue 3 x 20 Black  3x15 Black  3x15       Slouch-overcorrect seated 3x10 3x15 3x15 3x15 ea 3 x 15 ea 3x15 3x15                                   Ther Ex              Standing hip flexor stretch 10\"x5 ea 10\"x5 ea 10\"x5 ea 10\"x5 ea 10\" x 5 ea 10\"x5 ea 10\"x5 ea 10\"x5 ea 10\"x5 ea 10\"x5 " "ea 10\" x5 ea                                                                                                     Ther Activity              STS with postural correction    1x10 1 x 10 1x15 1x15 nv      Standing postural correction                            Gait Training              Gait training on floor with postural correction                            Modalities                                             "

## 2024-11-18 ENCOUNTER — OFFICE VISIT (OUTPATIENT)
Dept: PHYSICAL THERAPY | Facility: CLINIC | Age: 69
End: 2024-11-18
Payer: COMMERCIAL

## 2024-11-18 DIAGNOSIS — G89.29 CHRONIC BILATERAL LOW BACK PAIN WITHOUT SCIATICA: Primary | ICD-10-CM

## 2024-11-18 DIAGNOSIS — M54.50 CHRONIC BILATERAL LOW BACK PAIN WITHOUT SCIATICA: Primary | ICD-10-CM

## 2024-11-18 DIAGNOSIS — M48.062 NEUROGENIC CLAUDICATION DUE TO LUMBAR SPINAL STENOSIS: ICD-10-CM

## 2024-11-18 PROCEDURE — 97112 NEUROMUSCULAR REEDUCATION: CPT | Performed by: PHYSICAL THERAPIST

## 2024-11-18 PROCEDURE — 97110 THERAPEUTIC EXERCISES: CPT | Performed by: PHYSICAL THERAPIST

## 2024-11-18 NOTE — PROGRESS NOTES
"Daily Note     Today's date: 2024  Patient name: Jovani Stratton  : 1955  MRN: [id number]  Referring provider: Gui Brooks MD  Dx:   Encounter Diagnosis     ICD-10-CM    1. Chronic bilateral low back pain without sciatica  M54.50     G89.29       2. Neurogenic claudication due to lumbar spinal stenosis  M48.062                        Subjective: Pt reports 4/10 L LBP radiating into the L post thigh with stairs and prolonged ambulation.    Objective: See treatment diary below    SJ P,NW (improved E ROM)  RFISit NE, NE    Assessment: Pt continues to demonstrate a positive response to extension but now with improved tolerance to flexion.    Plan: Cont POC through MBB #2 then d/c to HEP within the following 1-3 visits.     Precautions: PMHx: DM II  Dx: chronic lumbar derangement extension responder, postural abnormalities    Manuals 11/18         11/15   Prone L5,S1 PA mobs Gr IV                                                    Neuro Re-Ed             Postural correction and awareness training             Prone lying             SJ hips, hands against table 8x10         6x10    Thoracic REISit 4x10         3x10    bridges 6x10         3x22   RFISit 3\"/  1x10                         TB rows             Slouch-overcorrect seated                                       Ther Ex             Standing hip flexor stretch 10\"x5 ea         10\" x5 ea                                                                                              Ther Activity             STS with postural correction    1x10 1 x 10 1x15 1x15 nv     Standing postural correction                          Gait Training             Gait training on floor with postural correction                          Modalities                                          "

## 2024-11-21 ENCOUNTER — OFFICE VISIT (OUTPATIENT)
Dept: PHYSICAL THERAPY | Facility: CLINIC | Age: 69
End: 2024-11-21
Payer: COMMERCIAL

## 2024-11-21 DIAGNOSIS — M54.50 CHRONIC BILATERAL LOW BACK PAIN WITHOUT SCIATICA: Primary | ICD-10-CM

## 2024-11-21 DIAGNOSIS — M48.062 NEUROGENIC CLAUDICATION DUE TO LUMBAR SPINAL STENOSIS: ICD-10-CM

## 2024-11-21 DIAGNOSIS — G89.29 CHRONIC BILATERAL LOW BACK PAIN WITHOUT SCIATICA: Primary | ICD-10-CM

## 2024-11-21 PROCEDURE — 97112 NEUROMUSCULAR REEDUCATION: CPT

## 2024-11-21 NOTE — PROGRESS NOTES
"Daily Note     Today's date: 2024  Patient name: Jovani Stratton  : 1955  MRN: [id number]  Referring provider: Gui Brooks MD  Dx:   Encounter Diagnosis     ICD-10-CM    1. Chronic bilateral low back pain without sciatica  M54.50     G89.29       2. Neurogenic claudication due to lumbar spinal stenosis  M48.062                        Subjective: Pt reports decreased frequency of left leg pain     Objective: See treatment diary below    Assessment: Pt continued to demonstrate no increased symptoms with forward flexion. He continues to lack lumbar and trunk extension at normal resting posture.    Plan: Cont POC through MBB #2 then d/c to HEP within the following 1-3 visits.     Precautions: PMHx: DM II  Dx: chronic lumbar derangement extension responder, postural abnormalities    Manuals 11/18 11/21        11/15   Prone L5,S1 PA mobs Gr IV                                                    Neuro Re-Ed             Postural correction and awareness training             Prone lying             SJ hips, hands against table 8x10 6x10        6x10    Thoracic REISit 4x10 4x10        3x10    bridges 6x10 6x10        3x22   RFISit 3\"/  1x10 3\"/  1x10                        TB rows             Slouch-overcorrect seated                                       Ther Ex             Standing hip flexor stretch 10\"x5 ea 10\"x5 ea        10\" x5 ea                                                                                              Ther Activity             STS with postural correction    1x10 1 x 10 1x15 1x15 nv     Standing postural correction                          Gait Training             Gait training on floor with postural correction                          Modalities                                          "

## 2024-11-26 ENCOUNTER — HOSPITAL ENCOUNTER (OUTPATIENT)
Dept: RADIOLOGY | Facility: CLINIC | Age: 69
Discharge: HOME | End: 2024-11-26
Payer: COMMERCIAL

## 2024-11-26 VITALS
SYSTOLIC BLOOD PRESSURE: 110 MMHG | DIASTOLIC BLOOD PRESSURE: 70 MMHG | HEART RATE: 81 BPM | RESPIRATION RATE: 18 BRPM | OXYGEN SATURATION: 99 % | TEMPERATURE: 98.3 F

## 2024-11-26 DIAGNOSIS — M47.816 LUMBAR SPONDYLOSIS: ICD-10-CM

## 2024-11-26 PROCEDURE — 64493 INJ PARAVERT F JNT L/S 1 LEV: CPT | Performed by: ANESTHESIOLOGY

## 2024-11-26 PROCEDURE — 64494 INJ PARAVERT F JNT L/S 2 LEV: CPT | Performed by: ANESTHESIOLOGY

## 2024-11-26 RX ORDER — LIDOCAINE HYDROCHLORIDE 10 MG/ML
4 INJECTION, SOLUTION EPIDURAL; INFILTRATION; INTRACAUDAL; PERINEURAL ONCE
Status: COMPLETED | OUTPATIENT
Start: 2024-11-26 | End: 2024-11-26

## 2024-11-26 RX ORDER — BUPIVACAINE HYDROCHLORIDE 5 MG/ML
3 INJECTION, SOLUTION EPIDURAL; INTRACAUDAL ONCE
Status: COMPLETED | OUTPATIENT
Start: 2024-11-26 | End: 2024-11-26

## 2024-11-26 RX ADMIN — LIDOCAINE HYDROCHLORIDE 4 ML: 10 INJECTION, SOLUTION EPIDURAL; INFILTRATION; INTRACAUDAL; PERINEURAL at 14:04

## 2024-11-26 RX ADMIN — BUPIVACAINE HYDROCHLORIDE 3 ML: 5 INJECTION, SOLUTION EPIDURAL; INTRACAUDAL; PERINEURAL at 14:14

## 2024-11-26 NOTE — H&P
History of Present Illness: The patient is a 69 y.o. male who presents with complaints of low back pain.    Past Medical History:   Diagnosis Date    Candidal intertrigo 10/16/2015    Diabetes mellitus (HCC)     Thyroid nodule 10/16/2015       History reviewed. No pertinent surgical history.      Current Outpatient Medications:     atorvastatin (LIPITOR) 40 mg tablet, TAKE 1 TABLET (40 MG TOTAL) BY MOUTH IN THE MORNING, Disp: 90 tablet, Rfl: 2    azelastine (OPTIVAR) 0.05 % ophthalmic solution, , Disp: , Rfl:     Blood Glucose Monitoring Suppl (FreeStyle Lite) BATSHEVA, Check fasting blood sugar every other day in the morning before breakfast., Disp: 1 each, Rfl: 0    clotrimazole-betamethasone (LOTRISONE) 1-0.05 % lotion, Apply topically 2 (two) times a day, Disp: 30 mL, Rfl: 0    glipiZIDE (GLUCOTROL XL) 5 mg 24 hr tablet, TAKE 1 TABLET (5 MG TOTAL) BY MOUTH IN THE MORNING, Disp: 90 tablet, Rfl: 2    Jardiance 10 MG TABS tablet, TAKE 1 TABLET (10 MG TOTAL) BY MOUTH EVERY MORNING., Disp: 90 tablet, Rfl: 3    Lancets (freestyle) lancets, Check fasting blood sugar every other day in the morning before breakfast., Disp: 300 each, Rfl: 1    lisinopril (ZESTRIL) 5 mg tablet, TAKE 1 TABLET (5 MG TOTAL) BY MOUTH DAILY., Disp: 90 tablet, Rfl: 0    OneTouch Verio test strip, CHECK FASTING BLOOD SUGAR EVERY OTHER DAY IN THE MORNING BEFORE BREAKFAST., Disp: 25 strip, Rfl: 11    Allergies   Allergen Reactions    Acetaminophen Diarrhea, Nausea Only, Dizziness and Lightheadedness     Liquid only        Physical Exam:   Vitals:    11/26/24 1350   BP: 116/69   Pulse: 74   Resp: 16   Temp: 98.3 °F (36.8 °C)   SpO2: 98%     General: Awake, Alert, Oriented x 3, Mood and affect appropriate  Respiratory: Respirations even and unlabored  Cardiovascular: Peripheral pulses intact; no edema  Musculoskeletal Exam: normal gait    ASA Score: 2         Assessment:   1. Lumbar spondylosis        Plan: B/L L3-5 MBB#2

## 2024-11-26 NOTE — DISCHARGE INSTRUCTIONS

## 2024-11-27 ENCOUNTER — TELEPHONE (OUTPATIENT)
Age: 69
End: 2024-11-27

## 2024-11-27 NOTE — TELEPHONE ENCOUNTER
Caller: Patient     Doctor:      Reason for call: Patient faxed back pain diary to [phone].    Please call once received     Call back#: [phone]

## 2024-12-02 ENCOUNTER — OFFICE VISIT (OUTPATIENT)
Dept: PHYSICAL THERAPY | Facility: CLINIC | Age: 69
End: 2024-12-02
Payer: COMMERCIAL

## 2024-12-02 DIAGNOSIS — M54.50 CHRONIC BILATERAL LOW BACK PAIN WITHOUT SCIATICA: Primary | ICD-10-CM

## 2024-12-02 DIAGNOSIS — M48.062 NEUROGENIC CLAUDICATION DUE TO LUMBAR SPINAL STENOSIS: ICD-10-CM

## 2024-12-02 DIAGNOSIS — G89.29 CHRONIC BILATERAL LOW BACK PAIN WITHOUT SCIATICA: Primary | ICD-10-CM

## 2024-12-02 PROCEDURE — 97112 NEUROMUSCULAR REEDUCATION: CPT

## 2024-12-02 NOTE — TELEPHONE ENCOUNTER
Pain diary has been received- Dr. Toscano is out of the office-Will call patient upon Dr Toscano's return and review

## 2024-12-02 NOTE — TELEPHONE ENCOUNTER
Called patient back to make him aware we will contact him on Wed with next steps.   Also made him aware out office is unable to contact him on his home phone yet. He was made aware the last time we tried to call him. He thought maybe our number was blocked and he corrected it but we are still unable to call home number.

## 2024-12-02 NOTE — PROGRESS NOTES
"Daily Note     Today's date: 2024  Patient name: Jovani Stratton  : 1955  MRN: [id number]  Referring provider: Gui Brooks MD  Dx:   Encounter Diagnosis     ICD-10-CM    1. Chronic bilateral low back pain without sciatica  M54.50     G89.29       2. Neurogenic claudication due to lumbar spinal stenosis  M48.062                        Subjective: Pt reports increased low back pain due to poor HEP compliance and continued leg symptoms with sitting on his computer. Pt reports 2/10 low back pain currently.     Objective: See treatment diary below    Assessment: Pt demonstrates poor HEP compliance and no change in symptoms.     Plan: D/c to HEP within the following 1-3 visits.     Precautions: PMHx: DM II  Dx: chronic lumbar derangement extension responder, postural abnormalities    Manuals 11/18 11/21 12/2       11/15   Prone L5,S1 PA mobs Gr IV                                                    Neuro Re-Ed             Postural correction and awareness training             Prone lying             SJ hips, hands against table 8x10 6x10 6x10       6x10    Thoracic REISit 4x10 4x10 4x10       3x10    bridges 6x10 6x10 6x10       3x22   RFISit 3\"/  1x10 3\"/  1x10 3\"/ 1x10                       TB rows             Slouch-overcorrect seated                                       Ther Ex             Standing hip flexor stretch 10\"x5 ea 10\"x5 ea 10\"x5 ea       10\" x5 ea                                                                                              Ther Activity             STS with postural correction    1x10 1 x 10 1x15 1x15 nv     Standing postural correction                          Gait Training             Gait training on floor with postural correction                          Modalities                                          "

## 2024-12-05 ENCOUNTER — OFFICE VISIT (OUTPATIENT)
Dept: PHYSICAL THERAPY | Facility: CLINIC | Age: 69
End: 2024-12-05
Payer: COMMERCIAL

## 2024-12-05 DIAGNOSIS — M54.50 CHRONIC BILATERAL LOW BACK PAIN WITHOUT SCIATICA: Primary | ICD-10-CM

## 2024-12-05 DIAGNOSIS — M48.062 NEUROGENIC CLAUDICATION DUE TO LUMBAR SPINAL STENOSIS: ICD-10-CM

## 2024-12-05 DIAGNOSIS — G89.29 CHRONIC BILATERAL LOW BACK PAIN WITHOUT SCIATICA: Primary | ICD-10-CM

## 2024-12-05 PROCEDURE — 97112 NEUROMUSCULAR REEDUCATION: CPT | Performed by: PHYSICAL THERAPIST

## 2024-12-05 NOTE — PROGRESS NOTES
"Daily Note     Today's date: 2024  Patient name: Jovani Stratton  : 1955  MRN: [id number]  Referring provider: Gui Brooks MD  Dx:   Encounter Diagnosis     ICD-10-CM    1. Chronic bilateral low back pain without sciatica  M54.50     G89.29       2. Neurogenic claudication due to lumbar spinal stenosis  M48.062                        Subjective: Pt reports increased LBP over the past couple days. He currently reports 3/10 L LBP radiating into the glute, will occasionally radiate to mid-thigh. He reports [id number] compliance with HEP or avoidance of prolonged sitting during the day.     Objective: See treatment diary below    Assessment: Pt demonstrates poor lumbar ROM and self-management.    Plan: D/c to HEP in 2 visits.     Precautions: PMHx: DM II  Dx: chronic lumbar derangement extension responder, postural abnormalities    Manuals          Prone L5,S1 PA mobs Gr IV                                                    Neuro Re-Ed             Postural correction and awareness training             Prone lying             SJ hips, hands against table 8x10 6x10 6x10 6x10         Thoracic REISit 4x10 4x10 4x10 5x10         bridges 6x10 6x10 6x10 6x10         RFISit 3\"/  1x10 3\"/  1x10 3\"/ 1x10 D/c                      TB rows    Black  3x15         Slouch-overcorrect seated                                       Ther Ex             Standing hip flexor stretch 10\"x5 ea 10\"x5 ea 10\"x5 ea 10\"x6 ea                                                                                                    Ther Activity             STS with postural correction             Standing postural correction                          Gait Training             Gait training on floor with postural correction                          Modalities                                          "

## 2024-12-09 ENCOUNTER — OFFICE VISIT (OUTPATIENT)
Dept: PHYSICAL THERAPY | Facility: CLINIC | Age: 69
End: 2024-12-09
Payer: COMMERCIAL

## 2024-12-09 DIAGNOSIS — M48.062 NEUROGENIC CLAUDICATION DUE TO LUMBAR SPINAL STENOSIS: ICD-10-CM

## 2024-12-09 DIAGNOSIS — M54.50 CHRONIC BILATERAL LOW BACK PAIN WITHOUT SCIATICA: Primary | ICD-10-CM

## 2024-12-09 DIAGNOSIS — G89.29 CHRONIC BILATERAL LOW BACK PAIN WITHOUT SCIATICA: Primary | ICD-10-CM

## 2024-12-09 PROCEDURE — 97112 NEUROMUSCULAR REEDUCATION: CPT | Performed by: PHYSICAL THERAPIST

## 2024-12-09 NOTE — PROGRESS NOTES
"Daily Note     Today's date: 2024  Patient name: Jovani Stratton  : 1955  MRN: [id number]  Referring provider: Gui Brooks MD  Dx:   Encounter Diagnosis     ICD-10-CM    1. Chronic bilateral low back pain without sciatica  M54.50     G89.29       2. Neurogenic claudication due to lumbar spinal stenosis  M48.062                        Subjective: Pt reports increased LBP today after having to jump his car prior to his appt.     Objective: See treatment diary below    Assessment: Pt demonstrates poor lumbar ROM, postural correction, and self-management.    Plan: D/c to HEP in 1 visits.     Precautions: PMHx: DM II  Dx: chronic lumbar derangement extension responder, postural abnormalities    Manuals         Prone L5,S1 PA mobs Gr IV                                                    Neuro Re-Ed             Postural correction and awareness training             Prone lying             SJ hips, hands against table 8x10 6x10 6x10 6x10 5x10        Thoracic REISit 4x10 4x10 4x10 5x10 5x10        bridges 6x10 6x10 6x10 6x10 6x10        RFISit 3\"/  1x10 3\"/  1x10 3\"/ 1x10 D/c                      TB rows    Black  3x15 Black  3x15        Slouch-overcorrect seated                                       Ther Ex             Standing hip flexor stretch 10\"x5 ea 10\"x5 ea 10\"x5 ea 10\"x6 ea 10\"x5 ea                                                                                                   Ther Activity             STS with postural correction             Standing postural correction                          Gait Training             Gait training on floor with postural correction                          Modalities                                          "

## 2024-12-11 ENCOUNTER — OFFICE VISIT (OUTPATIENT)
Dept: OBGYN CLINIC | Facility: HOSPITAL | Age: 69
End: 2024-12-11
Payer: COMMERCIAL

## 2024-12-11 VITALS — WEIGHT: 149.03 LBS | HEIGHT: 69 IN | BODY MASS INDEX: 22.07 KG/M2

## 2024-12-11 DIAGNOSIS — G89.29 CHRONIC BILATERAL LOW BACK PAIN WITHOUT SCIATICA: Primary | ICD-10-CM

## 2024-12-11 DIAGNOSIS — M54.50 CHRONIC BILATERAL LOW BACK PAIN WITHOUT SCIATICA: Primary | ICD-10-CM

## 2024-12-11 PROCEDURE — 99213 OFFICE O/P EST LOW 20 MIN: CPT | Performed by: ORTHOPAEDIC SURGERY

## 2024-12-11 NOTE — PROGRESS NOTES
Assessment & Plan/Medical Decision Makin y.o. male with Back Pain, Ambulatory Dysfunction, and Neurogenic Claudication and imaging findings most notable for lumbar spondylosis  and L3-4, L4-5 disc degeneration          The clinical, physical and imaging findings were reviewed with the patient.  Jovani has a constellation of findings consistent with Lumbar Myofascial Pain, Ambulatory Dysfunction, and Neurogenic Claudication in the setting of lumbar degenerative disease.      Fortunately patient remains functional. Physical exam showing limitation in extension and lower extremity weakness. We discussed the treatment options including physical therapy, at home exercises, activity modifications, chiropractic medicine, oral medications, interventional spine procedures.      Patient reports improvement with recent lumbar MBB x2. Would recommend patient proceed with lumbar RFA. Continue to follow up with spine & pain management. Patient did not require a new referral at today's visit.  Continue with physical therapy if providing benefit. Continue to maintain at home exercises and stretches.    Patient instructed to return to office/ER sooner if symptoms are not improving, getting worse, or new worrisome/neurologic symptoms arise.  Patient will follow up as needed if symptoms persist.      Subjective:      Chief Complaint: Back Pain    HPI:  Jovani Stratton is a 69 y.o. male presenting for initial visit with chief complaint of lower back pain for several years. Previously had a history of herniated discs in the , but his pain and radicular symptoms improved following PT at that time. He does not have radicular symptoms at this time.  Describes pain as dull and achy in nature.  Located in low back.  Denies numbness . Denies burning.  Back pain 100%, Leg pain 0%.  L > R. Pain is worse with prolonged standing and walking and improves with rest. Denies any hussain trauma. Denies fever or chills, no night sweats.  Denies any bladder or bowel changes.      Conservative therapy includes the following:   Medications: Motrin    Injections: none  Physical Therapy: in the past, nothing recent  Chiropractic Medicine: has not attempted  Accupunture/Massage Therapy: has not attempted   These therapeutic modalities were ineffective at providing sustained pain relief/functional improvement.     Nicotine dependent: No  Occupation: Retired  Living situation: Lives with family   ADLs: patient is able to perform     Update 5/28/24  Presents for follow up to discuss MRI results. He reports since previous appointment, his back pain is slightly worse. He denies leg pain, denies numbness or paresthesias. 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 He occasionally takes ibuprofen, but it does not relieve his symptoms.     Update 07/30/2024  69 y.o. male presents to the office for follow up of low back pain. Since last visit he has been taking gabapentin and started physical therapy. He feels that physical therapy is helping him stand more erect and walk slightly longer but still has pain. He also notes that he has had some side effects from the gabapentin. He continues to have difficulty with forward bending and doing his yard work. He also struggles with his balance and notes a fall a few weeks ago.    Update 9/11/2024  Patient is here for follow-up.  He was evaluated by pain management and recommended for medial branch blocks.  He is considering this option.  He continues to use a cane and has low back pain.    Update on 12/11/2024  Jovani is here for follow up. He has been going to physical therapy with some temporary relief. He also recently underwent bilateral L4-5, L5-S1 MBB on 10/30/2024 and 11/26/2024. The injections provided 1 day of relief. Reports continued back pain and decreased standing and walking endurance. Notes he  needs to stop and sit frequently due to back/gluteal pain. Notes his left low back, gluteal region and hip feels weak when walking. Using cane for ambulation.Also with left gluteal and hip pain with prolonged sitting in certain chairs. Pain will radiate into posterior aspect of left thigh when sitting for extended periods of time.    PMH T2DM (Hgb A1c 6.7 on 3/26/2024)    Objective:     History reviewed. No pertinent family history.    Past Medical History:   Diagnosis Date    Candidal intertrigo 10/16/2015    Diabetes mellitus (HCC)     Thyroid nodule 10/16/2015       Current Outpatient Medications   Medication Sig Dispense Refill    atorvastatin (LIPITOR) 40 mg tablet TAKE 1 TABLET (40 MG TOTAL) BY MOUTH IN THE MORNING 90 tablet 2    azelastine (OPTIVAR) 0.05 % ophthalmic solution  (Patient not taking: Reported on 8/29/2024)      Blood Glucose Monitoring Suppl (FreeStyle Lite) BATSHEVA Check fasting blood sugar every other day in the morning before breakfast. 1 each 0    clotrimazole-betamethasone (LOTRISONE) 1-0.05 % lotion Apply topically 2 (two) times a day 30 mL 0    glipiZIDE (GLUCOTROL XL) 5 mg 24 hr tablet TAKE 1 TABLET (5 MG TOTAL) BY MOUTH IN THE MORNING 90 tablet 2    Jardiance 10 MG TABS tablet TAKE 1 TABLET (10 MG TOTAL) BY MOUTH EVERY MORNING. 90 tablet 3    Lancets (freestyle) lancets Check fasting blood sugar every other day in the morning before breakfast. 300 each 1    lisinopril (ZESTRIL) 5 mg tablet TAKE 1 TABLET (5 MG TOTAL) BY MOUTH DAILY. 90 tablet 0    OneTouch Verio test strip CHECK FASTING BLOOD SUGAR EVERY OTHER DAY IN THE MORNING BEFORE BREAKFAST. 25 strip 11     No current facility-administered medications for this visit.       History reviewed. No pertinent surgical history.    Social History     Socioeconomic History    Marital status: Single     Spouse name: Not on file    Number of children: Not on file    Years of education: Not on file    Highest education level: Not on file    Occupational History    Not on file   Tobacco Use    Smoking status: Never    Smokeless tobacco: Never   Vaping Use    Vaping status: Never Used   Substance and Sexual Activity    Alcohol use: Yes     Alcohol/week: 1.0 standard drink of alcohol     Types: 1 Cans of beer per week    Drug use: No    Sexual activity: Not on file   Other Topics Concern    Not on file   Social History Narrative    Not on file     Social Drivers of Health     Financial Resource Strain: Medium Risk (12/24/2023)    Overall Financial Resource Strain (CARDIA)     Difficulty of Paying Living Expenses: Somewhat hard   Food Insecurity: Food Insecurity Present (12/24/2023)    Nursing - Inadequate Food Risk Classification     Worried About Running Out of Food in the Last Year: Sometimes true     Ran Out of Food in the Last Year: Never true     Ran Out of Food in the Last Year: Not on file   Transportation Needs: No Transportation Needs (12/24/2023)    PRAPARE - Transportation     Lack of Transportation (Medical): No     Lack of Transportation (Non-Medical): No   Physical Activity: Not on file   Stress: No Stress Concern Present (12/26/2023)    [employer] [place] [employer] - Occupational Stress Questionnaire     Feeling of Stress : Not at all   Social Connections: Unknown (6/18/2024)    Received from [employer]     How often do you feel lonely or isolated from those around you? (Adult - for ages 18 years and over): Not on file   Intimate Partner Violence: Not At Risk (12/26/2023)    Humiliation, Afraid, Rape, and Kick questionnaire     Fear of Current or Ex-Partner: No     Emotionally Abused: No     Physically Abused: No     Sexually Abused: No   Housing Stability: Low Risk  (12/26/2023)    Housing Stability Vital Sign     Unable to Pay for Housing in the Last Year: No     Number of Times Moved in the Last Year: 1     Homeless in the Last Year: No       Allergies   Allergen Reactions    Acetaminophen Diarrhea,  "Nausea Only, Dizziness and Lightheadedness     Liquid only        Review of Systems  General- denies fever/chills  HEENT- denies hearing loss or sore throat  Eyes- denies eye pain or visual disturbances, denies red eyes  Respiratory- denies cough or SOB  Cardio- denies chest pain or palpitations  GI- denies abdominal pain  Endocrine- denies urinary frequency  Urinary- denies pain with urination  Musculoskeletal- Negative except noted above  Skin- denies rashes or wounds  Neurological- denies dizziness or headache  Psychiatric- denies anxiety or difficulty concentrating    Physical Exam  Ht 5' 9\" (1.753 m)   Wt 67.6 kg (149 lb 0.5 oz)   BMI 22.01 kg/m²     General/Constitutional: No apparent distress: well-nourished and well developed.  Lymphatic: No appreciable lymphadenopathy  Respiratory: Non-labored breathing  Vascular: No edema, swelling or tenderness, except as noted in detailed exam.  Integumentary: No impressive skin lesions present, except as noted in detailed exam.  Psych: Normal mood and affect, oriented to person, place and time.  MSK: normal other than stated in HPI and exam  Gait & balance: no evidence of myelopathic gait, ambulates with a Cane    Lumbar spine range of motion:  -Forward flexion to 90  -Extension to neutral  -Lateral bend 25 right, 25 left  -Rotation 25 right, 25 left  There tenderness with palpation along lumbar paraspinal musculature, no midline tenderness     Neurologic:    Lower Extremity Motor Function    Right  Left    Iliopsoas  5/5  5/5    Quadriceps 4/5 4/5   Tibialis anterior  4/5  4/5    EHL  4+/5  4+/5    Gastroc. muscle  5/5  5/5    Heel rise  4/5  4/5    Toe rise  4/5  4/5      Sensory: light touch is intact to bilateral upper and lower extremities     Reflexes:    Right Left   Patellar 0 1+   Achilles 0 0   Babinski neg neg     Other tests:  Straight Leg Raise: negative  Fannie SI: negative  NATALY SI: negative  Greater troch: no tenderness   Internal/external hip ROM: " "intact, no pain   Flexion/extension knee ROM: intact, no pain   Vascular: WWP extremities, 2+DP bilateral      Diagnostic Tests   IMAGING: I have personally reviewed the images and these are my findings:  Lumbar Spine X-rays from 3/28 and 4/22/24: multi level lumbar spondylosis with loss of disc height, osteophyte formation and facet hypertrophy, no apparent spondylolisthesis, no appreciated lytic/blastic lesions, no obvious instability    CT Renal Stone Study from 11/28/2019: calcified discs with lumbar spondylosis, loss of disc height, and severe central stenosis at L3-4 and L4-5.    Lumbar MRI from 5/19/24: multi level lumbar spondylosis with loss of disc height, osteophyte formation and facet hypertrophy, spinal stenosis at L3-4 and L4-5, no apparent spondylolisthesis, no appreciated lytic or blastic lesions, no obvious instability     Electronic Medical Records were reviewed including imaging studies and PMHx    Procedures, if performed today     None performed       Portions of the record may have been created with voice recognition software.  Occasional wrong word or \"sound a like\" substitutions may have occurred due to the inherent limitations of voice recognition software.  Read the chart carefully and recognize, using context, where substitutions have occurred.  " None

## 2024-12-12 ENCOUNTER — OFFICE VISIT (OUTPATIENT)
Dept: PHYSICAL THERAPY | Facility: CLINIC | Age: 69
End: 2024-12-12
Payer: COMMERCIAL

## 2024-12-12 DIAGNOSIS — M48.062 NEUROGENIC CLAUDICATION DUE TO LUMBAR SPINAL STENOSIS: ICD-10-CM

## 2024-12-12 DIAGNOSIS — M54.50 CHRONIC BILATERAL LOW BACK PAIN WITHOUT SCIATICA: Primary | ICD-10-CM

## 2024-12-12 DIAGNOSIS — G89.29 CHRONIC BILATERAL LOW BACK PAIN WITHOUT SCIATICA: Primary | ICD-10-CM

## 2024-12-12 PROCEDURE — 97112 NEUROMUSCULAR REEDUCATION: CPT | Performed by: PHYSICAL THERAPIST

## 2024-12-12 NOTE — PROGRESS NOTES
"Daily Note     Today's date: 2024  Patient name: Jovani Stratton  : 1955  MRN: [id number]  Referring provider: Gui Brooks MD  Dx:   Encounter Diagnosis     ICD-10-CM    1. Chronic bilateral low back pain without sciatica  M54.50     G89.29       2. Neurogenic claudication due to lumbar spinal stenosis  M48.062                        Subjective: Pt reports 2.5/10 L LBP currently.     Objective: See treatment diary below    Assessment: Pt is appropriate for d/c from skilled PT services to a maintenance HEP at this time.    Plan: D/c to HEP.     Precautions: PMHx: DM II  Dx: chronic lumbar derangement extension responder, postural abnormalities    Manuals        Prone L5,S1 PA mobs Gr IV                                                    Neuro Re-Ed             Postural correction and awareness training             Prone lying             SJ hips, hands against table 8x10 6x10 6x10 6x10 5x10 5x10       Thoracic REISit 4x10 4x10 4x10 5x10 5x10 5x10       bridges 6x10 6x10 6x10 6x10 6x10 6x10       RFISit 3\"/  1x10 3\"/  1x10 3\"/ 1x10 D/c                      TB rows    Black  3x15 Black  3x15 Black  3x15       Slouch-overcorrect seated                                       Ther Ex             Standing hip flexor stretch 10\"x5 ea 10\"x5 ea 10\"x5 ea 10\"x6 ea 10\"x5 ea 10\"x5 ea                                                                                                  Ther Activity             STS with postural correction             Standing postural correction                          Gait Training             Gait training on floor with postural correction                          Modalities                                          "

## 2024-12-19 ENCOUNTER — TELEPHONE (OUTPATIENT)
Dept: PAIN MEDICINE | Facility: CLINIC | Age: 69
End: 2024-12-19

## 2025-01-02 ENCOUNTER — OFFICE VISIT (OUTPATIENT)
Dept: FAMILY MEDICINE CLINIC | Facility: CLINIC | Age: 70
End: 2025-01-02

## 2025-01-02 VITALS
HEIGHT: 66 IN | SYSTOLIC BLOOD PRESSURE: 126 MMHG | BODY MASS INDEX: 24.59 KG/M2 | HEART RATE: 91 BPM | TEMPERATURE: 97.9 F | WEIGHT: 153 LBS | DIASTOLIC BLOOD PRESSURE: 74 MMHG | OXYGEN SATURATION: 100 %

## 2025-01-02 DIAGNOSIS — G89.29 CHRONIC BILATERAL LOW BACK PAIN WITHOUT SCIATICA: ICD-10-CM

## 2025-01-02 DIAGNOSIS — M54.50 CHRONIC BILATERAL LOW BACK PAIN WITHOUT SCIATICA: ICD-10-CM

## 2025-01-02 DIAGNOSIS — R80.9 TYPE 2 DIABETES MELLITUS WITH MICROALBUMINURIA, WITHOUT LONG-TERM CURRENT USE OF INSULIN (HCC): Primary | ICD-10-CM

## 2025-01-02 DIAGNOSIS — E11.29 TYPE 2 DIABETES MELLITUS WITH MICROALBUMINURIA, WITHOUT LONG-TERM CURRENT USE OF INSULIN (HCC): Primary | ICD-10-CM

## 2025-01-02 LAB — SL AMB POCT HEMOGLOBIN AIC: 6.8 (ref ?–6.5)

## 2025-01-02 PROCEDURE — 99214 OFFICE O/P EST MOD 30 MIN: CPT

## 2025-01-02 PROCEDURE — 83036 HEMOGLOBIN GLYCOSYLATED A1C: CPT

## 2025-01-02 NOTE — PROGRESS NOTES
"Name: Jovani Stratton      : 1955      MRN: [id number]  Encounter Provider: Joaquín Delacruz DO  Encounter Date: 2025   Encounter department: [facility]  :  Assessment & Plan  Type 2 diabetes mellitus with microalbuminuria, without long-term current use of insulin ([facility])    Lab Results   Component Value Date    HGBA1C 6.8 (A) 2025   Relatively controlled on Glipizide 5 mg QD  Will complete eye exam at next visit 6 weeks from now   Continue with current regimen and be mindful with sweet consumption    Orders:    POCT hemoglobin A1c    Chronic bilateral low back pain without sciatica  Been seeing PT with minimal relief   Recent nerve block test with spine center; will come back to see them in February for further consultation                   History of Present Illness     HPI  69 y.o m presented for follow up on his chronic back pain and diabetes. A1C stable today, no change in management.  Review of his recent PT as well as spine center documentation.  Patient did recently complete a nerve block test, he will come back to see spine center Dr. Sanabria on February for further consultation.   Review of Systems   Constitutional:  Negative for chills and fever.   HENT:  Negative for ear pain and sore throat.    Eyes:  Negative for pain and visual disturbance.   Respiratory:  Negative for cough and shortness of breath.    Cardiovascular:  Negative for chest pain and palpitations.   Gastrointestinal:  Negative for abdominal pain and vomiting.   Genitourinary:  Negative for dysuria and hematuria.   Musculoskeletal:  Positive for arthralgias, back pain and gait problem.   Skin:  Negative for color change and rash.   Neurological:  Negative for seizures and syncope.   All other systems reviewed and are negative.      Objective   /74 (BP Location: Left arm, Patient Position: Sitting, Cuff Size: Standard)   Pulse 91   Temp 97.9 °F (36.6 °C) (Temporal)   Ht 5' 6.3\" (1.684 " m)   Wt 69.4 kg (153 lb)   SpO2 100%   BMI 24.47 kg/m²      Physical Exam  Vitals and nursing note reviewed.   Constitutional:       General: He is not in acute distress.     Appearance: He is well-developed.   HENT:      Head: Normocephalic and atraumatic.   Eyes:      Conjunctiva/sclera: Conjunctivae normal.   Cardiovascular:      Rate and Rhythm: Normal rate and regular rhythm.      Heart sounds: No murmur heard.  Pulmonary:      Effort: Pulmonary effort is normal. No respiratory distress.      Breath sounds: Normal breath sounds.   Abdominal:      Palpations: Abdomen is soft.      Tenderness: There is no abdominal tenderness.   Musculoskeletal:         General: No swelling.      Cervical back: Neck supple.   Skin:     General: Skin is warm and dry.      Capillary Refill: Capillary refill takes less than 2 seconds.   Neurological:      Mental Status: He is alert.   Psychiatric:         Mood and Affect: Mood normal.

## 2025-01-02 NOTE — ASSESSMENT & PLAN NOTE
Been seeing PT with minimal relief   Recent nerve block test with spine center; will come back to see them in February for further consultation

## 2025-01-02 NOTE — ASSESSMENT & PLAN NOTE
Lab Results   Component Value Date    HGBA1C 6.8 (A) 01/02/2025   Relatively controlled on Glipizide 5 mg QD  Will complete eye exam at next visit 6 weeks from now   Continue with current regimen and be mindful with sweet consumption    Orders:    POCT hemoglobin A1c

## 2025-01-18 DIAGNOSIS — E11.29 TYPE 2 DIABETES MELLITUS WITH MICROALBUMINURIA, WITHOUT LONG-TERM CURRENT USE OF INSULIN (HCC): ICD-10-CM

## 2025-01-18 DIAGNOSIS — R80.9 TYPE 2 DIABETES MELLITUS WITH MICROALBUMINURIA, WITHOUT LONG-TERM CURRENT USE OF INSULIN (HCC): ICD-10-CM

## 2025-01-20 RX ORDER — LANCETS 30 GAUGE
EACH MISCELLANEOUS
Qty: 100 EACH | Refills: 5 | Status: SHIPPED | OUTPATIENT
Start: 2025-01-20

## 2025-02-03 NOTE — PROGRESS NOTES
Assessment:  1. Lumbar spondylosis        Plan:  Patient will be scheduled for bilateral L3-5 RFA as requested by orthopedic surgery.  Upon discussion patient did have greater than 80% relief of bilateral low back pain for at least 4 to 6 hours  From medial branch block x 2  Continue with home exercise program  Follow-up after RFA or sooner if needed    History of Present Illness:    The patient is a 69 y.o. male with history of diabetes last seen on 10/10/2024 who presents for a follow up office visit in regards to chronicaxial low back pain which is nonradiating into the lower extremities.  Pain is exacerbated with standing or ambulating.  He has been evaluated by orthopedic surgery in December 2024 who recommended moving forward with radiofrequency ablation. When discussing with patient, he does report that both rounds of medial branch blocks provided 80% relief for at least 4-6 hours until the following morning.  He rates his pain a 6-7 out of 10 on the numeric pain    I have personally reviewed and/or updated the patient's past medical history, past surgical history, family history, social history, current medications, allergies, and vital signs today.       Review of Systems:    Review of Systems   Respiratory:  Negative for shortness of breath.    Cardiovascular:  Negative for chest pain.   Gastrointestinal:  Negative for constipation, diarrhea, nausea and vomiting.   Musculoskeletal:  Positive for back pain and gait problem. Negative for arthralgias, joint swelling and myalgias.   Skin:  Negative for rash.   Neurological:  Negative for dizziness, seizures and weakness.   All other systems reviewed and are negative.        Past Medical History:   Diagnosis Date    Candidal intertrigo 10/16/2015    Diabetes mellitus (HCC)     Thyroid nodule 10/16/2015       History reviewed. No pertinent surgical history.    History reviewed. No pertinent family history.    Social History     Occupational History    Not on  file   Tobacco Use    Smoking status: Never    Smokeless tobacco: Never   Vaping Use    Vaping status: Never Used   Substance and Sexual Activity    Alcohol use: Yes     Alcohol/week: 1.0 standard drink of alcohol     Types: 1 Cans of beer per week    Drug use: No    Sexual activity: Not on file         Current Outpatient Medications:     atorvastatin (LIPITOR) 40 mg tablet, TAKE 1 TABLET (40 MG TOTAL) BY MOUTH IN THE MORNING, Disp: 90 tablet, Rfl: 2    azelastine (OPTIVAR) 0.05 % ophthalmic solution, , Disp: , Rfl:     clotrimazole-betamethasone (LOTRISONE) 1-0.05 % lotion, Apply topically 2 (two) times a day, Disp: 30 mL, Rfl: 0    glipiZIDE (GLUCOTROL XL) 5 mg 24 hr tablet, TAKE 1 TABLET (5 MG TOTAL) BY MOUTH IN THE MORNING, Disp: 90 tablet, Rfl: 2    Jardiance 10 MG TABS tablet, TAKE 1 TABLET (10 MG TOTAL) BY MOUTH EVERY MORNING., Disp: 90 tablet, Rfl: 3    lisinopril (ZESTRIL) 5 mg tablet, TAKE 1 TABLET (5 MG TOTAL) BY MOUTH DAILY., Disp: 90 tablet, Rfl: 0    OneTouch Verio test strip, CHECK FASTING BLOOD SUGAR EVERY OTHER DAY IN THE MORNING BEFORE BREAKFAST., Disp: 25 strip, Rfl: 11    Blood Glucose Monitoring Suppl (FreeStyle Lite) BATSHEVA, Check fasting blood sugar every other day in the morning before breakfast., Disp: 1 each, Rfl: 0    Lancets (OneTouch Delica Plus [id number]) MISC, CHECK FASTING BLOOD SUGAR EVERY OTHER DAY IN THE MORNING BEFORE BREAKFAST., Disp: 100 each, Rfl: 5    Allergies   Allergen Reactions    Acetaminophen Diarrhea, Nausea Only, Dizziness and Lightheadedness     Liquid only        Physical Exam:    Wt 69.4 kg (153 lb)   BMI 24.47 kg/m²     Constitutional:normal, well developed, well nourished, alert, in no distress and non-toxic and no overt pain behavior.  Eyes:anicteric  HEENT:grossly intact  Neck:supple, symmetric, trachea midline and no masses   Pulmonary:even and unlabored  Cardiovascular:No edema or pitting edema present  Skin:Normal without rashes or lesions and well  hydrated  Psychiatric:Mood and affect appropriate  Neurologic:Cranial Nerves II-XII grossly intact  Musculoskeletal:antalgic gait.  Positive lumbar facet loading maneuvers      Imaging  FL spine and pain procedure    (Results Pending)   FL spine and pain procedure    (Results Pending)     MRI LUMBAR SPINE WITHOUT CONTRAST     INDICATION: M48.062: Spinal stenosis, lumbar region with neurogenic claudication.     COMPARISON: 4/22/2024; 3/28/2024     TECHNIQUE:  Multiplanar, multisequence imaging of the lumbar spine was performed. .        IMAGE QUALITY:  Diagnostic     FINDINGS:     VERTEBRAL BODIES:  There are 5 lumbar type vertebral bodies. Moderate levoscoliosis mid lumbar spine centered at the L3 level. Trace grade 1 retrolisthesis of L3 on L4. Alignment is similar to the previous radiograph. Scattered degenerative endplate   changes.  No focally suspicious marrow lesions.  No bone marrow edema or compression abnormality.     SACRUM:  Normal signal within the sacrum. No evidence of insufficiency or stress fracture.     DISTAL CORD AND CONUS:  Normal size and signal within the distal cord and conus. The conus medullaris terminates at the L1-2 level.     PARASPINAL SOFT TISSUES: Mild asymmetric atrophy of the left psoas muscle.     LOWER THORACIC DISC SPACES:  Normal disc height and signal.  No disc herniation, canal stenosis or foraminal narrowing.     LUMBAR DISC SPACES:     L1-L2: There is bilateral facet hypertrophy. There is a left subarticular zone/neural foraminal disc protrusion. Mild narrowing left subarticular zone and neural foramen. Central canal and right neural foramen patent.     L2-L3: There is loss of disc height. There is bilateral facet hypertrophy which is mild. There is a right neural foraminal disc protrusion. Mild right neural foraminal narrowing. Central canal patent. Mild narrowing left subarticular zone.     L3-L4: There is a central disc herniation, protrusion type extending eccentrically  into the right subarticular zone/neural foramen. There is bilateral facet hypertrophy. Severe narrowing of the central canal. Moderate narrowing of the right subarticular   zone and right neural foramen. Mild left neural foraminal narrowing.     L4-L5: There is loss of disc height and signal. There is bilateral facet hypertrophy. There is a central/left paracentral disc protrusion extending into the left subarticular recess and neural foramen. Severe narrowing of the central canal, left   subarticular recess and left neural foramen. Mild narrowing of the right subarticular recess and right neural foramen.     L5-S1: There is bilateral facet hypertrophy. There is a left neural foraminal disc protrusion. Severe narrowing of the left neural foramen. Central canal mildly narrowed. Right neural foramen patent.     OTHER FINDINGS:  None.     IMPRESSION:     Advanced multilevel spondylosis most pronounced at L3-4, L4-5 and L5-S1.          Orders Placed This Encounter   Procedures    FL spine and pain procedure    FL spine and pain procedure

## 2025-02-04 ENCOUNTER — OFFICE VISIT (OUTPATIENT)
Dept: PAIN MEDICINE | Facility: CLINIC | Age: 70
End: 2025-02-04
Payer: COMMERCIAL

## 2025-02-04 VITALS — BODY MASS INDEX: 24.47 KG/M2 | WEIGHT: 153 LBS

## 2025-02-04 DIAGNOSIS — M47.816 LUMBAR SPONDYLOSIS: Primary | ICD-10-CM

## 2025-02-04 PROCEDURE — 99214 OFFICE O/P EST MOD 30 MIN: CPT | Performed by: NURSE PRACTITIONER

## 2025-02-04 PROCEDURE — G2211 COMPLEX E/M VISIT ADD ON: HCPCS | Performed by: NURSE PRACTITIONER

## 2025-02-16 NOTE — TELEPHONE ENCOUNTER
Please refill as appropriate as you are following him now, thank you! Assistance with ambulation/Assistance OOB with selected safe patient handling equipment/Communicate Risk of Fall with Harm to all staff/Discuss with provider need for PT consult/Monitor gait and stability/Provide patient with walking aids - walker, cane, crutches/Reinforce activity limits and safety measures with patient and family/Tailored Fall Risk Interventions/Visual Cue: Yellow wristband and red socks/Bed in lowest position, wheels locked, appropriate side rails in place/Call bell, personal items and telephone in reach/Instruct patient to call for assistance before getting out of bed or chair/Non-slip footwear when patient is out of bed/[place] to call system/Physically safe environment - no spills, clutter or unnecessary equipment/Purposeful Proactive Rounding/Room/bathroom lighting operational, light cord in reach

## 2025-02-26 ENCOUNTER — TELEPHONE (OUTPATIENT)
Dept: RADIOLOGY | Facility: CLINIC | Age: 70
End: 2025-02-26

## 2025-02-26 ENCOUNTER — HOSPITAL ENCOUNTER (OUTPATIENT)
Dept: RADIOLOGY | Facility: CLINIC | Age: 70
Discharge: HOME | End: 2025-02-26
Payer: COMMERCIAL

## 2025-02-26 VITALS
SYSTOLIC BLOOD PRESSURE: 130 MMHG | DIASTOLIC BLOOD PRESSURE: 81 MMHG | OXYGEN SATURATION: 97 % | HEART RATE: 82 BPM | TEMPERATURE: 97.7 F | RESPIRATION RATE: 18 BRPM

## 2025-02-26 DIAGNOSIS — M47.816 LUMBAR SPONDYLOSIS: ICD-10-CM

## 2025-02-26 RX ORDER — BUPIVACAINE HYDROCHLORIDE 5 MG/ML
3 INJECTION, SOLUTION EPIDURAL; INTRACAUDAL ONCE
Status: COMPLETED | OUTPATIENT
Start: 2025-02-26 | End: 2025-02-26

## 2025-02-26 RX ORDER — LIDOCAINE HYDROCHLORIDE 10 MG/ML
8 INJECTION, SOLUTION EPIDURAL; INFILTRATION; INTRACAUDAL; PERINEURAL ONCE
Status: COMPLETED | OUTPATIENT
Start: 2025-02-26 | End: 2025-02-26

## 2025-02-26 RX ADMIN — LIDOCAINE HYDROCHLORIDE 3 ML: 20 INJECTION, SOLUTION EPIDURAL; INFILTRATION; INTRACAUDAL; PERINEURAL at 14:20

## 2025-02-26 RX ADMIN — LIDOCAINE HYDROCHLORIDE 8 ML: 10 INJECTION, SOLUTION EPIDURAL; INFILTRATION; INTRACAUDAL; PERINEURAL at 14:25

## 2025-02-26 RX ADMIN — BUPIVACAINE HYDROCHLORIDE 3 ML: 5 INJECTION, SOLUTION EPIDURAL; INTRACAUDAL; PERINEURAL at 14:25

## 2025-02-26 NOTE — DISCHARGE INSTR - LAB

## 2025-02-27 ENCOUNTER — OFFICE VISIT (OUTPATIENT)
Dept: FAMILY MEDICINE CLINIC | Facility: CLINIC | Age: 70
End: 2025-02-27

## 2025-02-27 VITALS
HEIGHT: 66 IN | TEMPERATURE: 98.4 F | BODY MASS INDEX: 24.59 KG/M2 | SYSTOLIC BLOOD PRESSURE: 128 MMHG | HEART RATE: 87 BPM | DIASTOLIC BLOOD PRESSURE: 65 MMHG | RESPIRATION RATE: 18 BRPM | OXYGEN SATURATION: 97 % | WEIGHT: 153 LBS

## 2025-02-27 DIAGNOSIS — Z00.00 MEDICARE ANNUAL WELLNESS VISIT, SUBSEQUENT: Primary | ICD-10-CM

## 2025-02-27 DIAGNOSIS — E11.29 TYPE 2 DIABETES MELLITUS WITH MICROALBUMINURIA, WITHOUT LONG-TERM CURRENT USE OF INSULIN (HCC): ICD-10-CM

## 2025-02-27 DIAGNOSIS — R80.9 TYPE 2 DIABETES MELLITUS WITH MICROALBUMINURIA, WITHOUT LONG-TERM CURRENT USE OF INSULIN (HCC): ICD-10-CM

## 2025-02-27 PROCEDURE — G0439 PPPS, SUBSEQ VISIT: HCPCS

## 2025-02-27 NOTE — TELEPHONE ENCOUNTER
S/w the patient and he stated he hasn't gotten up yet. He doesn't know how he feels but he stated the numbing wore off last night. Reviewed the postoperative instructions and he appreciated the call.

## 2025-02-27 NOTE — PROGRESS NOTES
Name: Jovani Stratton      : 1955      MRN: [id number]  Encounter Provider: Joaquín Delacruz DO  Encounter Date: 2025   Encounter department: [facility]    Assessment & Plan  Medicare annual wellness visit, subsequent  Doing well, currently back pain being managed by pain center.   Recently underwent nerve block; feel better, has scheduled follow up            Type 2 diabetes mellitus with microalbuminuria, without long-term current use of insulin ([facility])    Lab Results   Component Value Date    HGBA1C 6.8 (A) 2025   Pending Albumin/Cr   Well controlled on Aprodine and Glipizide   Will be going to eye center for check up - deferred diabetic IRIS exam for now   Follow up in 2 months    Orders:  •  Ambulatory referral to chronic care management; Future  •  Albumin / creatinine urine ratio; Future      Depression Screening and Follow-up Plan: Patient was screened for depression during today's encounter. They screened negative with a PHQ-2 score of 2.      ASCVD Risk Management:  The 10-year ASCVD risk score (Tanmay DK, et al., 2019) is: 25.3%    Patent does not have underlying ASCVD. Their ASCVD Risk is high (>= 20%).    Preventive health issues were discussed with patient, and age appropriate screening tests were ordered as noted in patient's After Visit Summary. Personalized health advice and appropriate referrals for health education or preventive services given if needed, as noted in patient's After Visit Summary.    History of Present Illness     HPI   Patient Care Team:  Joaquín Delacruz DO as PCP - General (Family Medicine)  Tyler Valenzuela MD as PCP - PCP-[facility] ([facility])    Review of Systems   Constitutional:  Negative for chills and fever.   HENT:  Negative for ear pain and sore throat.    Eyes:  Negative for pain and visual disturbance.   Respiratory:  Negative for cough and shortness of breath.    Cardiovascular:  Negative for chest pain and palpitations.    Gastrointestinal:  Negative for abdominal pain and vomiting.   Genitourinary:  Negative for dysuria and hematuria.   Musculoskeletal:  Negative for arthralgias and back pain.   Skin:  Negative for color change and rash.   Neurological:  Negative for seizures and syncope.   All other systems reviewed and are negative.    Medical History Reviewed by provider this encounter:       Annual Wellness Visit Questionnaire   Jovani is here for his Subsequent Wellness visit. Last Medicare Wellness visit information reviewed, patient interviewed, no change since last AWV.     Health Risk Assessment:   Patient rates overall health as good. Patient feels that their physical health rating is same. Patient is satisfied with their life. Eyesight was rated as slightly better. Hearing was rated as same. Patient feels that their emotional and mental health rating is same. Patients states they are never, rarely angry. Patient states they are never, rarely unusually tired/fatigued. Pain experienced in the last 7 days has been some. Patient's pain rating has been 4/10. Patient states that he has experienced no weight loss or gain in last 6 months.     Depression Screening:   PHQ-2 Score: 2      Fall Risk Screening:   In the past year, patient has experienced: history of falling in past year    Number of falls: 1  Injured during fall?: No    Feels unsteady when standing or walking?: Yes      Home Safety:  Patient has trouble with stairs inside or outside of their home. Patient has working smoke alarms and has no working carbon monoxide detector. Home safety hazards include: none.     Nutrition:   Current diet is Regular.     Medications:   Patient is currently taking over-the-counter supplements. OTC medications include: see medication list. Patient is able to manage medications.     Activities of Daily Living (ADLs)/Instrumental Activities of Daily Living (IADLs):   Walk and transfer into and out of bed and chair?: Yes  Dress and groom  yourself?: Yes    Bathe or shower yourself?: Yes    Feed yourself? Yes  Do your laundry/housekeeping?: Yes  Manage your money, pay your bills and track your expenses?: Yes  Make your own meals?: Yes    Do your own shopping?: Yes    Previous Hospitalizations:   Any hospitalizations or ED visits within the last 12 months?: No      Advance Care Planning:   Living will: Yes    Advanced directive: Yes      Comments: Brother - Michael KAYEArun (Decision maker)   Cousin Guillermo Diaz     PREVENTIVE SCREENINGS      Cardiovascular Screening:    General: Screening Not Indicated and History Lipid Disorder      Diabetes Screening:     General: Screening Not Indicated and History Diabetes      Colorectal Cancer Screening:     General: Screening Current      Abdominal Aortic Aneurysm (AAA) Screening:    Risk factors include: age between 65-74 yo        Lung Cancer Screening:     General: Screening Not Indicated      Hepatitis C Screening:    General: Screening Current    Screening, Brief Intervention, and Referral to Treatment (SBIRT)     Screening      Single Item Drug Screening:  How often have you used an illegal drug (including marijuana) or a prescription medication for non-medical reasons in the past year? never    Single Item Drug Screen Score: 0  Interpretation: Negative screen for possible drug use disorder    SDOH Risk Assessment  Social determinants of health (SDOH) risk assesment tool was completed. The tool at a minimum covered housing stability, food insecurity, transportation needs, and utility difficulty. Patient had at risk responses for the following SDOH domains: food insecurity.     Social Drivers of Health     Financial Resource Strain: Medium Risk (2/26/2025)    Overall Financial Resource Strain (CARDIA)    • Difficulty of Paying Living Expenses: Somewhat hard   Food Insecurity: Food Insecurity Present (2/26/2025)    Hunger Vital Sign    • Worried About Running Out of Food in the Last Year: Sometimes true    • Ran  "Out of Food in the Last Year: Never true   Transportation Needs: No Transportation Needs (2/26/2025)    PRAPARE - Transportation    • Lack of Transportation (Medical): No    • Lack of Transportation (Non-Medical): No   Housing Stability: Low Risk  (2/26/2025)    Housing Stability Vital Sign    • Unable to Pay for Housing in the Last Year: No    • Number of Times Moved in the Last Year: 0    • Homeless in the Last Year: No   Utilities: Not At Risk (2/26/2025)    [facility] Utilities    • Threatened with loss of utilities: No     No results found.    Objective   /65   Pulse 87   Temp 98.4 °F (36.9 °C) (Temporal)   Resp 18   Ht 5' 6\" (1.676 m)   Wt 69.4 kg (153 lb)   SpO2 97%   BMI 24.69 kg/m²     Physical Exam  Vitals and nursing note reviewed.   Constitutional:       General: He is not in acute distress.     Appearance: He is well-developed.   HENT:      Head: Normocephalic and atraumatic.   Eyes:      Conjunctiva/sclera: Conjunctivae normal.   Cardiovascular:      Rate and Rhythm: Normal rate and regular rhythm.      Heart sounds: No murmur heard.  Pulmonary:      Effort: Pulmonary effort is normal. No respiratory distress.      Breath sounds: Normal breath sounds.   Abdominal:      Palpations: Abdomen is soft.      Tenderness: There is no abdominal tenderness.   Musculoskeletal:         General: No swelling.      Cervical back: Neck supple.   Skin:     General: Skin is warm and dry.      Capillary Refill: Capillary refill takes less than 2 seconds.   Neurological:      Mental Status: He is alert.   Psychiatric:         Mood and Affect: Mood normal.       "

## 2025-02-27 NOTE — ASSESSMENT & PLAN NOTE
Doing well, currently back pain being managed by pain center.   Recently underwent nerve block; feel better, has scheduled follow up

## 2025-02-27 NOTE — ASSESSMENT & PLAN NOTE
Lab Results   Component Value Date    HGBA1C 6.8 (A) 01/02/2025   Pending Albumin/Cr   Well controlled on Aprodine and Glipizide   Will be going to eye center for check up - deferred diabetic IRIS exam for now   Follow up in 2 months    Orders:    Ambulatory referral to chronic care management; Future    Albumin / creatinine urine ratio; Future

## 2025-02-28 ENCOUNTER — TELEPHONE (OUTPATIENT)
Age: 70
End: 2025-02-28

## 2025-02-28 NOTE — TELEPHONE ENCOUNTER
Caller: Jovani Stratton    Doctor and/or Office: Dr. Badillo/Mady    #: [phone]    Escalation: Care Patient has some general questions about RFC. He has an appt in April but went to a salon as he has a lot of dry skin on his feet. They turned him away and said he needed to see a dr. He would like to ask some questions about this appt. Thank you

## 2025-03-05 ENCOUNTER — PATIENT OUTREACH (OUTPATIENT)
Dept: CASE MANAGEMENT | Facility: HOSPITAL | Age: 70
End: 2025-03-05

## 2025-03-05 DIAGNOSIS — R80.9 TYPE 2 DIABETES MELLITUS WITH MICROALBUMINURIA, WITHOUT LONG-TERM CURRENT USE OF INSULIN (HCC): Primary | ICD-10-CM

## 2025-03-05 DIAGNOSIS — E11.29 TYPE 2 DIABETES MELLITUS WITH MICROALBUMINURIA, WITHOUT LONG-TERM CURRENT USE OF INSULIN (HCC): Primary | ICD-10-CM

## 2025-03-05 NOTE — PROGRESS NOTES
Outpatient Care Management Note:    Re:  chronic care management     Patient identified for chronic care management program. Chart reviewed. Patient has history of type 2 diabetes, hyperlipidemia, chronic low back pain, lumbar radiculopathy, and lumbar spondylosis. Patient last seen at PCP office on 2/27/25 for AWV visit. Patient follows with pain management. Next appointment on 3/12/25. Seen by Orthopedic surgery in December who recommended radiofrequency ablation. Follows with podiatry.     Last A1C was 6.8% on 1/2/25.   Next PCP appointment on 5/1/25.     I called patient [phone] and phone rings and then hear a clicking noise. Unable to leave message. Tried number twice. I called patient at [phone] and no answer and message left with my name, role, reason for outreach and requested a call back. Contact number left.

## 2025-03-12 ENCOUNTER — PATIENT OUTREACH (OUTPATIENT)
Dept: FAMILY MEDICINE CLINIC | Facility: CLINIC | Age: 70
End: 2025-03-12

## 2025-03-12 ENCOUNTER — HOSPITAL ENCOUNTER (OUTPATIENT)
Dept: RADIOLOGY | Facility: CLINIC | Age: 70
Discharge: HOME | End: 2025-03-12
Payer: COMMERCIAL

## 2025-03-12 ENCOUNTER — TELEPHONE (OUTPATIENT)
Dept: RADIOLOGY | Facility: CLINIC | Age: 70
End: 2025-03-12

## 2025-03-12 VITALS
OXYGEN SATURATION: 99 % | SYSTOLIC BLOOD PRESSURE: 131 MMHG | TEMPERATURE: 97.6 F | DIASTOLIC BLOOD PRESSURE: 84 MMHG | HEART RATE: 82 BPM | RESPIRATION RATE: 18 BRPM

## 2025-03-12 DIAGNOSIS — M47.816 LUMBAR SPONDYLOSIS: ICD-10-CM

## 2025-03-12 PROCEDURE — 64636 DESTROY L/S FACET JNT ADDL: CPT | Performed by: ANESTHESIOLOGY

## 2025-03-12 PROCEDURE — 64635 DESTROY LUMB/SAC FACET JNT: CPT | Performed by: ANESTHESIOLOGY

## 2025-03-12 RX ORDER — LIDOCAINE HYDROCHLORIDE 10 MG/ML
8 INJECTION, SOLUTION EPIDURAL; INFILTRATION; INTRACAUDAL; PERINEURAL ONCE
Status: COMPLETED | OUTPATIENT
Start: 2025-03-12 | End: 2025-03-12

## 2025-03-12 RX ORDER — BUPIVACAINE HYDROCHLORIDE 5 MG/ML
3 INJECTION, SOLUTION EPIDURAL; INTRACAUDAL; PERINEURAL ONCE
Status: COMPLETED | OUTPATIENT
Start: 2025-03-12 | End: 2025-03-12

## 2025-03-12 RX ADMIN — BUPIVACAINE HYDROCHLORIDE 3 ML: 5 INJECTION, SOLUTION EPIDURAL; INTRACAUDAL; PERINEURAL at 14:50

## 2025-03-12 RX ADMIN — LIDOCAINE HYDROCHLORIDE 8 ML: 10 INJECTION, SOLUTION EPIDURAL; INFILTRATION; INTRACAUDAL; PERINEURAL at 14:42

## 2025-03-12 RX ADMIN — LIDOCAINE HYDROCHLORIDE 3 ML: 20 INJECTION, SOLUTION EPIDURAL; INFILTRATION; INTRACAUDAL at 14:50

## 2025-03-12 NOTE — PROGRESS NOTES
"Outpatient Care Management Note:    Re:  chronic care management     Patient identified for chronic care management program. Chart reviewed. Patient has history of type 2 diabetes, hyperlipidemia, chronic low back pain, lumbar radiculopathy, and lumbar spondylosis. Patient last seen at PCP office on 2/27/25 for AWV visit. Patient follows with pain management. Next appointment on 3/12/25. Seen by Orthopedic surgery in December who recommended radiofrequency ablation. Follows with podiatry.      Last A1C was 6.8% on 1/2/25.   Next PCP appointment on 5/1/25.      2nd attempt to reach. I called patient [phone] and phone rings and then hear a clicking noise. Unable to leave message. I called patient at [phone] and was able to speak with him. I explained my role and reason for call. Patient feels he is managing ok at this time and declines need for further follow up.     Patient has all of his medications. He is taking glipizide 5 mg daily and Jardiance 10 mg daily for his diabetes. Patient asking if he has hypertension as he is on lisinopril. Reviewed with patient he is on lisinopril because of being diabetic and helps protect his kidneys and reducing urinary albumin excretion. I also reviewed Jardiance also has kidney and heart benefits.     Patient checks his blood sugar every other days in the morning when he gets up. He reports average 90 day blood sugar is 146.     Patient reports he does try to be mindful of his diet. Encouraged to watch portion sizes of potatoes, rice, pasta, bread and sweets, Encouraged to not drink sugary drinks. Patient shares that he does drink wine occasionally and every now and then whiskey and reports \"castillo them down.\" Patient reports he has cut back significantly on his drinking. I did offer additional resources but declining need for additional resources at this time. Encouraged patient to reach out to PCP office with any further questions, concerns, or needs.     Patient " aware of pain management appointment this afternoon. Patient shares his back pain does affect he physical mobility. Patient aware of upcoming foot doctor appointment on 4/14 and PCP appointment on 5/1.     Please re-consult as needed.

## 2025-03-12 NOTE — H&P
History of Present Illness: The patient is a 69 y.o. male who presents with complaints of low back pain.    Past Medical History:   Diagnosis Date    Candidal intertrigo 10/16/2015    Diabetes mellitus (HCC)     Thyroid nodule 10/16/2015       History reviewed. No pertinent surgical history.      Current Outpatient Medications:     atorvastatin (LIPITOR) 40 mg tablet, TAKE 1 TABLET (40 MG TOTAL) BY MOUTH IN THE MORNING, Disp: 90 tablet, Rfl: 2    azelastine (OPTIVAR) 0.05 % ophthalmic solution, , Disp: , Rfl:     Blood Glucose Monitoring Suppl (FreeStyle Lite) BATSHEVA, Check fasting blood sugar every other day in the morning before breakfast., Disp: 1 each, Rfl: 0    clotrimazole-betamethasone (LOTRISONE) 1-0.05 % lotion, Apply topically 2 (two) times a day, Disp: 30 mL, Rfl: 0    glipiZIDE (GLUCOTROL XL) 5 mg 24 hr tablet, TAKE 1 TABLET (5 MG TOTAL) BY MOUTH IN THE MORNING, Disp: 90 tablet, Rfl: 2    Jardiance 10 MG TABS tablet, TAKE 1 TABLET (10 MG TOTAL) BY MOUTH EVERY MORNING., Disp: 90 tablet, Rfl: 3    Lancets (OneTouch Delica Plus Oafupa89C) MISC, CHECK FASTING BLOOD SUGAR EVERY OTHER DAY IN THE MORNING BEFORE BREAKFAST., Disp: 100 each, Rfl: 5    lisinopril (ZESTRIL) 5 mg tablet, TAKE 1 TABLET (5 MG TOTAL) BY MOUTH DAILY., Disp: 90 tablet, Rfl: 0    OneTouch Verio test strip, CHECK FASTING BLOOD SUGAR EVERY OTHER DAY IN THE MORNING BEFORE BREAKFAST., Disp: 25 strip, Rfl: 11    Allergies   Allergen Reactions    Acetaminophen Diarrhea, Nausea Only, Dizziness and Lightheadedness     Liquid only        Physical Exam:   Vitals:    03/12/25 1412   BP: 136/77   Pulse: 71   Resp: 18   Temp: 97.6 °F (36.4 °C)   SpO2: 99%     General: Awake, Alert, Oriented x 3, Mood and affect appropriate  Respiratory: Respirations even and unlabored  Cardiovascular: Peripheral pulses intact; no edema  Musculoskeletal Exam: normal gait    ASA Score: 2         Assessment:   1. Lumbar spondylosis        Plan: Right L3-5 RFA

## 2025-03-12 NOTE — DISCHARGE INSTR - LAB

## 2025-03-12 NOTE — PROGRESS NOTES
Chronic Care Management Program Consent:    Patient informed of availability of Chronic Care Management services. The services will billed monthly by their Primary Care Provider only. Patient is informed there may be a monthly cost sharing associated with the Chronic Care Management services. Patient is aware that financial counseling is available to assist with any co-pay questions or concerns.    Chronic Care Management services include:    24/7 access to care.  Comprehensive plan of care created by the provider.  Individualized care planning by the care manager(s).  Transitional care support.    The patient is informed that they have the right to stop Chronic Care Management services at anytime.       Patient consents to Chronic Care Management services? No      Patient informed that consent is needed only once unless the patient switches qualifying providers.

## 2025-03-13 NOTE — TELEPHONE ENCOUNTER
S/w the patient and he stated he is just waking up but so far he has little pain in the area. He stated he does have one spot that is sore at his belt line but then he stated it is probably because it is rubbing it. He didn't take the band aids off yet but will do so later. Reviewed the postop instructions again with him and he appreciated the call.    Clerical: Please call later to schedule a 6 week F/U. Thanks!

## 2025-03-14 DIAGNOSIS — E11.9 TYPE 2 DIABETES MELLITUS WITHOUT COMPLICATION, WITHOUT LONG-TERM CURRENT USE OF INSULIN (HCC): ICD-10-CM

## 2025-03-14 RX ORDER — EMPAGLIFLOZIN 10 MG/1
10 TABLET, FILM COATED ORAL EVERY MORNING
Qty: 90 TABLET | Refills: 3 | Status: SHIPPED | OUTPATIENT
Start: 2025-03-14

## 2025-03-29 PROBLEM — Z00.00 MEDICARE ANNUAL WELLNESS VISIT, SUBSEQUENT: Status: RESOLVED | Noted: 2025-02-27 | Resolved: 2025-03-29

## 2025-04-14 ENCOUNTER — OFFICE VISIT (OUTPATIENT)
Dept: PODIATRY | Facility: CLINIC | Age: 70
End: 2025-04-14
Payer: COMMERCIAL

## 2025-04-14 VITALS — HEIGHT: 66 IN | BODY MASS INDEX: 24.43 KG/M2 | WEIGHT: 152 LBS

## 2025-04-14 DIAGNOSIS — E11.8 TYPE II DIABETES MELLITUS WITH COMPLICATION (HCC): Primary | ICD-10-CM

## 2025-04-14 PROCEDURE — 99213 OFFICE O/P EST LOW 20 MIN: CPT | Performed by: PODIATRIST

## 2025-04-14 NOTE — PROGRESS NOTES
PATIENT:  Jovani Stratton  1955      ASSESSMENT/PLAN:  1. Type II diabetes mellitus with complication (HCC)          1. Reviewed medical records.  Educated disease prevention and risks related to diabetes.    2. Educated proper daily foot care and hygiene.  Loose skin was removed.  No ulcer in his feet.  Instructed wash his feet with soaking in soap water.  His brother lives with him and instructed him to get a help from his brother for general hygiene.  Nails also debrided.    3. The recent blood work was reviewed and the last HbA1c was 6.8.  Continue tight BS control.     4. He will return in 3 months for foot care.    HPI:  Jovani Stratton is a 69 y.o.year old male, presents for diabetic foot exam.  BS under control.  He has not trimmed his nails since the last visit.  He has poor foot hygienes and cannot wash his feet at home.  The patient denied any acute pedal disorder, redness, acute swelling, or recent injury.          PAST MEDICAL HISTORY:  Past Medical History:   Diagnosis Date    Arthritis     Candidal intertrigo 10/16/2015    Diabetes mellitus (HCC)     Difficulty walking     Disease of thyroid gland 10/16/2015    GERD (gastroesophageal reflux disease) ?    not sure what it is    Kidney stone ?    never recorded the date    Thyroid nodule 10/16/2015       PAST SURGICAL HISTORY:  Past Surgical History:   Procedure Laterality Date    EYE SURGERY  ?    eye brow surgery    HERNIA REPAIR      TRIGGER POINT INJECTION          ALLERGIES:  Acetaminophen    MEDICATIONS:  Current Outpatient Medications   Medication Sig Dispense Refill    atorvastatin (LIPITOR) 40 mg tablet TAKE 1 TABLET (40 MG TOTAL) BY MOUTH IN THE MORNING 90 tablet 2    azelastine (OPTIVAR) 0.05 % ophthalmic solution       clotrimazole-betamethasone (LOTRISONE) 1-0.05 % lotion Apply topically 2 (two) times a day 30 mL 0    glipiZIDE (GLUCOTROL XL) 5 mg 24 hr tablet TAKE 1 TABLET (5 MG TOTAL) BY MOUTH IN THE MORNING 90 tablet 2     Jardiance 10 MG TABS tablet TAKE 1 TABLET (10 MG TOTAL) BY MOUTH EVERY MORNING. 90 tablet 3    lisinopril (ZESTRIL) 5 mg tablet TAKE 1 TABLET (5 MG TOTAL) BY MOUTH DAILY. 90 tablet 0    OneTouch Verio test strip CHECK FASTING BLOOD SUGAR EVERY OTHER DAY IN THE MORNING BEFORE BREAKFAST. 25 strip 11     No current facility-administered medications for this visit.       SOCIAL HISTORY:  Social History     Socioeconomic History    Marital status: Single     Spouse name: None    Number of children: None    Years of education: None    Highest education level: None   Occupational History    None   Tobacco Use    Smoking status: Never    Smokeless tobacco: Never   Vaping Use    Vaping status: Never Used   Substance and Sexual Activity    Alcohol use: Not Currently     Alcohol/week: 3.0 standard drinks of alcohol     Types: 3 Glasses of wine per week     Comment: very little wine in winter, 1/8 of a glass? more recently    Drug use: No    Sexual activity: Never   Other Topics Concern    None   Social History Narrative    None     Social Drivers of Health     Financial Resource Strain: Medium Risk (2/26/2025)    Overall Financial Resource Strain (CARDIA)     Difficulty of Paying Living Expenses: Somewhat hard   Food Insecurity: Food Insecurity Present (2/26/2025)    Hunger Vital Sign     Worried About Running Out of Food in the Last Year: Sometimes true     Ran Out of Food in the Last Year: Never true   Transportation Needs: No Transportation Needs (2/26/2025)    PRAPARE - Transportation     Lack of Transportation (Medical): No     Lack of Transportation (Non-Medical): No   Physical Activity: Inactive (1/2/2025)    Exercise Vital Sign     Days of Exercise per Week: 0 days     Minutes of Exercise per Session: 0 min   Stress: No Stress Concern Present (1/2/2025)    [employer] [place] [employer] - Occupational Stress Questionnaire     Feeling of Stress : Not at all   Social Connections: Unknown (1/2/2025)    Social  "Connection and Isolation Panel [NHANES]     Frequency of Communication with Friends and Family: Twice a week     Frequency of Social Gatherings with Friends and Family: Twice a week     Attends Islam Services: Never     Active Member of Clubs or Organizations: No     Attends Club or Organization Meetings: Never     Marital Status: Not on file   Intimate Partner Violence: Not At Risk (1/2/2025)    Humiliation, Afraid, Rape, and Kick questionnaire     Fear of Current or Ex-Partner: No     Emotionally Abused: No     Physically Abused: No     Sexually Abused: No   Housing Stability: Low Risk  (2/26/2025)    Housing Stability Vital Sign     Unable to Pay for Housing in the Last Year: No     Number of Times Moved in the Last Year: 0     Homeless in the Last Year: No        REVIEW OF SYSTEMS:  GENERAL: No fever or chills  HEART: No chest pain, or palpitation  RESPIRATORY:  No acute SOB or cough  GI: No Nausea, vomit or diarrhea  NEUROLOGIC: No syncope or acute weakness    PHYSICAL EXAM:    Ht 5' 6\" (1.676 m)   Wt 68.9 kg (152 lb)   BMI 24.53 kg/m²     VASCULAR EXAM  Dorsalis pedis  +2, Posterior tibial artery  +1.  There is +1 lower extremity edema bilaterally.  CRT WNL.      NEUROLOGIC EXAM  Sensation is intact to light touch.  Sensation is intact to 10gm monofilament.  No focal neurologic deficit.          DERMATOLOGIC EXAM:   Plantar feet are covered with brown / black stains / dirt / skin grime with xerosis.  No ulcer or cellulitis noted.  The patient has hypertrophic toenails with discoloration, onycholysis, and subungal debris.      MUSCULOSKELETAL EXAM:   No acute joint pain, edema, or redness.  No acute musculoskeletal problem.          Diabetic Foot Exam    Patient's shoes and socks removed.    Right Foot/Ankle   Right Foot Inspection  Skin Exam: dry skin. No erythema, no pre-ulcer and no ulcer.     Toe Exam: No swelling, no tenderness, erythema and  no right toe deformity    Sensory   Proprioception: " intact  Monofilament testing: intact    Vascular  Capillary refills: < 3 seconds  The right DP pulse is 2+. The right PT pulse is 2+.     Left Foot/Ankle  Left Foot Inspection  Skin Exam: dry skin. No erythema, no pre-ulcer and no ulcer.     Toe Exam: No swelling, no tenderness, no erythema and no left toe deformity.     Sensory   Proprioception: intact  Monofilament testing: intact    Vascular  Capillary refills: < 3 seconds  The left DP pulse is 2+. The left PT pulse is 2+.     Assign Risk Category  No deformity present  No loss of protective sensation  No weak pulses  Risk: 0

## 2025-04-19 DIAGNOSIS — R80.9 TYPE 2 DIABETES MELLITUS WITH MICROALBUMINURIA, WITHOUT LONG-TERM CURRENT USE OF INSULIN (HCC): ICD-10-CM

## 2025-04-19 DIAGNOSIS — E11.29 TYPE 2 DIABETES MELLITUS WITH MICROALBUMINURIA, WITHOUT LONG-TERM CURRENT USE OF INSULIN (HCC): ICD-10-CM

## 2025-04-21 RX ORDER — LISINOPRIL 5 MG/1
5 TABLET ORAL DAILY
Qty: 90 TABLET | Refills: 0 | Status: SHIPPED | OUTPATIENT
Start: 2025-04-21

## 2025-05-01 ENCOUNTER — OFFICE VISIT (OUTPATIENT)
Dept: FAMILY MEDICINE CLINIC | Facility: CLINIC | Age: 70
End: 2025-05-01

## 2025-05-01 VITALS
OXYGEN SATURATION: 98 % | SYSTOLIC BLOOD PRESSURE: 129 MMHG | RESPIRATION RATE: 18 BRPM | TEMPERATURE: 98.3 F | HEART RATE: 96 BPM | DIASTOLIC BLOOD PRESSURE: 78 MMHG | BODY MASS INDEX: 23.95 KG/M2 | WEIGHT: 148.4 LBS

## 2025-05-01 DIAGNOSIS — R80.9 TYPE 2 DIABETES MELLITUS WITH MICROALBUMINURIA, WITHOUT LONG-TERM CURRENT USE OF INSULIN (HCC): Primary | ICD-10-CM

## 2025-05-01 DIAGNOSIS — E11.29 TYPE 2 DIABETES MELLITUS WITH MICROALBUMINURIA, WITHOUT LONG-TERM CURRENT USE OF INSULIN (HCC): Primary | ICD-10-CM

## 2025-05-01 LAB
LEFT EYE DIABETIC RETINOPATHY: ABNORMAL
LEFT EYE IMAGE QUALITY: ABNORMAL
LEFT EYE MACULAR EDEMA: POSITIVE
LEFT EYE OTHER RETINOPATHY: ABNORMAL
RIGHT EYE DIABETIC RETINOPATHY: ABNORMAL
RIGHT EYE IMAGE QUALITY: ABNORMAL
RIGHT EYE MACULAR EDEMA: ABNORMAL
RIGHT EYE OTHER RETINOPATHY: ABNORMAL
SEVERITY (EYE EXAM): ABNORMAL
SL AMB POCT HEMOGLOBIN AIC: 7.2 (ref ?–6.5)

## 2025-05-01 PROCEDURE — G2211 COMPLEX E/M VISIT ADD ON: HCPCS

## 2025-05-01 PROCEDURE — 83036 HEMOGLOBIN GLYCOSYLATED A1C: CPT

## 2025-05-01 PROCEDURE — 99214 OFFICE O/P EST MOD 30 MIN: CPT

## 2025-05-01 NOTE — PROGRESS NOTES
Name: Jovani Stratton      : 1955      MRN: [id number]  Encounter Provider: Joaquín Delacruz DO  Encounter Date: 2025   Encounter department: [facility] [place]  :  Assessment & Plan  Type 2 diabetes mellitus with microalbuminuria, without long-term current use of insulin (Formerly [facility])    Lab Results   Component Value Date    HGBA1C 7.2 (A) 2025   Slightly worsen compared to 3 months ago, admitted to adding more sugar products to diet   Continue with current regimen and trying to correct diet pattern that can worsen his diabetes      Orders:    POCT hemoglobin A1c    IRIS Diabetic eye exam    GFR; Future    Albumin / creatinine urine ratio; Future           History of Present Illness   HPI  69 years old male presents today for diabetic follow-up. Pt reported he has been eating more sweet recently. No other acute concern.   Review of Systems   Constitutional:  Negative for chills and fever.   HENT:  Negative for ear pain and sore throat.    Eyes:  Negative for pain and visual disturbance.   Respiratory:  Negative for cough and shortness of breath.    Cardiovascular:  Negative for chest pain and palpitations.   Gastrointestinal:  Negative for abdominal pain and vomiting.   Genitourinary:  Negative for dysuria and hematuria.   Musculoskeletal:  Negative for arthralgias and back pain.   Skin:  Negative for color change and rash.   Neurological:  Negative for seizures and syncope.   All other systems reviewed and are negative.      Objective   /78 (BP Location: Left arm, Patient Position: Sitting, Cuff Size: Standard)   Pulse 96   Temp 98.3 °F (36.8 °C) (Temporal)   Resp 18   Wt 67.3 kg (148 lb 6.4 oz)   SpO2 98%   BMI 23.95 kg/m²      Physical Exam  Vitals and nursing note reviewed.   Constitutional:       General: He is not in acute distress.     Appearance: He is well-developed.   HENT:      Head: Normocephalic and atraumatic.   Eyes:      Conjunctiva/sclera: Conjunctivae  normal.   Cardiovascular:      Rate and Rhythm: Normal rate and regular rhythm.      Heart sounds: No murmur heard.  Pulmonary:      Effort: Pulmonary effort is normal. No respiratory distress.      Breath sounds: Normal breath sounds.   Abdominal:      Palpations: Abdomen is soft.      Tenderness: There is no abdominal tenderness.   Musculoskeletal:         General: No swelling.      Cervical back: Neck supple.   Skin:     General: Skin is warm and dry.      Capillary Refill: Capillary refill takes less than 2 seconds.   Neurological:      Mental Status: He is alert.   Psychiatric:         Mood and Affect: Mood normal.

## 2025-05-01 NOTE — ASSESSMENT & PLAN NOTE
Lab Results   Component Value Date    HGBA1C 7.2 (A) 05/01/2025   Slightly worsen compared to 3 months ago, admitted to adding more sugar products to diet   Continue with current regimen and trying to correct diet pattern that can worsen his diabetes      Orders:    POCT hemoglobin A1c    IRIS Diabetic eye exam    GFR; Future    Albumin / creatinine urine ratio; Future

## 2025-05-02 ENCOUNTER — RESULTS FOLLOW-UP (OUTPATIENT)
Dept: FAMILY MEDICINE CLINIC | Facility: CLINIC | Age: 70
End: 2025-05-02

## 2025-05-02 DIAGNOSIS — E08.311 DIABETIC RETINOPATHY OF RIGHT EYE WITH MACULAR EDEMA ASSOCIATED WITH DIABETES MELLITUS DUE TO UNDERLYING CONDITION, UNSPECIFIED RETINOPATHY SEVERITY (HCC): Primary | ICD-10-CM

## 2025-05-23 ENCOUNTER — OFFICE VISIT (OUTPATIENT)
Dept: PODIATRY | Facility: CLINIC | Age: 70
End: 2025-05-23

## 2025-05-23 ENCOUNTER — OFFICE VISIT (OUTPATIENT)
Dept: PAIN MEDICINE | Facility: CLINIC | Age: 70
End: 2025-05-23
Payer: COMMERCIAL

## 2025-05-23 VITALS — WEIGHT: 150 LBS | HEIGHT: 66 IN | BODY MASS INDEX: 24.11 KG/M2

## 2025-05-23 VITALS — HEIGHT: 66 IN | WEIGHT: 150 LBS | BODY MASS INDEX: 24.11 KG/M2

## 2025-05-23 DIAGNOSIS — L85.3 XEROSIS OF SKIN: ICD-10-CM

## 2025-05-23 DIAGNOSIS — E11.8 TYPE II DIABETES MELLITUS WITH COMPLICATION (HCC): Primary | ICD-10-CM

## 2025-05-23 DIAGNOSIS — S81.811A SKIN TEAR OF LOWER LEG WITHOUT COMPLICATION, RIGHT, INITIAL ENCOUNTER: ICD-10-CM

## 2025-05-23 DIAGNOSIS — B35.1 ONYCHOMYCOSIS: ICD-10-CM

## 2025-05-23 DIAGNOSIS — M48.061 SPINAL STENOSIS OF LUMBAR REGION, UNSPECIFIED WHETHER NEUROGENIC CLAUDICATION PRESENT: Primary | ICD-10-CM

## 2025-05-23 DIAGNOSIS — I73.9 PERIPHERAL VASCULAR DISEASE (HCC): ICD-10-CM

## 2025-05-23 DIAGNOSIS — M79.674 PAIN IN TOES OF BOTH FEET: ICD-10-CM

## 2025-05-23 DIAGNOSIS — M47.816 LUMBAR SPONDYLOSIS: ICD-10-CM

## 2025-05-23 DIAGNOSIS — M79.675 PAIN IN TOES OF BOTH FEET: ICD-10-CM

## 2025-05-23 PROCEDURE — 99214 OFFICE O/P EST MOD 30 MIN: CPT | Performed by: ANESTHESIOLOGY

## 2025-05-23 PROCEDURE — G2211 COMPLEX E/M VISIT ADD ON: HCPCS | Performed by: ANESTHESIOLOGY

## 2025-05-23 NOTE — PROGRESS NOTES
Name: Jovani Stratton      : 1955      MRN: [id number]  Encounter Provider: Gerald Mendez DPM  Encounter Date: 2025   Encounter department: [facility] [place]  :  Assessment & Plan  Onychomycosis       Debride mycotic nails and thin the nail plates x 10 with the use of a nail nipper manually and an electric Dremel bur was used to reduce the thickness of the nail beds and smoothed the distal aspect of the nails.   Skin tear of lower leg without complication, right, initial encounter       Applied bacitracin ointment on a bordered gauze 2 x 2 to cover the entire lesion.  Instructed the patient to change the dressing every other day unless it gets soiled.  Patient was dispensed a few large Band-Aids to help cover the area.  Type II diabetes mellitus with complication (HCC)    Lab Results   Component Value Date    HGBA1C 7.2 (A) 2025            Peripheral vascular disease (HCC)         Xerosis of skin       Pt was instructed to use lotion once a day on both feet such as cerave, Cetaphil or similar thick style of lotion.   Pain in toes of both feet         Discussed proper shoe gear, daily inspections of feet, and general foot health with patient. Patient has Q8  findings and is recommended for at risk foot care every 9-10 weeks.    Patients most recent complete clinical foot exam was on: 2025    Return in about 3 weeks (around 2025).  For the wound on his right leg  Return in about 10 weeks for diabetic footcare    History of Present Illness   HPI  Jovani Stratton is a 69 y.o. male who presents with chief complaint of painful thick elongated nails on both feet and a skin tear present on the side of his right leg.  Patient states that occurred the other night and he was only able to cover it with paper towel no other dressing.  He is a diabetic whose last A1c was 7.2 performed on 2025.  Patient presents for at-risk foot care.  Patient has no acute concerns today.   "Patient has significant lower extremity risk due to neuropathy, parasthesia, edema, and trophic skin changes to the lower extremity.   History obtained from: patient    Review of Systems  Medical History Reviewed by provider this encounter:     .  Medications Ordered Prior to Encounter[1]   Social History[2]     Objective   Ht 5' 6\" (1.676 m) Comment: verbal  Wt 68 kg (150 lb)   BMI 24.21 kg/m²      Physical Exam  Vascular status is a faint 1/4 DP PT, negative digital hair, normal distal cooling, delayed capillary refill bilaterally.  The capillary refill is approximately 3 to 4 seconds.    Derm nails are brittle elongated hypertrophic yellow-brown discoloration with subungual debris x 10.  There is an increased thickness in the nails of approximately 2 to 5 mm with the hallux nails being the worst.  There is excessive length to the nails of approximately 5 to 7 mm.  There is white flaky tissue present over the entire lower extremity from about mid tibial distal to the toes.  There is large squares of flaky tissue present that easily removed with light pressure.  No signs of any infection, dried blood, or fissures are noted.  There is a superficial ulcer secondary to a skin tear present on the lateral aspect of the right leg.  The ulcer measures approximately 2 cm x 4 cm at its widest part.  It is superficial just into the first layer of epidermis and there is a good granular base seen.  No signs of any infection in the area.           [1]   Current Outpatient Medications on File Prior to Visit   Medication Sig Dispense Refill    atorvastatin (LIPITOR) 40 mg tablet TAKE 1 TABLET (40 MG TOTAL) BY MOUTH IN THE MORNING 90 tablet 2    azelastine (OPTIVAR) 0.05 % ophthalmic solution  (Patient not taking: Reported on 5/23/2025)      clotrimazole-betamethasone (LOTRISONE) 1-0.05 % lotion Apply topically 2 (two) times a day (Patient not taking: Reported on 5/23/2025) 30 mL 0    glipiZIDE (GLUCOTROL XL) 5 mg 24 hr tablet " TAKE 1 TABLET (5 MG TOTAL) BY MOUTH IN THE MORNING 90 tablet 2    Jardiance 10 MG TABS tablet TAKE 1 TABLET (10 MG TOTAL) BY MOUTH EVERY MORNING. 90 tablet 3    lisinopril (ZESTRIL) 5 mg tablet TAKE 1 TABLET (5 MG TOTAL) BY MOUTH DAILY. 90 tablet 0    OneTouch Verio test strip CHECK FASTING BLOOD SUGAR EVERY OTHER DAY IN THE MORNING BEFORE BREAKFAST. 25 strip 11     No current facility-administered medications on file prior to visit.   [2]   Social History  Tobacco Use    Smoking status: Never    Smokeless tobacco: Never   Vaping Use    Vaping status: Never Used   Substance and Sexual Activity    Alcohol use: Not Currently     Alcohol/week: 3.0 standard drinks of alcohol     Types: 3 Glasses of wine per week     Comment: very little wine in winter, 1/8 of a glass? more recently    Drug use: No    Sexual activity: Never

## 2025-05-23 NOTE — PROGRESS NOTES
Name: Jovani Stratton      : 1955      MRN: [id number]  Encounter Provider: Roderick Toscano DO  Encounter Date: 2025   Encounter department: [facility] [place]  :  Assessment & Plan  Lumbar spondylosis         Spinal stenosis of lumbar region, unspecified whether neurogenic claudication present    Orders:    FL spine and pain procedure; Future    69-year-old male with a history of chronic low back pain, lumbar spondylosis, and lumbar stenosis returning for interval follow-up.    The patient is status post bilateral L4-5 and L5-S1 facet joint RFA which was completed 2025.  Unfortunately the patient did not experience much relief with this procedure.  He denies any radicular symptoms into his lower extremities. The patient takes Tylenol and ibuprofen as needed with slight relief.  No sustained improvement with PT.  Patient's low back pain may be secondary to stenosis since no relief with RFA.    1.  Will schedule the patient for bilateral L4 TFESI  2.  Patient will continue with HEP  3.  Patient may take Tylenol 500 to 1000 mg every 8 hours as needed  4.  Patient may take ibuprofen 600 mg every 8 hours as needed  5.  I will follow-up with the patient 2 weeks after injection      Complete risks and benefits including bleeding, infection, tissue reaction, nerve injury and allergic reaction were discussed. The approach was demonstrated using models and literature was provided. Verbal and written consent was obtained.    My impressions and treatment recommendations were discussed in detail with the patient who verbalized understanding and had no further questions.  Discharge instructions were provided. I personally saw and examined the patient and I agree with the above discussed plan of care.    History of Present Illness     Jovani Stratton is a 69 y.o. male with a history of chronic low back pain, lumbar spondylosis, and lumbar stenosis returning for interval follow-up.  Patient  continues to complain of persistent lumbosacral back pain without radicular symptoms into his lower extremities.  The patient is status post bilateral L4-5 and L5-S1 facet joint RFA which was completed March 12, 2025.  Unfortunately the patient did not experience much relief with this procedure.  The patient takes Tylenol and ibuprofen as needed with slight relief.  No sustained improvement with PT.  Patient rates his pain moderate to severe and constant.  Pain is increased with standing, walking, bending, and exercise.  Pain is alleviated with sitting, relaxation, and lying down.    Other than as stated above, the patient denies any interval changes in medications, medical condition, mental condition, symptoms, or allergies since the last office visit.    Review of Systems   Musculoskeletal:  Positive for back pain and gait problem.   All other systems reviewed and are negative.    Medical History Reviewed by provider this encounter:  Tobacco  Allergies  Meds  Problems  Med Hx  Surg Hx  Fam Hx     .  Pertinent Medical History   Lumbar spondylosis, chronic low back pain, and lumbar stenosis      Medical History Reviewed by provider this encounter:  Tobacco  Allergies  Meds  Problems  Med Hx  Surg Hx  Fam Hx     .  Past Medical History   Past Medical History[1]  Past Surgical History[2]  Family History[3]   reports that he has never smoked. He has never used smokeless tobacco. He reports that he does not currently use alcohol after a past usage of about 3.0 standard drinks of alcohol per week. He reports that he does not use drugs.  Current Outpatient Medications   Medication Instructions    atorvastatin (LIPITOR) 40 mg, Oral, Daily    azelastine (OPTIVAR) 0.05 % ophthalmic solution No dose, route, or frequency recorded.    clotrimazole-betamethasone (LOTRISONE) 1-0.05 % lotion Topical, 2 times daily    glipiZIDE (GLUCOTROL XL) 5 mg, Oral, Daily    Jardiance 10 mg, Oral, Every morning    lisinopril  "(ZESTRIL) 5 mg, Oral, Daily    OneTouch Verio test strip CHECK FASTING BLOOD SUGAR EVERY OTHER DAY IN THE MORNING BEFORE BREAKFAST.   Allergies[4]   Medications Ordered Prior to Encounter[5]   Social History[6]     Objective   Ht 5' 6\" (1.676 m)   Wt 68 kg (150 lb)   BMI 24.21 kg/m²         Physical Exam  Constitutional:normal, well developed, well nourished, alert, in no distress and non-toxic and no overt pain behavior.  Eyes:anicteric  HEENT:grossly intact  Neck:supple, symmetric, trachea midline and no masses   Pulmonary:even and unlabored  Cardiovascular:No edema or pitting edema present  Skin:Normal without rashes or lesions and well hydrated  Psychiatric:Mood and affect appropriate  Neurologic:Cranial Nerves II-XII grossly intact  Musculoskeletal: antalgic gait.  Bilateral lumbar paraspinals tender to palpation from L3-L5.  Bilateral lower extremity strength 5 out of 5 in all muscle groups.  Sensation intact to light touch in L3-S1 dermatomes bilaterally.  Negative seated straight leg raise bilaterally.        Radiology Results Review: I personally reviewed the following image studies in PACS and associated radiology reports: MRI spine. My interpretation of the radiology images/reports is: Multilevel spondylosis with varying degrees of central and foraminal stenosis most severe at L3-4 and L4-5..  Severe central stenosis at both levels.  Severe left foraminal stenosis at L4-5 and L5-S1.         [1]   Past Medical History:  Diagnosis Date    Arthritis     Candidal intertrigo 10/16/2015    Diabetes mellitus (HCC)     Difficulty walking     Disease of thyroid gland 10/16/2015    GERD (gastroesophageal reflux disease) ?    not sure what it is    Kidney stone ?    never recorded the date    Thyroid nodule 10/16/2015   [2]   Past Surgical History:  Procedure Laterality Date    EYE SURGERY  ?    eye brow surgery    HERNIA REPAIR      TRIGGER POINT INJECTION     [3]   Family History  Problem Relation Name Age of " Onset    Arthritis Mother Marzena Stratton     Diabetes Mother Marzena Stratton         now type I    Dementia Mother Marzena Stratton     Hearing loss Mother Marzena Stratton     Rheum arthritis Mother Marzena Stratton     Stroke Father Jovani Stratton     ADD / ADHD Father Jovani Stratton         stroke    Cancer Maternal Aunt Andrew Bain     GI problems Maternal Uncle Tyson Mauro     Cancer Maternal Aunt Andrew Bain     Completed Suicide  Maternal Uncle Tyson Mauro     Cancer Maternal Aunt Andrew Bain    [4]   Allergies  Allergen Reactions    Acetaminophen Diarrhea, Nausea Only, Dizziness and Lightheadedness     Liquid only    [5]   Current Outpatient Medications on File Prior to Visit   Medication Sig Dispense Refill    atorvastatin (LIPITOR) 40 mg tablet TAKE 1 TABLET (40 MG TOTAL) BY MOUTH IN THE MORNING 90 tablet 2    glipiZIDE (GLUCOTROL XL) 5 mg 24 hr tablet TAKE 1 TABLET (5 MG TOTAL) BY MOUTH IN THE MORNING 90 tablet 2    Jardiance 10 MG TABS tablet TAKE 1 TABLET (10 MG TOTAL) BY MOUTH EVERY MORNING. 90 tablet 3    lisinopril (ZESTRIL) 5 mg tablet TAKE 1 TABLET (5 MG TOTAL) BY MOUTH DAILY. 90 tablet 0    OneTouch Verio test strip CHECK FASTING BLOOD SUGAR EVERY OTHER DAY IN THE MORNING BEFORE BREAKFAST. 25 strip 11    azelastine (OPTIVAR) 0.05 % ophthalmic solution  (Patient not taking: Reported on 5/23/2025)      clotrimazole-betamethasone (LOTRISONE) 1-0.05 % lotion Apply topically 2 (two) times a day (Patient not taking: Reported on 5/23/2025) 30 mL 0     No current facility-administered medications on file prior to visit.   [6]   Social History  Tobacco Use    Smoking status: Never    Smokeless tobacco: Never   Vaping Use    Vaping status: Never Used   Substance and Sexual Activity    Alcohol use: Not Currently     Alcohol/week: 3.0 standard drinks of alcohol     Types: 3 Glasses of wine per week     Comment: very little wine in winter, 1/8 of a glass? more recently    Drug use: No    Sexual  activity: Never

## 2025-05-28 ENCOUNTER — TELEPHONE (OUTPATIENT)
Dept: RADIOLOGY | Facility: CLINIC | Age: 70
End: 2025-05-28

## 2025-05-28 NOTE — TELEPHONE ENCOUNTER
Lmom for pt to cb to schedule procedure  Unable to leave a message on home number  It picks up and disconnects right away.

## 2025-06-04 ENCOUNTER — OFFICE VISIT (OUTPATIENT)
Dept: PODIATRY | Facility: CLINIC | Age: 70
End: 2025-06-04
Payer: COMMERCIAL

## 2025-06-04 VITALS — BODY MASS INDEX: 23.63 KG/M2 | HEIGHT: 66 IN | WEIGHT: 147 LBS

## 2025-06-04 DIAGNOSIS — I73.9 PERIPHERAL VASCULAR DISEASE (HCC): ICD-10-CM

## 2025-06-04 DIAGNOSIS — S81.811D SKIN TEAR OF LOWER LEG WITHOUT COMPLICATION, RIGHT, SUBSEQUENT ENCOUNTER: Primary | ICD-10-CM

## 2025-06-04 DIAGNOSIS — E11.8 TYPE II DIABETES MELLITUS WITH COMPLICATION (HCC): ICD-10-CM

## 2025-06-04 PROCEDURE — 99212 OFFICE O/P EST SF 10 MIN: CPT | Performed by: PODIATRIST

## 2025-06-04 NOTE — PROGRESS NOTES
"Name: Jovani Stratton      : 1955      MRN: [id number]  Encounter Provider: Gerald Mendez DPM  Encounter Date: 2025   Encounter department: [facility] [place]  :  Assessment & Plan  Skin tear of lower leg without complication, right, subsequent encounter       Patient is to continue applying the bordered gauze through the weekend changing it every other day.  Urged the patient to apply lotion daily to both feet and legs to help provide some moisture and elasticity to the area.  Peripheral vascular disease (HCC)         Type II diabetes mellitus with complication (HCC)    Lab Results   Component Value Date    HGBA1C 7.2 (A) 2025            Return if symptoms worsen or fail to improve.     History of Present Illness   HPI  Jovani Stratton is a 69 y.o. male who presents for follow-up of a skin tear present on the outside part of his right leg.  He is a diabetic whose sugar has not changed.  Patient has been putting dry bordered 4 x 4 on the lesion with no problem.  Patient denies the time to apply lotion to both legs.  History obtained from: patient    Review of Systems  Medical History Reviewed by provider this encounter:     .  Medications Ordered Prior to Encounter[1]      Objective   Ht 5' 6\" (1.676 m) Comment: verbal  Wt 66.7 kg (147 lb)   BMI 23.73 kg/m²      Physical Exam  The superficial skin tear that was present on the outside part of the right leg is completely healed.  There is an area of erythema where the wound was and is a very small opening is still present on the proximal margin.  The opening measures approximately 1 mm x 1 mm with no signs of any infection.  No pain with palpation and no odor is present.  The skin on the legs and feet are dry with loss of turgor and thin to touch.    Vascular is unchanged from his last visit.         [1]   Current Outpatient Medications on File Prior to Visit   Medication Sig Dispense Refill    atorvastatin (LIPITOR) 40 mg tablet " TAKE 1 TABLET (40 MG TOTAL) BY MOUTH IN THE MORNING 90 tablet 2    glipiZIDE (GLUCOTROL XL) 5 mg 24 hr tablet TAKE 1 TABLET (5 MG TOTAL) BY MOUTH IN THE MORNING 90 tablet 2    Jardiance 10 MG TABS tablet TAKE 1 TABLET (10 MG TOTAL) BY MOUTH EVERY MORNING. 90 tablet 3    lisinopril (ZESTRIL) 5 mg tablet TAKE 1 TABLET (5 MG TOTAL) BY MOUTH DAILY. 90 tablet 0    OneTouch Verio test strip CHECK FASTING BLOOD SUGAR EVERY OTHER DAY IN THE MORNING BEFORE BREAKFAST. 25 strip 11    azelastine (OPTIVAR) 0.05 % ophthalmic solution  (Patient not taking: Reported on 6/2/2025)      clotrimazole-betamethasone (LOTRISONE) 1-0.05 % lotion Apply topically 2 (two) times a day (Patient not taking: Reported on 6/2/2025) 30 mL 0     No current facility-administered medications on file prior to visit.

## 2025-06-05 ENCOUNTER — APPOINTMENT (OUTPATIENT)
Dept: LAB | Facility: CLINIC | Age: 70
End: 2025-06-05
Payer: COMMERCIAL

## 2025-06-05 DIAGNOSIS — E11.29 TYPE 2 DIABETES MELLITUS WITH MICROALBUMINURIA, WITHOUT LONG-TERM CURRENT USE OF INSULIN (HCC): ICD-10-CM

## 2025-06-05 DIAGNOSIS — R80.9 TYPE 2 DIABETES MELLITUS WITH MICROALBUMINURIA, WITHOUT LONG-TERM CURRENT USE OF INSULIN (HCC): ICD-10-CM

## 2025-06-05 LAB
CREAT SERPL-MCNC: 1.07 MG/DL (ref 0.6–1.3)
CREAT UR-MCNC: 42.5 MG/DL
GFR SERPL CREATININE-BSD FRML MDRD: 70 ML/MIN/1.73SQ M
MICROALBUMIN UR-MCNC: <7 MG/L

## 2025-06-05 PROCEDURE — 82043 UR ALBUMIN QUANTITATIVE: CPT

## 2025-06-05 PROCEDURE — 36415 COLL VENOUS BLD VENIPUNCTURE: CPT

## 2025-06-05 PROCEDURE — 82565 ASSAY OF CREATININE: CPT

## 2025-06-05 PROCEDURE — 82570 ASSAY OF URINE CREATININE: CPT

## 2025-07-10 ENCOUNTER — TELEPHONE (OUTPATIENT)
Dept: PAIN MEDICINE | Facility: CLINIC | Age: 70
End: 2025-07-10

## 2025-07-10 DIAGNOSIS — M54.16 LUMBAR RADICULOPATHY: Primary | ICD-10-CM

## 2025-07-10 NOTE — TELEPHONE ENCOUNTER
----- Message from Adrianna Zheng PA-C sent at 7/10/2025  2:02 PM EDT -----  Can you please call patient and ask why he is on my schedule tomorrow?  His last OV with Dr. Toscano stated to schedule TFESI and follow up after the injection, but the injection was never scheduled.  We don't prescribe meds to him so I'm just trying to figure out the reason for the visit.  Thanks!!

## 2025-07-10 NOTE — TELEPHONE ENCOUNTER
When I spoke with patient last to schedule his procedure he stated his family member that brings him was out of town. He would call me to schedule when they returned as he did not know their scheduled to drive him.

## 2025-07-10 NOTE — TELEPHONE ENCOUNTER
S/w the patient to review the previous message. He stated he was told that he was scheduled for the TFESI for tomorrow. Reviewed with him and apologized for the mistake. Explained that he was supposed to be scheduled for the epidural. I cancelled his ovs. Please schedule GURMEET. Thank you.  House phone disconnects. Call cell phone please and thank  you

## 2025-07-26 DIAGNOSIS — R80.9 TYPE 2 DIABETES MELLITUS WITH MICROALBUMINURIA, WITHOUT LONG-TERM CURRENT USE OF INSULIN (HCC): ICD-10-CM

## 2025-07-26 DIAGNOSIS — E11.29 TYPE 2 DIABETES MELLITUS WITH MICROALBUMINURIA, WITHOUT LONG-TERM CURRENT USE OF INSULIN (HCC): ICD-10-CM

## 2025-07-28 RX ORDER — LISINOPRIL 5 MG/1
5 TABLET ORAL DAILY
Qty: 90 TABLET | Refills: 0 | Status: SHIPPED | OUTPATIENT
Start: 2025-07-28

## 2025-08-04 ENCOUNTER — HOSPITAL ENCOUNTER (OUTPATIENT)
Dept: RADIOLOGY | Facility: CLINIC | Age: 70
Discharge: HOME | End: 2025-08-04
Attending: ANESTHESIOLOGY
Payer: COMMERCIAL

## 2025-08-04 VITALS
HEART RATE: 77 BPM | SYSTOLIC BLOOD PRESSURE: 111 MMHG | RESPIRATION RATE: 20 BRPM | DIASTOLIC BLOOD PRESSURE: 70 MMHG | OXYGEN SATURATION: 98 % | TEMPERATURE: 97.7 F

## 2025-08-04 DIAGNOSIS — M54.16 LUMBAR RADICULOPATHY: ICD-10-CM

## 2025-08-04 PROCEDURE — 64483 NJX AA&/STRD TFRM EPI L/S 1: CPT | Performed by: ANESTHESIOLOGY

## 2025-08-04 RX ORDER — PAPAVERINE HCL 150 MG
15 CAPSULE, EXTENDED RELEASE ORAL ONCE
Status: COMPLETED | OUTPATIENT
Start: 2025-08-04 | End: 2025-08-04

## 2025-08-04 RX ADMIN — DEXAMETHASONE SODIUM PHOSPHATE 15 MG: 10 INJECTION, SOLUTION INTRAMUSCULAR; INTRAVENOUS at 15:04

## 2025-08-04 RX ADMIN — IOHEXOL 2 ML: 300 INJECTION, SOLUTION INTRAVENOUS at 15:03

## 2025-08-04 RX ADMIN — LIDOCAINE HYDROCHLORIDE 2 ML: 20 INJECTION, SOLUTION EPIDURAL; INFILTRATION; INTRACAUDAL; PERINEURAL at 15:04

## 2025-08-12 ENCOUNTER — TELEPHONE (OUTPATIENT)
Dept: FAMILY MEDICINE CLINIC | Facility: CLINIC | Age: 70
End: 2025-08-12

## 2025-08-18 ENCOUNTER — TELEPHONE (OUTPATIENT)
Dept: PAIN MEDICINE | Facility: CLINIC | Age: 70
End: 2025-08-18

## 2025-08-21 ENCOUNTER — TELEPHONE (OUTPATIENT)
Dept: FAMILY MEDICINE CLINIC | Facility: CLINIC | Age: 70
End: 2025-08-21

## 2025-08-21 ENCOUNTER — OFFICE VISIT (OUTPATIENT)
Dept: FAMILY MEDICINE CLINIC | Facility: CLINIC | Age: 70
End: 2025-08-21

## 2025-08-21 VITALS
HEIGHT: 66 IN | RESPIRATION RATE: 20 BRPM | WEIGHT: 145 LBS | TEMPERATURE: 98 F | HEART RATE: 80 BPM | OXYGEN SATURATION: 99 % | SYSTOLIC BLOOD PRESSURE: 131 MMHG | DIASTOLIC BLOOD PRESSURE: 78 MMHG | BODY MASS INDEX: 23.3 KG/M2

## 2025-08-21 DIAGNOSIS — E11.29 TYPE 2 DIABETES MELLITUS WITH MICROALBUMINURIA, WITHOUT LONG-TERM CURRENT USE OF INSULIN (HCC): Primary | ICD-10-CM

## 2025-08-21 DIAGNOSIS — R80.9 TYPE 2 DIABETES MELLITUS WITH MICROALBUMINURIA, WITHOUT LONG-TERM CURRENT USE OF INSULIN (HCC): Primary | ICD-10-CM

## 2025-08-21 LAB — SL AMB POCT HEMOGLOBIN AIC: 7 (ref ?–6.5)

## 2025-08-21 PROCEDURE — G2211 COMPLEX E/M VISIT ADD ON: HCPCS

## 2025-08-21 PROCEDURE — 99214 OFFICE O/P EST MOD 30 MIN: CPT

## 2025-08-21 PROCEDURE — 83036 HEMOGLOBIN GLYCOSYLATED A1C: CPT

## 2025-08-21 RX ORDER — LANCETS
1 EACH MISCELLANEOUS
COMMUNITY

## 2025-08-22 ENCOUNTER — PROCEDURE VISIT (OUTPATIENT)
Dept: PODIATRY | Facility: CLINIC | Age: 70
End: 2025-08-22
Payer: COMMERCIAL

## 2025-08-22 DIAGNOSIS — B35.1 ONYCHOMYCOSIS: Primary | ICD-10-CM

## 2025-08-22 DIAGNOSIS — M79.675 PAIN IN TOES OF BOTH FEET: ICD-10-CM

## 2025-08-22 DIAGNOSIS — M79.674 PAIN IN TOES OF BOTH FEET: ICD-10-CM

## 2025-08-22 DIAGNOSIS — E11.8 TYPE II DIABETES MELLITUS WITH COMPLICATION (HCC): ICD-10-CM

## 2025-08-22 DIAGNOSIS — I73.9 PERIPHERAL VASCULAR DISEASE (HCC): ICD-10-CM

## 2025-08-22 PROCEDURE — 11721 DEBRIDE NAIL 6 OR MORE: CPT | Performed by: PODIATRIST
